# Patient Record
Sex: FEMALE | Race: WHITE | NOT HISPANIC OR LATINO | Employment: UNEMPLOYED | URBAN - METROPOLITAN AREA
[De-identification: names, ages, dates, MRNs, and addresses within clinical notes are randomized per-mention and may not be internally consistent; named-entity substitution may affect disease eponyms.]

---

## 2017-01-17 ENCOUNTER — OFFICE VISIT (OUTPATIENT)
Dept: NEUROLOGY | Facility: CLINIC | Age: 52
End: 2017-01-17
Payer: COMMERCIAL

## 2017-01-17 VITALS
DIASTOLIC BLOOD PRESSURE: 70 MMHG | HEART RATE: 82 BPM | HEIGHT: 66 IN | BODY MASS INDEX: 26.47 KG/M2 | SYSTOLIC BLOOD PRESSURE: 100 MMHG | WEIGHT: 164.69 LBS | RESPIRATION RATE: 16 BRPM

## 2017-01-17 DIAGNOSIS — F32.A ANXIETY AND DEPRESSION: ICD-10-CM

## 2017-01-17 DIAGNOSIS — F41.9 ANXIETY AND DEPRESSION: ICD-10-CM

## 2017-01-17 DIAGNOSIS — M79.7 FIBROMYALGIA: Primary | ICD-10-CM

## 2017-01-17 PROCEDURE — 99999 PR PBB SHADOW E&M-EST. PATIENT-LVL III: CPT | Mod: PBBFAC,,, | Performed by: PSYCHIATRY & NEUROLOGY

## 2017-01-17 PROCEDURE — 1159F MED LIST DOCD IN RCRD: CPT | Mod: S$GLB,,, | Performed by: PSYCHIATRY & NEUROLOGY

## 2017-01-17 PROCEDURE — 99214 OFFICE O/P EST MOD 30 MIN: CPT | Mod: S$GLB,,, | Performed by: PSYCHIATRY & NEUROLOGY

## 2017-01-17 RX ORDER — BUPROPION HYDROCHLORIDE 100 MG/1
100 TABLET ORAL EVERY MORNING
COMMUNITY
End: 2017-08-16

## 2017-01-17 RX ORDER — NORTRIPTYLINE HYDROCHLORIDE 10 MG/1
10 CAPSULE ORAL NIGHTLY
Qty: 30 CAPSULE | Refills: 11 | Status: SHIPPED | OUTPATIENT
Start: 2017-01-17 | End: 2017-05-23 | Stop reason: SDUPTHER

## 2017-01-17 NOTE — MR AVS SNAPSHOT
[facility]  [address] [address]  Phone: [phone]  Fax: [phone]                  Aleah Hagan   2017 11:30 AM   Office Visit    Description:  Female : 1965   Provider:  Anthony Maciel MD   Department:  [facility]           Reason for Visit     Neurologic Problem           Diagnoses this Visit        Comments    Fibromyalgia    -  Primary     Anxiety and depression                To Do List           Goals (5 Years of Data)     None      Follow-Up and Disposition     Return in about 4 months (around 2017).       These Medications        Disp Refills Start End    nortriptyline (PAMELOR) 10 MG capsule 30 capsule 11 2017    Take 1 capsule (10 mg total) by mouth every evening. - Oral    Pharmacy: [facility] [address] [place] [address] #: [phone]         [facility] On Call     [facility] On Call Nurse [facility] Line -  Assistance  Registered nurses in the [facility] provide clinical advisement, health education, appointment booking, and other advisory services.  Call for this free service at [phone].             Medications           Message regarding Medications     Verify the changes and/or additions to your medication regime listed below are the same as discussed with your clinician today.  If any of these changes or additions are incorrect, please notify your healthcare provider.        START taking these NEW medications        Refills    nortriptyline (PAMELOR) 10 MG capsule 11    Sig: Take 1 capsule (10 mg total) by mouth every evening.    Class: Normal    Route: Oral      STOP taking these medications     QUETIAPINE FUMARATE (SEROQUEL ORAL) Take 1 tablet by mouth once daily.           Verify that the below list of medications is an accurate representation of the medications you are currently taking.  If none reported, the list may be blank. If incorrect, please  "contact your healthcare provider. Carry this list with you in case of emergency.           Current Medications     buPROPion (WELLBUTRIN) 100 MG tablet Take 100 mg by mouth every morning.    clonazePAM (KLONOPIN) 0.5 MG tablet Take 0.5 mg by mouth 2 (two) times daily as needed for Anxiety.    DOCOSAHEXANOIC ACID/EPA (FISH OIL ORAL) Take 1 tablet by mouth once daily.    escitalopram oxalate (LEXAPRO) 20 MG tablet Take 20 mg by mouth once daily.    ferrous sulfate 324 mg (65 mg iron) TbEC Take 325 mg by mouth once daily.    multivitamin (ONE DAILY MULTIVITAMIN) per tablet Take 1 tablet by mouth once daily.    omeprazole (PRILOSEC) 40 MG capsule Take 40 mg by mouth once daily.    nortriptyline (PAMELOR) 10 MG capsule Take 1 capsule (10 mg total) by mouth every evening.           Clinical Reference Information           Vital Signs - Last Recorded  Most recent update: 1/17/2017 11:37 AM by Ana Pruitt MA    BP Pulse Resp Ht Wt BMI    100/70 (BP Location: Left arm, Patient Position: Sitting, BP Method: Manual) 82 16 5' 6" (1.676 m) 74.7 kg (164 lb 10.9 oz) 26.58 kg/m2      Blood Pressure          Most Recent Value    BP  100/70      Allergies as of 1/17/2017     Codeine      Immunizations Administered on Date of Encounter - 1/17/2017     None      Smoking Cessation     If you would like to quit smoking:   You may be eligible for free services if you are a [place] resident and started smoking cigarettes before September 1, 1988.  Call the [employer]ess[employer] ([employer]) toll free at [phone] [phone] or [phone].   Call 6-460-QUIT-NOW if you do not meet the above criteria.            "

## 2017-01-17 NOTE — PROGRESS NOTES
"HPI:  Aleah Hagan is a 51 y.o. female with  Fibromyalgia and mild  Lumbar facet arthropathy with multiple areas of pain. Then s/p bariatric surgery with vision tracing problems (resolved)/ symptoms of light headedness with eye movement-resolved.     Although it is not on med list today, she states she is taking Lyrica 50mg BID  She states she continues to have flares of fibromyalgia pain which are severe  She last saw Francy Garvey 3 months ago and has not followed back up for mental health. Her mood is annoyed and "tired"   She has considering doing some TPI again with Dr Reynolds  She has poor sleep    Review of Systems   Constitutional: Negative for fever.   HENT: Negative for nosebleeds.    Eyes: Negative for double vision.   Respiratory: Negative for hemoptysis.    Cardiovascular: Negative for leg swelling.   Gastrointestinal: Negative for blood in stool.   Genitourinary: Negative for hematuria.   Musculoskeletal: Positive for myalgias.   Skin: Negative for rash.   Neurological: Negative for seizures.   Psychiatric/Behavioral: Positive for depression. Negative for suicidal ideas. The patient has insomnia.        Exam:  Gen Appearance, well developed/nourished in no apparent distress  CV: 2+ distal pulses with no edema or swelling  Neuro:  MS: Awake, alert, Sustains attention..  Recent/remote memory intact, Language is full to spontaneous speech/omprehension. Fund of Knowledge is full.   CN: Optic discs are flat with normal vasculature, PERRL, Extraoccular movements and visual fields are full. Normal facial sensation and strength, Hearing symmetric, Tongue and Palate are midline and strong. Shoulder Shrug symmetric and strong.  Motor: Normal bulk, tone, no abnormal movements. 5/5 strength bilateral upper/lower extremities with 2+ reflexes  Sensory: symmetric to  Temp,  and vibration Romberg negative  Cerebellar: Heal to shin, Finger to nose, Rapid alternating movements intact  Gait: Normal stance, no " ataxia  Multiple tender points.           Assessment/Plan: Aleah Hagan is a 51 y.o. female with  Fibromyalgia and mild  Lumbar facet arthropathy with multiple areas of pain. Then s/p bariatric surgery with vision tracing problems (resolved)/ symptoms of light headedness with eye movement-resolved.   I recommend:     1.  Patient's mood was improved with treatments with Francy Dixie but patient did not maintain follow up. The importance of good mood control to further control fibromyalgia symptoms were reviewed. See this provider back and consider additional counseling for mood urged.   2.  Lyrica to continue at 50mg BID (can't tolerate higher dose)  3  Neuropathic cream given for fibro pain not helpful. Elavil worsened constipation.  Gabapentin/Cymbalta also tried without relief.  Had some relief with dry needling prior.  Rheumatology previously diagnosed her with fibromyalgia  4. Add low dose Pamelor per orders for fibromyalgia pain Unless sedation, mood changes or other side effects. Cautioned about low risk of serotonin syndrome with use of Lexapro/Welbutrin. Watch for cramps, GI upset, confusion, fever.  5. She is considering TPI with Dr Reynolds again.  If this fails, another opinion from rheumatology could be considered.   6. CAMMIE symptoms resolved after weight loss surgery and MRI brain normal for eye movement complaint/ reassuring prior - may have been a nutritional change post bariatric surgery?-- resolved    RTC in 4 months

## 2017-03-22 ENCOUNTER — TELEPHONE (OUTPATIENT)
Dept: NEUROLOGY | Facility: CLINIC | Age: 52
End: 2017-03-22

## 2017-03-22 NOTE — TELEPHONE ENCOUNTER
----- Message from Mago Sullivan sent at 3/22/2017 10:07 AM CDT -----  Contact: SELF  Aleah Hagan  MRN: [id number]  : 1965  PCP: Binu Burnett  Home Phone      [phone]  Work Phone      Not on file.  Mobile          [phone]      MESSAGE: The patient has jury duty on 3/28/17 and is requesting if Dr. Maciel would write a letter to excuse her from jury duty. The patient is also requesting a refill on clonazePAM (KLONOPIN) 0.5 MG tablet, buPROPion (WELLBUTRIN) 100 MG tablet, omeprazole (PRILOSEC) 40 MG capsule and pregabalin (LYRICA) 50 MG capsule to be sent to [facility] in [place].  Phone:[phone]

## 2017-03-22 NOTE — TELEPHONE ENCOUNTER
Patient asking for excuse letter for jury duty.  Also no medication refills were put in as none of these medications were prescribed by .

## 2017-03-23 NOTE — TELEPHONE ENCOUNTER
Sorry. The patient does not have a diagnosis via me that would allow her to be excused from Jury duty. However, she can speak to her mental health provider about this if she feels her mood disorder would prevent her from servicing.

## 2017-03-24 RX ORDER — PREGABALIN 50 MG/1
50 CAPSULE ORAL 2 TIMES DAILY
Qty: 60 CAPSULE | Refills: 2 | Status: SHIPPED | OUTPATIENT
Start: 2017-03-24 | End: 2017-05-23 | Stop reason: SDUPTHER

## 2017-05-23 ENCOUNTER — OFFICE VISIT (OUTPATIENT)
Dept: NEUROLOGY | Facility: CLINIC | Age: 52
End: 2017-05-23
Payer: COMMERCIAL

## 2017-05-23 VITALS
HEIGHT: 67 IN | RESPIRATION RATE: 18 BRPM | DIASTOLIC BLOOD PRESSURE: 72 MMHG | SYSTOLIC BLOOD PRESSURE: 120 MMHG | WEIGHT: 161.38 LBS | HEART RATE: 82 BPM | BODY MASS INDEX: 25.33 KG/M2

## 2017-05-23 DIAGNOSIS — F41.9 ANXIETY: ICD-10-CM

## 2017-05-23 DIAGNOSIS — M79.7 FIBROMYALGIA: Primary | ICD-10-CM

## 2017-05-23 DIAGNOSIS — J06.9 VIRAL URI: ICD-10-CM

## 2017-05-23 PROCEDURE — 99999 PR PBB SHADOW E&M-EST. PATIENT-LVL III: CPT | Mod: PBBFAC,,, | Performed by: PSYCHIATRY & NEUROLOGY

## 2017-05-23 PROCEDURE — 1160F RVW MEDS BY RX/DR IN RCRD: CPT | Mod: S$GLB,,, | Performed by: PSYCHIATRY & NEUROLOGY

## 2017-05-23 PROCEDURE — 99214 OFFICE O/P EST MOD 30 MIN: CPT | Mod: S$GLB,,, | Performed by: PSYCHIATRY & NEUROLOGY

## 2017-05-23 RX ORDER — PREGABALIN 50 MG/1
50 CAPSULE ORAL 2 TIMES DAILY
Qty: 60 CAPSULE | Refills: 11 | Status: SHIPPED | OUTPATIENT
Start: 2017-05-23 | End: 2017-05-23 | Stop reason: SDUPTHER

## 2017-05-23 RX ORDER — PREGABALIN 50 MG/1
50 CAPSULE ORAL 2 TIMES DAILY
Qty: 60 CAPSULE | Refills: 5 | Status: SHIPPED | OUTPATIENT
Start: 2017-05-23 | End: 2017-11-13

## 2017-05-23 RX ORDER — NORTRIPTYLINE HYDROCHLORIDE 10 MG/1
10 CAPSULE ORAL NIGHTLY
Qty: 30 CAPSULE | Refills: 11 | Status: SHIPPED | OUTPATIENT
Start: 2017-05-23 | End: 2017-06-20

## 2017-05-23 RX ORDER — CYCLOBENZAPRINE HCL 10 MG
10 TABLET ORAL DAILY PRN
Qty: 30 TABLET | Refills: 11 | Status: SHIPPED | OUTPATIENT
Start: 2017-05-23 | End: 2017-06-02

## 2017-05-23 NOTE — PROGRESS NOTES
HPI:Aleah Hagan is a 51 y.o. female with  Fibromyalgia and mild  Lumbar facet arthropathy with multiple areas of pain. Then s/p bariatric surgery with vision tracing problems (resolved)/ symptoms of light headedness with eye movement-resolved.     Since the last visit, patient tried pamelor but she thinks she ran out and never refilled so she is not sure of the response.   Patient has been using OTC meds for sinus and ear congestion. No fever.   Patient has run out of meds with psychiatry NP as she has not followed up. However, she feels her mood is better.   She has continued myalgia.  Review of Systems   Constitutional: Negative for fever.   HENT: Negative for nosebleeds.    Eyes: Negative for double vision.   Respiratory: Negative for hemoptysis.    Cardiovascular: Negative for leg swelling.   Gastrointestinal: Negative for blood in stool.   Genitourinary: Negative for hematuria.   Musculoskeletal: Positive for myalgias.   Skin: Negative for rash.   Neurological: Negative for tremors.   Psychiatric/Behavioral: Negative for depression and suicidal ideas. The patient is nervous/anxious and has insomnia.         Some anxiety which she states if often mild       Exam:  Gen Appearance, well developed/nourished in no apparent distress  CV: 2+ distal pulses with no edema or swelling  Neuro:  MS: Awake, alert, Sustains attention..  Recent/remote memory intact, Language is full to spontaneous speech/comprehension. Fund of Knowledge is full.   Mood is euthymic  CN: Optic discs are flat with normal vasculature, PERRL, Extraoccular movements and visual fields are full. Normal facial sensation and strength, Hearing symmetric, Tongue and Palate are midline and strong. Shoulder Shrug symmetric and strong.  Motor: Normal bulk, tone, no abnormal movements. 5/5 strength bilateral upper/lower extremities with 2+ reflexes  Sensory: symmetric to  Temp,  and vibration Romberg negative  Cerebellar: Heal to shin, Finger to nose,  Rapid alternating movements intact  Gait: Normal stance, no ataxia  Multiple tender points.   TM clear bilaterally and no lesions in pharynx        Assessment/Plan: Aleah Hagan is a 51 y.o. female with  Fibromyalgia and mild  Lumbar facet arthropathy with multiple areas of pain. Then s/p bariatric surgery with vision tracing problems (resolved)/ symptoms of light headedness with eye movement-resolved.   I recommend:     1.  Patient has seen Francy Garvey for mood- suggested she follow up to maintain on her prior meds, especially if mood worsens again (She states she has tolerated off of Welbutrin and lexapro well with only mild anxiety)  2.  Lyrica to continue at 50mg BID (can't tolerate higher dose)  3  Neuropathic cream given for fibro pain not helpful. Elavil worsened constipation.  Gabapentin/Cymbalta also tried without relief.  Had some relief with dry needling prior.  Rheumatology previously diagnosed her with fibromyalgia. Resume pamelor as suggested prior unless side effects. Patient should try this for 4-6 months to see if this helps.   4. Flexeril per orders for myalgia as well PRN unless sedation  5. She was considering TPI with Dr Reynolds again.  If this fails, another opinion from rheumatology could be considered at any point.   6. CAMMIE symptoms resolved after weight loss surgery and MRI brain normal for eye movement complaint/ reassuring prior - may have been a nutritional change post bariatric surgery?-- resolved  7. See PCP if any fever or worsening with viral type URI symptoms. Otherwise, hydrate, rest and no specific treatment needed.   RTC in 4 months

## 2017-06-20 ENCOUNTER — OFFICE VISIT (OUTPATIENT)
Dept: OBSTETRICS AND GYNECOLOGY | Facility: CLINIC | Age: 52
End: 2017-06-20
Payer: COMMERCIAL

## 2017-06-20 VITALS
HEIGHT: 67 IN | BODY MASS INDEX: 25.4 KG/M2 | SYSTOLIC BLOOD PRESSURE: 120 MMHG | HEART RATE: 68 BPM | RESPIRATION RATE: 12 BRPM | DIASTOLIC BLOOD PRESSURE: 72 MMHG | WEIGHT: 161.81 LBS

## 2017-06-20 DIAGNOSIS — K29.60 REFLUX GASTRITIS: ICD-10-CM

## 2017-06-20 DIAGNOSIS — Z87.410 HISTORY OF CERVICAL DYSPLASIA: ICD-10-CM

## 2017-06-20 DIAGNOSIS — Z01.419 WELL WOMAN EXAM WITH ROUTINE GYNECOLOGICAL EXAM: Primary | ICD-10-CM

## 2017-06-20 DIAGNOSIS — Z12.31 SCREENING MAMMOGRAM, ENCOUNTER FOR: ICD-10-CM

## 2017-06-20 DIAGNOSIS — Z12.4 CERVICAL CANCER SCREENING: ICD-10-CM

## 2017-06-20 PROCEDURE — 99396 PREV VISIT EST AGE 40-64: CPT | Mod: S$GLB,,, | Performed by: OBSTETRICS & GYNECOLOGY

## 2017-06-20 PROCEDURE — 87624 HPV HI-RISK TYP POOLED RSLT: CPT

## 2017-06-20 PROCEDURE — 88141 CYTOPATH C/V INTERPRET: CPT | Mod: ,,, | Performed by: PATHOLOGY

## 2017-06-20 PROCEDURE — 88175 CYTOPATH C/V AUTO FLUID REDO: CPT | Performed by: PATHOLOGY

## 2017-06-20 PROCEDURE — 99999 PR PBB SHADOW E&M-EST. PATIENT-LVL III: CPT | Mod: PBBFAC,,, | Performed by: OBSTETRICS & GYNECOLOGY

## 2017-06-20 RX ORDER — OMEPRAZOLE 40 MG/1
40 CAPSULE, DELAYED RELEASE ORAL DAILY
Qty: 30 CAPSULE | Refills: 0 | Status: SHIPPED | OUTPATIENT
Start: 2017-06-20 | End: 2017-11-13

## 2017-06-20 NOTE — PROGRESS NOTES
Subjective:    Patient ID: Aleah Hagan is a 51 y.o. y.o. female.     Chief Complaint: Annual Well Woman Exam     History of Present Illness:  Aleah WRAY presents today for Annual Well Woman exam. She describes her menses as postmenopausal with no PMB.She denies pelvic pain.  She denies breast tenderness, masses, nipple discharge. She denies difficulty with urination or bowel movements. She denies menopausal symptoms such as hotflashes, vaginal dryness, and night sweats. She denies bloating, early satiety, or weight changes. She is sexually active.      Menstrual History:   Patient's last menstrual period was 2015..     OB History      Para Term  AB Living    2 2 1 1  2    SAB TAB Ectopic Multiple Live Births                  The following portions of the patient's history were reviewed and updated as appropriate: allergies, current medications, past family history, past medical history, past social history, past surgical history and problem list.    ROS:   CONSTITUTIONAL: chills, fatigue, Denies: fever, diaphoresis, fatigue, weight loss, weight gain  ENT: negative for sore throat, nasal congestion, nasal discharge, epistaxis, tinnitus, hearing loss  EYES: decreased vision, Denies: blurry vision, loss of vision, eye pain, diplopia, discharge  SKIN: itching, Denies: rash, hives  RESPIRATORY: negative for cough, hemoptysis, shortness of breath, pleuritic chest pain, wheezing  CARDIOVASCULAR: negative for chest pain, dyspnea on exertion, orthopnea, paroxysmal nocturnal dyspnea, edema, palpitations  BREAST: negative for breast  tenderness, breast mass, nipple discharge, or skin changes  GASTROINTESTINAL: diarrhea, constipation, Denies: abdominal pain, flank pain, nausea, vomiting, black stool, blood in stool  GENITOURINARY: genital discharge, Denies: dysuria, frequency/urgency, hematuria, vaginal bleeding, irregular menses, heavy menses, pelvic pain  HEMATOLOGIC/LYMPHATIC: swollen lymph nodes,  Denies: bleeding, bruising  MUSCULOSKELETAL: back pain, joint pain, joint stiffness, joint swelling, muscle pain, muscle weakness  NEUROLOGICAL: focal weakness, Denies: dizzy/vertigo, headache, numbness/tingling, speech problems, loss of consciousness, confusion  BEHAVORIAL/PSYCH: No psychosis and Positive for history of depression and anxiety  ENDOCRINE: temperature intolerance, Denies: polydipsia/polyuria, palpitations, skin changes, unexpected weight changes  ALLERGIC/IMMUNOLOGIC: negative for urticaria, hay fever, angioedema      Objective:    Vital Signs:  Vitals:    06/20/17 1330   BP: 120/72   Pulse: 68   Resp: 12       Physical Exam:  General:  alert, cooperative, appears stated age, no distress   Skin:  Skin color, texture, turgor normal. No rashes or lesions   HEENT:  extra ocular movement intact, sclera clear, anicteric   Neck: supple, trachea midline, no adenopathy or thyromegally   Respiratory:  Normal effort   Breasts:  no discharge, erythema, or tenderness, bilateral implants were noted without obvious palpable abnormalities   Abdomen:  soft, nontender, no palpable masses   Pelvis: External genitalia: normal general appearance  Urinary system: urethral meatus normal, bladder nontender  Vaginal: normal mucosa without prolapse or lesions  Cervix: normal appearance  Uterus: normal size, shape, position  Adnexa: normal size, nontender bilaterally   Extremities: Normal ROM; no edema, no cyanosis   Neurologial: Normal strength and tone. No focal numbness or weakness   Psychiatric: normal mood, speech, dress, and thought processes         Assessment:       Healthy female exam.     1. Well woman exam with routine gynecological exam    2. Cervical cancer screening    3. History of cervical dysplasia    4. Reflux gastritis    5. Screening mammogram, encounter for          Plan:      1. Well woman exam with routine gynecological exam    2. Cervical cancer screening  - Liquid-based pap smear, diagnostic  - HPV  High Risk Genotypes, PCR    3. History of cervical dysplasia  - Liquid-based pap smear, diagnostic  - HPV High Risk Genotypes, PCR    4. Reflux gastritis  - omeprazole (PRILOSEC) 40 MG capsule; Take 1 capsule (40 mg total) by mouth once daily.  Dispense: 30 capsule; Refill: 0    5. Screening mammogram, encounter for  - Mammo Digital Screening Bilat with CAD; Future      COUNSELING:  Aleah WRAY was counseled on STD pevention, use and side-effects of various contraceptive measures, A.C.O.G. Pap guidelines and recommendations for yearly pelvic exams in addition to recommendations for monthly self breast exams; to see her PCP for other health maintenance.

## 2017-06-26 LAB
HPV HR 12 DNA CVX QL NAA+PROBE: POSITIVE
HPV16 DNA SPEC QL NAA+PROBE: NEGATIVE
HPV18 DNA SPEC QL NAA+PROBE: NEGATIVE

## 2017-06-28 ENCOUNTER — TELEPHONE (OUTPATIENT)
Dept: OBSTETRICS AND GYNECOLOGY | Facility: CLINIC | Age: 52
End: 2017-06-28

## 2017-06-28 NOTE — TELEPHONE ENCOUNTER
----- Message from Karin Gamboa MA sent at 6/26/2017  3:21 PM CDT -----  Pt is returning call for results. [phone]

## 2017-07-07 ENCOUNTER — PATIENT MESSAGE (OUTPATIENT)
Dept: OBSTETRICS AND GYNECOLOGY | Facility: CLINIC | Age: 52
End: 2017-07-07

## 2017-08-16 ENCOUNTER — PROCEDURE VISIT (OUTPATIENT)
Dept: OBSTETRICS AND GYNECOLOGY | Facility: CLINIC | Age: 52
End: 2017-08-16
Payer: COMMERCIAL

## 2017-08-16 ENCOUNTER — HOSPITAL ENCOUNTER (OUTPATIENT)
Dept: RADIOLOGY | Facility: HOSPITAL | Age: 52
Discharge: HOME | End: 2017-08-16
Attending: OBSTETRICS & GYNECOLOGY
Payer: COMMERCIAL

## 2017-08-16 VITALS
WEIGHT: 162.19 LBS | HEIGHT: 67 IN | SYSTOLIC BLOOD PRESSURE: 112 MMHG | HEART RATE: 64 BPM | BODY MASS INDEX: 25.46 KG/M2 | DIASTOLIC BLOOD PRESSURE: 78 MMHG | RESPIRATION RATE: 13 BRPM

## 2017-08-16 DIAGNOSIS — R87.612 LGSIL ON PAP SMEAR OF CERVIX: Primary | ICD-10-CM

## 2017-08-16 DIAGNOSIS — Z12.31 SCREENING MAMMOGRAM, ENCOUNTER FOR: ICD-10-CM

## 2017-08-16 DIAGNOSIS — Z01.812 PRE-PROCEDURE LAB EXAM: ICD-10-CM

## 2017-08-16 LAB
B-HCG UR QL: NEGATIVE
CTP QC/QA: YES

## 2017-08-16 PROCEDURE — 77067 SCR MAMMO BI INCL CAD: CPT | Mod: 26,,, | Performed by: RADIOLOGY

## 2017-08-16 PROCEDURE — 88305 TISSUE EXAM BY PATHOLOGIST: CPT | Performed by: PATHOLOGY

## 2017-08-16 PROCEDURE — 57455 BIOPSY OF CERVIX W/SCOPE: CPT | Mod: S$GLB,,, | Performed by: OBSTETRICS & GYNECOLOGY

## 2017-08-16 PROCEDURE — 77067 SCR MAMMO BI INCL CAD: CPT | Mod: TC

## 2017-08-16 PROCEDURE — 81025 URINE PREGNANCY TEST: CPT | Mod: QW,S$GLB,, | Performed by: OBSTETRICS & GYNECOLOGY

## 2017-08-16 NOTE — PROCEDURES
Procedures   Colposcopy Procedure Note    Aleah Hagan is a 51 y.o. female  presents today for colposcopy secondary to abnormal pap smear results.  Patient voices understanding of procedure.     Indications: Pap smear 2 months ago showed: low-grade squamous intraepithelial neoplasia (LGSIL - encompassing HPV,mild dysplasia,ANH I). Prior cervical treatment: LEEP 16 with ANH 1-2.    Procedure Details   The abnormal test findings were discussed, as well as HPV infection, need for colposcopy and possible biopsies to determine the plan of care, treatments available, the minimal risk of bleeding and infection with colposcopy, and alternatives to colposcopy and she agrees to proceed.      UPT is negative    COLPOSCOPY EXAM PROCEDURE NOTE:   TIME OUT PERFORMED.     Speculum placed in vagina and excellent visualization of cervix achieved, cervix swabbed x 3 with acetic acid solution. Biopsy was taken at 1 o'clock.  ECC was not performed. Hemostasis was adequate with pressure.  The speculum was removed.The patient did tolerate the procedure well. All collected specimens sent to pathology for histologic analysis.    Findings:  Cervix: acetowhite lesion(s) noted at 1 o'clock  Vaginal inspection: normal without visible lesions.  Vulvar colposcopy: vulvar colposcopy not performed.    Specimens: 1 oclock cervical biopsy    Complications: none.     Assessment:  Aleah WRAY was seen today for colposcopy.    Diagnoses and all orders for this visit:    LGSIL on Pap smear of cervix  -     Tissue Specimen To Pathology, Obstetrics/Gynecology    Pre-procedure lab exam  -     POCT Urine Pregnancy          Plan:  Specimens labelled and sent to Pathology.  Will base further treatment on Pathology findings.      Post-colposcopy counseling:  The patient was instructed to manage post-colposcopy cramping with NSAIDs or Tylenol, or with a prescription per the medication card.  Avoid intercourse, douching, or tampons in the vagina  for at least 2-3 days.  Expect a clumpy blackish discharge due to Monsel's solution application for several days.  Report heavy bleeding, worsening pain or pain that does not respond to above medications, or foul-smelling vaginal discharge. HPV vaccine recommended according to FDA age guidelines.  Importance of follow-up stressed.      Follow up based on colposcopy results.

## 2017-08-24 DIAGNOSIS — N87.1 DYSPLASIA OF CERVIX, HIGH GRADE CIN 2: Primary | ICD-10-CM

## 2017-08-30 ENCOUNTER — OFFICE VISIT (OUTPATIENT)
Dept: OBSTETRICS AND GYNECOLOGY | Facility: CLINIC | Age: 52
End: 2017-08-30
Payer: COMMERCIAL

## 2017-08-30 ENCOUNTER — HOSPITAL ENCOUNTER (OUTPATIENT)
Dept: RADIOLOGY | Facility: HOSPITAL | Age: 52
Discharge: HOME | End: 2017-08-30
Attending: OBSTETRICS & GYNECOLOGY
Payer: COMMERCIAL

## 2017-08-30 ENCOUNTER — TELEPHONE (OUTPATIENT)
Dept: OBSTETRICS AND GYNECOLOGY | Facility: CLINIC | Age: 52
End: 2017-08-30

## 2017-08-30 VITALS
DIASTOLIC BLOOD PRESSURE: 74 MMHG | WEIGHT: 167.63 LBS | BODY MASS INDEX: 26.31 KG/M2 | RESPIRATION RATE: 12 BRPM | HEART RATE: 67 BPM | SYSTOLIC BLOOD PRESSURE: 110 MMHG | HEIGHT: 67 IN

## 2017-08-30 DIAGNOSIS — T85.848A PAIN FROM BREAST IMPLANT, INITIAL ENCOUNTER: Primary | ICD-10-CM

## 2017-08-30 DIAGNOSIS — T85.848A PAIN FROM BREAST IMPLANT, INITIAL ENCOUNTER: ICD-10-CM

## 2017-08-30 DIAGNOSIS — N64.89 ASYMMETRICAL BREASTS: ICD-10-CM

## 2017-08-30 PROCEDURE — 99999 PR PBB SHADOW E&M-EST. PATIENT-LVL III: CPT | Mod: PBBFAC,,, | Performed by: OBSTETRICS & GYNECOLOGY

## 2017-08-30 PROCEDURE — 76641 ULTRASOUND BREAST COMPLETE: CPT | Mod: TC,RT

## 2017-08-30 PROCEDURE — 99213 OFFICE O/P EST LOW 20 MIN: CPT | Mod: S$GLB,,, | Performed by: OBSTETRICS & GYNECOLOGY

## 2017-08-30 PROCEDURE — 76641 ULTRASOUND BREAST COMPLETE: CPT | Mod: 26,RT,, | Performed by: RADIOLOGY

## 2017-08-30 PROCEDURE — 3008F BODY MASS INDEX DOCD: CPT | Mod: S$GLB,,, | Performed by: OBSTETRICS & GYNECOLOGY

## 2017-08-30 RX ORDER — ZOLPIDEM TARTRATE 5 MG/1
5 TABLET ORAL NIGHTLY PRN
Qty: 15 TABLET | Refills: 0 | Status: SHIPPED | OUTPATIENT
Start: 2017-08-30 | End: 2017-10-31 | Stop reason: SDUPTHER

## 2017-08-30 NOTE — TELEPHONE ENCOUNTER
I would be willing to prescribe a short course of ambien if she is interested.    Please let her know that I do not prescribe benzos (xanax, klonopin, etc) for this.

## 2017-08-30 NOTE — TELEPHONE ENCOUNTER
Patient states she was seen in the office this morning. Patient requesting to know if you can prescribe her medication for insomnia and anxiety. Please advise.

## 2017-08-30 NOTE — TELEPHONE ENCOUNTER
Patient requesting Ambien prescription to hold her over until she can see her PCP Dr. Burnett. Please advise.

## 2017-08-30 NOTE — PROGRESS NOTES
Subjective:    Patient ID: Aleah Hagan is a 51 y.o. y.o. female.     Chief Complaint:   Chief Complaint   Patient presents with    Breast Mass     x1 year, pt states it is starting to feel different, pt denies any pain, nipple dc, or skin changes       History of Present Illness:   Aleah WRAY presents for evaluation of a breast lump on exam and breast pain noted by [facility] several months ago and is gradually worsening. She reports that her right implant has changed significantly over that time.  She reports that both breasts used to feel symmetrical, but now right breast is a large firm mass.  Patient's last menstrual period was 02/01/2015..       ROS:   CONSTITUTIONAL: Negative for fever, chills, diaphoresis, weakness, fatigue, weight loss, weight gain  GASTROINTESTINAL: negative for abdominal pain, flank pain, nausea, vomiting, diarrhea, constipation, black stool, blood in stool  BREAST: per HPI  GENITOURINARY: negative for dysuria, frequency/urgency, hematuria, genital discharge, vaginal bleeding, irregular menses, heavy menses, pelvic pain  HEMATOLOGIC/LYMPHATIC: negative for swollen lymph nodes, bleeding, bruising  MUSCULOSKELETAL: negative for back pain, joint pain, joint stiffness, joint swelling, muscle pain, muscle weakness  ENDOCRINE: negative for polydipsia/polyuria, palpitations, skin changes, temperature intolerance, unexpected weight changes      Physical Exam:   Vital Signs:  Vitals:    08/30/17 0743   BP: 110/74   Pulse: 67   Resp: 12       General:  alert, cooperative, no distress   Breasts:  right and left breasts are asymmetrical; right breast firm throughout with limited examination secondary to ambormal feeling implant; left breast implant an breast tissue without appreciated mass       Assessment:      1. Pain from breast implant, initial encounter    2. Asymmetrical breasts          Plan:      Pain from breast implant, initial encounter  -     US Breast Right Complete; Future; Expected date:  08/30/2017  -     Mammo Digital Diagnostic Right with CAD; Future; Expected date: 08/30/2017    Asymmetrical breasts  -     US Breast Right Complete; Future; Expected date: 08/30/2017  -     Mammo Digital Diagnostic Right with CAD; Future; Expected date: 08/30/2017

## 2017-08-30 NOTE — TELEPHONE ENCOUNTER
----- Message from Karen Wong sent at 8/30/2017  8:16 AM CDT -----  Contact: self  Aleah Hagan  MRN: [id number]  Home Phone      [phone]  Work Phone      Not on file.  Mobile          [phone]    Patient Care Team:  Binu Burnett MD as PCP - General (Family Medicine)  Cisco Deleon MD as Obstetrician (Obstetrics and Gynecology)  OB? No  What phone number can you be reached at? [phone]  Message: pt just left her Dr appt with Pancho and is calling back because she would like to know if she can have something called in to help her sleep and for anxiety

## 2017-09-06 ENCOUNTER — HOSPITAL ENCOUNTER (OUTPATIENT)
Dept: PREADMISSION TESTING | Facility: HOSPITAL | Age: 52
Discharge: HOME | End: 2017-09-06
Attending: OBSTETRICS & GYNECOLOGY
Payer: COMMERCIAL

## 2017-09-06 ENCOUNTER — ANESTHESIA EVENT (OUTPATIENT)
Dept: SURGERY | Facility: HOSPITAL | Age: 52
End: 2017-09-06
Payer: COMMERCIAL

## 2017-09-06 ENCOUNTER — OFFICE VISIT (OUTPATIENT)
Dept: OBSTETRICS AND GYNECOLOGY | Facility: CLINIC | Age: 52
End: 2017-09-06
Payer: COMMERCIAL

## 2017-09-06 VITALS
HEIGHT: 67 IN | DIASTOLIC BLOOD PRESSURE: 76 MMHG | BODY MASS INDEX: 26.08 KG/M2 | RESPIRATION RATE: 13 BRPM | WEIGHT: 166.19 LBS | SYSTOLIC BLOOD PRESSURE: 122 MMHG | HEART RATE: 65 BPM

## 2017-09-06 VITALS — WEIGHT: 166 LBS | HEIGHT: 67 IN | BODY MASS INDEX: 26.06 KG/M2

## 2017-09-06 DIAGNOSIS — Z01.818 PREOP TESTING: ICD-10-CM

## 2017-09-06 DIAGNOSIS — N87.1 CIN II (CERVICAL INTRAEPITHELIAL NEOPLASIA II): ICD-10-CM

## 2017-09-06 DIAGNOSIS — N87.1 DYSPLASIA OF CERVIX, HIGH GRADE CIN 2: Primary | ICD-10-CM

## 2017-09-06 PROCEDURE — 99999 PR PBB SHADOW E&M-EST. PATIENT-LVL III: CPT | Mod: PBBFAC,,, | Performed by: OBSTETRICS & GYNECOLOGY

## 2017-09-06 PROCEDURE — 99499 UNLISTED E&M SERVICE: CPT | Mod: S$GLB,,, | Performed by: OBSTETRICS & GYNECOLOGY

## 2017-09-06 RX ORDER — MAGNESIUM HYDROXIDE 400 MG/5ML
1 SUSPENSION, ORAL (FINAL DOSE FORM) ORAL DAILY
COMMUNITY

## 2017-09-06 RX ORDER — SULFAMETHOXAZOLE AND TRIMETHOPRIM 800; 160 MG/1; MG/1
1 TABLET ORAL 2 TIMES DAILY
Qty: 6 TABLET | Refills: 0 | Status: SHIPPED | OUTPATIENT
Start: 2017-09-06 | End: 2017-09-09

## 2017-09-06 RX ORDER — CEPHALEXIN 500 MG/1
500 CAPSULE ORAL EVERY 6 HOURS
COMMUNITY
End: 2017-09-06

## 2017-09-06 NOTE — DISCHARGE INSTRUCTIONS
[facility]  Pre Admit Instructions    Day and Date of Procedure: Monday 9-      · Call your doctor if you become ill before your surgery  · Someone will call you between 1 p.m. And 5 p.m.the workday before the procedure to give you an arrival time       - Before 7 a.m. Enter through Emergency Room       - 7 a.m. To 5 p.m. Enter through Patient Registration Main Lobby  · You must have a responsible  to bring you home    Do NOT eat or drink anything   past midnight before your procedure day    Please    · Do not wear makeup, jewelry, nail polish or body piercings  · Bring containers/solution for contacts, dentures, bridges - these and hearing aids will be removed before your procedure  · Do not bring cash, jewelry or valuables the day of your procedure   · No smoking at least 24 hours before your procedure  · Wear clothing that is comfortable and easy to take off and put on  · Do NOT shave for at least 5 days before your surgery    Review skin preparation handout before using. Shower with Hibiclens the Night before the procedure. Bring remaining Hibiclens with you the morning of surgery.                Information about your stay (Please Review)    Before Surgery  1. Cafeteria Meals: 7am to 10am; 11am to 1:30 pm; Dinner/Supper must may be ordered between 11:00 am and 4 pm from the [facility] Cafe After [employer] Menu. Food will be available to  between 5 pm and 6 pm. The kitchen phone extension is [phone].  2. Your doctor may order and review labs, x-rays, ECG or other tests as a pre-surgery workup and will call you if there is need for follow up.  3. No smoking inside or outside the hospital on hospital grounds.  4. Wear clothing that is easy to take off and put on.  The hospital will provide you with a gown.  5. You may bring robe, slippers, nightwear, and toiletries (toothbrush, toothpaste, makeup).  6. If your doctor orders a Fleets Enema or other prep, follow package and/or doctors  orders.  7. Brush your teeth and rinse your mouth the morning of surgery, but dont swallow the water.  8. The nurse will ask questions and check your condition.  The doctor may kera your surgical site.  9. Compression boots may be put on your calves to reduce the risk of blood clots.  10. The doctor may order medicine to help you relax before surgery.  After Surgery  1. The nurse will check your temperature, breathing, blood pressure, heart rate, IV site, and surgery site.  2. A diet will be ordered-most start with ice chips and then advance slowly to other foods.  3. If you have IV fluids the IV pump will beep to let the nurse know that she needs to check it.  4. You may have a urinary catheter and staff may measure your oral intake and urine output.  5. Pain medication may be ordered by the doctor after surgery.  If you have a pain management device tell your caretakers not to press the button because of OVERDOSE RISK.  6. When the nurse or doctor tells you it is okay to get out of bed, ask for help until you are stable.  7. The nurse may ask you to turn, cough, and deep breathe to prevent lung problems.  You can use a pillow to hold your incision when you deep breathe or cough to reduce pain.  8. The nurse will give you discharge instructions--incision care, symptoms to report to your doctor, and your follow-up appointment when you are discharged.  You cannot drive yourself home.  Goal for Discharge from One Day Surgery  · Control pain with an oral medication  · Walk without feeling dizzy or weak  · Tolerate liquids well  · Urinate without difficulty    Things you can do to  Reduce the Risk of Infections or Complications  Wash Hands and use Waterless Hand Sanitizers  · Wash hands frequently with soap and warm water for at least 15 seconds.   · Use hand sanitizers (alcohol based) often at home and in public if hands are not visibly soiled  Take Antibiotic Exactly as Prescribed  · Do not stop antibiotics too soon;  you risk developing infection resistant to antibiotics  · Take your antibiotic even if you are feeling better and even if they upset your stomach  · Call the doctor if you cant tolerate the antibiotic or you have an allergic reaction  Stay Healthy  · Take medicines as prescribed by your doctor  · Keep your diabetes under control - diet and medication  · Get enough rest, exercise and eat a healthy diet  Keep the Wound Clean and Dry  · Wash hands before and after taking care of the incision (cut)  · Wash hands when you remove a dressing, before you touch/apply a new dressing  · Shower and clean incision with antibacterial soap and rinse well if the doctor approves  · Allow the cut to dry completely before putting on a clean dressing  · Do not touch the part of the bandage that will cover the incision  · Do not use ointments unless your doctor tells you to-can promote bacterial growth  · If ordered, put ointment directly on the dressing-do not touch the end of the tube  · Do not scrub, remove scabs, or leave a damp dressing on the incision  · Do not use peroxide or alcohol to clean the incision unless the doctor tells you to   · Do not let children, pets or anyone else contaminate the incision  Stop Smoking To Prevent Infection  · Stop smoking-Centers for Disease Control recommends 30 days before surgery  · Smokers get more infections after surgery-studies have shown 6 times the risk  · Smokers have more scarring and heal slower-open wounds get infected easier  Prevent Respiratory complications  · Stop smoking  · Turn, cough, and deep breathe even if you have some pain when you do so.  · Splint your incision with a pillow when you cough/deep breath, to help control pain.  · Do not lie in one position for long periods of time.   Prevent Blood Clots  · When you wake move your legs, flex your feet, rotate your ankles, wiggle your toes  · Get up when the doctor says its ok.  Dangle your feet from the side of the  bed  · Report symptoms-leg pain, redness/swelling, warm to touch; fever; shortness of breath, chest pain, severe upper back pain.

## 2017-09-07 NOTE — H&P
Pre-Operative History and Physical   Obstetrics and Gynecology    Aleah Hagan is a 52 y.o. female  for preop examination.  She is scheduled for a TLH/BS on 17. Patient has been followed for 2 years with abnormal pap smears and abnormal cervical pathology.    Pap history as follows:  2015:    pap with LGSIL and cells worrisome for high grade lesion, HPV positive   Colposcopy with biopsy of CIN2  2016:   LEEP with ANH 1-2  2017:   Pap with LGSIL and HPV positive   Colposcopy with biopsy of CIN2    Secondary to persistence of CIN2 despite LEEP procedure, patient desires definitive surgical procedure of hysterectomy with cervical excision.     ROS:  GENERAL: Denies weight gain or weight loss. Feeling well overall.   SKIN: Denies rash or lesions.   HEAD: Denies head injury or headache.   NODES: Denies enlarged lymph nodes.   CHEST: Denies chest pain or shortness of breath.   CARDIOVASCULAR: Denies palpitations or left sided chest pain.   ABDOMEN: No abdominal pain, constipation, diarrhea, nausea, vomiting or rectal bleeding.   URINARY: No frequency, dysuria, hematuria, or burning on urination.  REPRODUCTIVE: See HPI.   BREASTS: The patient performs breast self-examination and denies pain, lumps, or nipple discharge.   HEMATOLOGIC: No easy bruisability or excessive bleeding with the exception of menstrual cycles.  MUSCULOSKELETAL: Denies joint pain or swelling.   NEUROLOGIC: Denies syncope or weakness.   PSYCHIATRIC: Denies depression, anxiety or mood swings.    Past Medical History:   Diagnosis Date    Abnormal Pap smear of cervix 2015    leep done- CIN1    Arthritis     ANH II (cervical intraepithelial neoplasia II)     Disc degeneration     ruptured    Fibromyalgia     Synovial cyst of lumbar spine      Past Surgical History:   Procedure Laterality Date    APPENDECTOMY      age 22    BREAST SURGERY  2014    augmentation    CERVICAL BIOPSY  W/ LOOP ELECTRODE EXCISION  16     ANH 1     SECTION, CLASSIC      x2    CHOLECYSTECTOMY  10/2013    gastric sleeve  14    NECK SURGERY  2015    cosmetic surgery    TONSILLECTOMY, ADENOIDECTOMY  age 9     Family History   Problem Relation Age of Onset    Cancer Mother      lung    Cancer Father      lung    Cancer Sister      thyroid    Ovarian cancer Neg Hx     Breast cancer Neg Hx     Colon cancer Neg Hx      Review of patient's allergies indicates:   Allergen Reactions    Codeine Hives    Shrimp Hives       Current Outpatient Prescriptions:     multivitamin (ONE DAILY MULTIVITAMIN) per tablet, Take 1 tablet by mouth once daily., Disp: , Rfl:     omeprazole (PRILOSEC) 40 MG capsule, Take 1 capsule (40 mg total) by mouth once daily., Disp: 30 capsule, Rfl: 0    PHENAZOPYRIDINE HCL (AZO ORAL), Take 2 tablets by mouth 3 (three) times daily. , Disp: , Rfl:     pregabalin (LYRICA) 50 MG capsule, Take 1 capsule (50 mg total) by mouth 2 (two) times daily., Disp: 60 capsule, Rfl: 5    zolpidem (AMBIEN) 5 MG Tab, Take 1 tablet (5 mg total) by mouth nightly as needed., Disp: 15 tablet, Rfl: 0    cyanocobalamin, vitamin B-12, 5,000 mcg TbDL, Take 1 tablet by mouth once daily., Disp: , Rfl:     sulfamethoxazole-trimethoprim 800-160mg (BACTRIM DS) 800-160 mg Tab, Take 1 tablet by mouth 2 (two) times daily., Disp: 6 tablet, Rfl: 0  Outpatient Prescriptions Marked as Taking for the 17 encounter (Office Visit) with Cisco Deleon MD   Medication Sig Dispense Refill    multivitamin (ONE DAILY MULTIVITAMIN) per tablet Take 1 tablet by mouth once daily.      omeprazole (PRILOSEC) 40 MG capsule Take 1 capsule (40 mg total) by mouth once daily. 30 capsule 0    PHENAZOPYRIDINE HCL (AZO ORAL) Take 2 tablets by mouth 3 (three) times daily.       pregabalin (LYRICA) 50 MG capsule Take 1 capsule (50 mg total) by mouth 2 (two) times daily. 60 capsule 5    zolpidem (AMBIEN) 5 MG Tab Take 1 tablet (5 mg total) by mouth nightly as  needed. 15 tablet 0    [DISCONTINUED] cephALEXin (KEFLEX) 500 MG capsule Take 500 mg by mouth every 6 (six) hours.       Social History   Substance Use Topics    Smoking status: Current Every Day Smoker     Packs/day: 0.30     Years: 10.00     Types: Cigarettes     Start date: 9/6/1984    Smokeless tobacco: Never Used      Comment: stopped for 23 years and then started 2 years ago again     Alcohol use Yes      Comment: Socially-2 times a month on the weekend-3 drinks each day          Vitals:    09/06/17 1231   BP: 122/76   Pulse: 65   Resp: 13     General Appearance: Alert, appropriate appearance for age. No acute distress, Chest/Respiratory Exam: Normal.   Cardiovascular Exam: regular rate and rhythm   Gastrointestinal Exam: soft, nontender  Pelvic Exam Female: Exam deferred.   Psychiatric Exam: Alert and oriented, appropriate affect.    Assessment: Persistent cervical dysplasia (CIN2) despite LEEP procedure    Plan: To OR for TLH/BS    I have discussed the risks, benefits, indications, and alternatives of the procedure in detail.  The patient verbalizes her understanding.  All questions answered.  Consents signed.  The patient agrees to proceed to proceed as planned.

## 2017-09-11 ENCOUNTER — SURGERY (OUTPATIENT)
Age: 52
End: 2017-09-11

## 2017-09-11 ENCOUNTER — ANESTHESIA (OUTPATIENT)
Dept: SURGERY | Facility: HOSPITAL | Age: 52
End: 2017-09-11
Payer: COMMERCIAL

## 2017-09-11 ENCOUNTER — HOSPITAL ENCOUNTER (OUTPATIENT)
Facility: HOSPITAL | Age: 52
Discharge: HOME | End: 2017-09-12
Attending: OBSTETRICS & GYNECOLOGY | Admitting: OBSTETRICS & GYNECOLOGY
Payer: COMMERCIAL

## 2017-09-11 DIAGNOSIS — N87.1 CIN II (CERVICAL INTRAEPITHELIAL NEOPLASIA II): ICD-10-CM

## 2017-09-11 DIAGNOSIS — N87.1 DYSPLASIA OF CERVIX, HIGH GRADE CIN 2: ICD-10-CM

## 2017-09-11 LAB
HCG SERPL QL: NEGATIVE
POCT GLUCOSE: 63 MG/DL (ref 70–110)

## 2017-09-11 PROCEDURE — 27000496 *HC LARYNGEAL BLADE (STAH ONLY): Performed by: NURSE ANESTHETIST, CERTIFIED REGISTERED

## 2017-09-11 PROCEDURE — 63600175 PHARM REV CODE 636 W HCPCS: Performed by: OBSTETRICS & GYNECOLOGY

## 2017-09-11 PROCEDURE — 36000711: Performed by: OBSTETRICS & GYNECOLOGY

## 2017-09-11 PROCEDURE — 58571 TLH W/T/O 250 G OR LESS: CPT | Mod: 80,,, | Performed by: OBSTETRICS & GYNECOLOGY

## 2017-09-11 PROCEDURE — 25000003 PHARM REV CODE 250: Performed by: OBSTETRICS & GYNECOLOGY

## 2017-09-11 PROCEDURE — 37000008 HC ANESTHESIA 1ST 15 MINUTES: Performed by: OBSTETRICS & GYNECOLOGY

## 2017-09-11 PROCEDURE — 27201423 OPTIME MED/SURG SUP & DEVICES STERILE SUPPLY: Performed by: OBSTETRICS & GYNECOLOGY

## 2017-09-11 PROCEDURE — 94760 N-INVAS EAR/PLS OXIMETRY 1: CPT

## 2017-09-11 PROCEDURE — 58571 TLH W/T/O 250 G OR LESS: CPT | Mod: ,,, | Performed by: OBSTETRICS & GYNECOLOGY

## 2017-09-11 PROCEDURE — 36000710: Performed by: OBSTETRICS & GYNECOLOGY

## 2017-09-11 PROCEDURE — 00840 ANES IPER PX LOWER ABD NOS: CPT | Mod: P2,QZ | Performed by: NURSE ANESTHETIST, CERTIFIED REGISTERED

## 2017-09-11 PROCEDURE — 25000003 PHARM REV CODE 250: Performed by: NURSE ANESTHETIST, CERTIFIED REGISTERED

## 2017-09-11 PROCEDURE — 27200120 HC KIT IV START (RUSH ONLY): Performed by: NURSE ANESTHETIST, CERTIFIED REGISTERED

## 2017-09-11 PROCEDURE — 84703 CHORIONIC GONADOTROPIN ASSAY: CPT

## 2017-09-11 PROCEDURE — 36415 COLL VENOUS BLD VENIPUNCTURE: CPT

## 2017-09-11 PROCEDURE — 94761 N-INVAS EAR/PLS OXIMETRY MLT: CPT

## 2017-09-11 PROCEDURE — 94799 UNLISTED PULMONARY SVC/PX: CPT

## 2017-09-11 PROCEDURE — 37000009 HC ANESTHESIA EA ADD 15 MINS: Performed by: OBSTETRICS & GYNECOLOGY

## 2017-09-11 PROCEDURE — 71000033 HC RECOVERY, INTIAL HOUR: Performed by: OBSTETRICS & GYNECOLOGY

## 2017-09-11 PROCEDURE — 63600175 PHARM REV CODE 636 W HCPCS: Performed by: NURSE ANESTHETIST, CERTIFIED REGISTERED

## 2017-09-11 PROCEDURE — 27000495 *HC ANESTHESIA START UP SUPPLY KIT (STAH ONLY): Performed by: NURSE ANESTHETIST, CERTIFIED REGISTERED

## 2017-09-11 PROCEDURE — 88309 TISSUE EXAM BY PATHOLOGIST: CPT | Performed by: PATHOLOGY

## 2017-09-11 PROCEDURE — 27000494 *HC OXYGEN SET UP (STAH ONLY): Performed by: NURSE ANESTHETIST, CERTIFIED REGISTERED

## 2017-09-11 RX ORDER — ACETAMINOPHEN 10 MG/ML
INJECTION, SOLUTION INTRAVENOUS
Status: DISCONTINUED | OUTPATIENT
Start: 2017-09-11 | End: 2017-09-11

## 2017-09-11 RX ORDER — OXYCODONE AND ACETAMINOPHEN 5; 325 MG/1; MG/1
1 TABLET ORAL EVERY 4 HOURS PRN
Status: DISCONTINUED | OUTPATIENT
Start: 2017-09-11 | End: 2017-09-12 | Stop reason: HOSPADM

## 2017-09-11 RX ORDER — CEFAZOLIN SODIUM 2 G/50ML
2 SOLUTION INTRAVENOUS
Status: COMPLETED | OUTPATIENT
Start: 2017-09-11 | End: 2017-09-11

## 2017-09-11 RX ORDER — PREGABALIN 50 MG/1
50 CAPSULE ORAL 2 TIMES DAILY
Status: DISCONTINUED | OUTPATIENT
Start: 2017-09-11 | End: 2017-09-12 | Stop reason: HOSPADM

## 2017-09-11 RX ORDER — OXYCODONE AND ACETAMINOPHEN 10; 325 MG/1; MG/1
1 TABLET ORAL EVERY 4 HOURS PRN
Status: DISCONTINUED | OUTPATIENT
Start: 2017-09-11 | End: 2017-09-12 | Stop reason: HOSPADM

## 2017-09-11 RX ORDER — ACETAMINOPHEN 325 MG/1
650 TABLET ORAL EVERY 4 HOURS PRN
Status: DISCONTINUED | OUTPATIENT
Start: 2017-09-11 | End: 2017-09-12 | Stop reason: HOSPADM

## 2017-09-11 RX ORDER — KETOROLAC TROMETHAMINE 30 MG/ML
30 INJECTION, SOLUTION INTRAMUSCULAR; INTRAVENOUS EVERY 6 HOURS
Status: DISCONTINUED | OUTPATIENT
Start: 2017-09-11 | End: 2017-09-12 | Stop reason: HOSPADM

## 2017-09-11 RX ORDER — ONDANSETRON 8 MG/1
8 TABLET, ORALLY DISINTEGRATING ORAL EVERY 8 HOURS PRN
Status: DISCONTINUED | OUTPATIENT
Start: 2017-09-11 | End: 2017-09-12 | Stop reason: HOSPADM

## 2017-09-11 RX ORDER — DEXAMETHASONE SODIUM PHOSPHATE 4 MG/ML
INJECTION, SOLUTION INTRA-ARTICULAR; INTRALESIONAL; INTRAMUSCULAR; INTRAVENOUS; SOFT TISSUE
Status: DISCONTINUED | OUTPATIENT
Start: 2017-09-11 | End: 2017-09-11

## 2017-09-11 RX ORDER — KETOROLAC TROMETHAMINE 30 MG/ML
INJECTION, SOLUTION INTRAMUSCULAR; INTRAVENOUS
Status: DISCONTINUED | OUTPATIENT
Start: 2017-09-11 | End: 2017-09-11

## 2017-09-11 RX ORDER — FENTANYL CITRATE 50 UG/ML
INJECTION, SOLUTION INTRAMUSCULAR; INTRAVENOUS
Status: DISCONTINUED | OUTPATIENT
Start: 2017-09-11 | End: 2017-09-11

## 2017-09-11 RX ORDER — LIDOCAINE HYDROCHLORIDE 20 MG/ML
INJECTION, SOLUTION EPIDURAL; INFILTRATION; INTRACAUDAL; PERINEURAL
Status: DISCONTINUED | OUTPATIENT
Start: 2017-09-11 | End: 2017-09-11

## 2017-09-11 RX ORDER — ROCURONIUM BROMIDE 10 MG/ML
INJECTION, SOLUTION INTRAVENOUS
Status: DISCONTINUED | OUTPATIENT
Start: 2017-09-11 | End: 2017-09-11

## 2017-09-11 RX ORDER — ONDANSETRON HYDROCHLORIDE 2 MG/ML
INJECTION, SOLUTION INTRAMUSCULAR; INTRAVENOUS
Status: DISCONTINUED | OUTPATIENT
Start: 2017-09-11 | End: 2017-09-11

## 2017-09-11 RX ORDER — PROPOFOL 10 MG/ML
VIAL (ML) INTRAVENOUS
Status: DISCONTINUED | OUTPATIENT
Start: 2017-09-11 | End: 2017-09-11

## 2017-09-11 RX ORDER — SIMETHICONE 80 MG
80 TABLET,CHEWABLE ORAL EVERY 4 HOURS PRN
Status: DISCONTINUED | OUTPATIENT
Start: 2017-09-11 | End: 2017-09-12 | Stop reason: HOSPADM

## 2017-09-11 RX ORDER — MIDAZOLAM HYDROCHLORIDE 1 MG/ML
INJECTION INTRAMUSCULAR; INTRAVENOUS
Status: DISCONTINUED | OUTPATIENT
Start: 2017-09-11 | End: 2017-09-11

## 2017-09-11 RX ORDER — SODIUM CHLORIDE, SODIUM LACTATE, POTASSIUM CHLORIDE, CALCIUM CHLORIDE 600; 310; 30; 20 MG/100ML; MG/100ML; MG/100ML; MG/100ML
INJECTION, SOLUTION INTRAVENOUS CONTINUOUS PRN
Status: DISCONTINUED | OUTPATIENT
Start: 2017-09-11 | End: 2017-09-11

## 2017-09-11 RX ORDER — DIPHENHYDRAMINE HCL 25 MG
25 CAPSULE ORAL EVERY 4 HOURS PRN
Status: DISCONTINUED | OUTPATIENT
Start: 2017-09-11 | End: 2017-09-12 | Stop reason: HOSPADM

## 2017-09-11 RX ORDER — GLYCOPYRROLATE 0.2 MG/ML
INJECTION INTRAMUSCULAR; INTRAVENOUS
Status: DISCONTINUED | OUTPATIENT
Start: 2017-09-11 | End: 2017-09-11

## 2017-09-11 RX ORDER — NEOSTIGMINE METHYLSULFATE 1 MG/ML
INJECTION, SOLUTION INTRAVENOUS
Status: DISCONTINUED | OUTPATIENT
Start: 2017-09-11 | End: 2017-09-11

## 2017-09-11 RX ORDER — HYDROMORPHONE HYDROCHLORIDE 2 MG/ML
1 INJECTION, SOLUTION INTRAMUSCULAR; INTRAVENOUS; SUBCUTANEOUS EVERY 6 HOURS PRN
Status: DISCONTINUED | OUTPATIENT
Start: 2017-09-11 | End: 2017-09-12 | Stop reason: HOSPADM

## 2017-09-11 RX ORDER — KETAMINE HYDROCHLORIDE 100 MG/ML
INJECTION, SOLUTION INTRAMUSCULAR; INTRAVENOUS
Status: DISCONTINUED | OUTPATIENT
Start: 2017-09-11 | End: 2017-09-11

## 2017-09-11 RX ORDER — IBUPROFEN 800 MG/1
800 TABLET ORAL EVERY 8 HOURS
Status: DISCONTINUED | OUTPATIENT
Start: 2017-09-12 | End: 2017-09-12 | Stop reason: HOSPADM

## 2017-09-11 RX ADMIN — FENTANYL CITRATE 50 MCG: 50 INJECTION, SOLUTION INTRAMUSCULAR; INTRAVENOUS at 04:09

## 2017-09-11 RX ADMIN — FENTANYL CITRATE 150 MCG: 50 INJECTION, SOLUTION INTRAMUSCULAR; INTRAVENOUS at 03:09

## 2017-09-11 RX ADMIN — KETOROLAC TROMETHAMINE 30 MG: 30 INJECTION, SOLUTION INTRAMUSCULAR; INTRAVENOUS at 04:09

## 2017-09-11 RX ADMIN — GLYCOPYRROLATE 0.4 MG: 0.2 INJECTION INTRAMUSCULAR; INTRAVENOUS at 04:09

## 2017-09-11 RX ADMIN — SODIUM CHLORIDE, SODIUM LACTATE, POTASSIUM CHLORIDE, AND CALCIUM CHLORIDE: .6; .31; .03; .02 INJECTION, SOLUTION INTRAVENOUS at 04:09

## 2017-09-11 RX ADMIN — OXYCODONE HYDROCHLORIDE AND ACETAMINOPHEN 1 TABLET: 5; 325 TABLET ORAL at 08:09

## 2017-09-11 RX ADMIN — PREGABALIN 50 MG: 50 CAPSULE ORAL at 08:09

## 2017-09-11 RX ADMIN — NEOSTIGMINE METHYLSULFATE 3 MG: 1 INJECTION INTRAVENOUS at 04:09

## 2017-09-11 RX ADMIN — ONDANSETRON 4 MG: 2 INJECTION, SOLUTION INTRAMUSCULAR; INTRAVENOUS at 04:09

## 2017-09-11 RX ADMIN — PROPOFOL 160 MG: 10 INJECTION, EMULSION INTRAVENOUS at 03:09

## 2017-09-11 RX ADMIN — SODIUM CHLORIDE, SODIUM LACTATE, POTASSIUM CHLORIDE, AND CALCIUM CHLORIDE: .6; .31; .03; .02 INJECTION, SOLUTION INTRAVENOUS at 03:09

## 2017-09-11 RX ADMIN — FENTANYL CITRATE 50 MCG: 50 INJECTION, SOLUTION INTRAMUSCULAR; INTRAVENOUS at 03:09

## 2017-09-11 RX ADMIN — SIMETHICONE CHEW TAB 80 MG 80 MG: 80 TABLET ORAL at 08:09

## 2017-09-11 RX ADMIN — KETOROLAC TROMETHAMINE 30 MG: 30 INJECTION, SOLUTION INTRAMUSCULAR at 10:09

## 2017-09-11 RX ADMIN — DEXAMETHASONE SODIUM PHOSPHATE 8 MG: 4 INJECTION, SOLUTION INTRAMUSCULAR; INTRAVENOUS at 03:09

## 2017-09-11 RX ADMIN — LIDOCAINE HYDROCHLORIDE 50 MG: 20 INJECTION, SOLUTION EPIDURAL; INFILTRATION; INTRACAUDAL; PERINEURAL at 03:09

## 2017-09-11 RX ADMIN — ACETAMINOPHEN 1000 MG: 10 INJECTION, SOLUTION INTRAVENOUS at 04:09

## 2017-09-11 RX ADMIN — CEFAZOLIN SODIUM 2 G: 2 SOLUTION INTRAVENOUS at 03:09

## 2017-09-11 RX ADMIN — ROCURONIUM BROMIDE 10 MG: 10 INJECTION, SOLUTION INTRAVENOUS at 03:09

## 2017-09-11 RX ADMIN — MIDAZOLAM HYDROCHLORIDE 2 MG: 1 INJECTION, SOLUTION INTRAMUSCULAR; INTRAVENOUS at 03:09

## 2017-09-11 RX ADMIN — ROCURONIUM BROMIDE 40 MG: 10 INJECTION, SOLUTION INTRAVENOUS at 03:09

## 2017-09-11 RX ADMIN — KETAMINE HYDROCHLORIDE 50 MG: 100 INJECTION, SOLUTION, CONCENTRATE INTRAMUSCULAR; INTRAVENOUS at 04:09

## 2017-09-11 NOTE — TRANSFER OF CARE
"Anesthesia Transfer of Care Note    Patient: Aleah Hagan    Procedure(s) Performed: Procedure(s) (LRB):  TOTAL LAPAROSCOPIC HYSTERECTOMY (N/A)  SALPINGECTOMY-LAPAROSCOPIC (Bilateral)    Patient location: PACU    Anesthesia Type: general    Transport from OR: Transported from OR on 2-3 L/min O2 by NC with adequate spontaneous ventilation    Post pain: adequate analgesia    Post assessment: no apparent anesthetic complications and tolerated procedure well    Post vital signs: stable    Level of consciousness: sedated    Nausea/Vomiting: no nausea/vomiting    Complications: none          Last vitals:   Visit Vitals  /78 (BP Location: Left arm, Patient Position: Standing)   Pulse 70   Temp 36.3 °C (97.4 °F) (Oral)   Resp 15   Ht 5' 7" (1.702 m)   Wt 73 kg (161 lb)   LMP 02/01/2015   SpO2 98%   Breastfeeding? No   BMI 25.22 kg/m²     "

## 2017-09-11 NOTE — ANESTHESIA PREPROCEDURE EVALUATION
09/11/2017  Aleah Hagan is a 52 y.o., female.    Anesthesia Evaluation    I have reviewed the Patient Summary Reports.    I have reviewed the Nursing Notes.   I have reviewed the Medications.     Review of Systems  Anesthesia Hx:  No problems with previous Anesthesia    Social:  Smoker, No Alcohol Use    Hematology/Oncology:  Hematology Normal   Oncology Normal     EENT/Dental:EENT/Dental Normal   Cardiovascular:  Cardiovascular Normal Exercise tolerance: good     Pulmonary:  Pulmonary Normal    Renal/:  Renal/ Normal     Hepatic/GI:  Hepatic/GI Normal    Musculoskeletal:   Arthritis     Neurological:  Neurology Normal    Endocrine:  Endocrine Normal    Dermatological:  Skin Normal    Psych:  Psychiatric Normal           Physical Exam  General:  Well nourished    Airway/Jaw/Neck:  Airway Findings: Mouth Opening: Normal Tongue: Normal  General Airway Assessment: Adult  Mallampati: II  TM Distance: Normal, at least 6 cm  Jaw/Neck Findings:     Neck ROM: Normal ROM      Dental:  Dental Findings: In tact        Mental Status:  Mental Status Findings:  Cooperative         Anesthesia Plan  Type of Anesthesia, risks & benefits discussed:  Anesthesia Type:  general  Patient's Preference:   Intra-op Monitoring Plan: standard ASA monitors  Intra-op Monitoring Plan Comments:   Post Op Pain Control Plan: multimodal analgesia  Post Op Pain Control Plan Comments:   Induction:   IV  Beta Blocker:  Patient is not currently on a Beta-Blocker (No further documentation required).       Informed Consent: Patient understands risks and agrees with Anesthesia plan.  Questions answered. Anesthesia consent signed with patient.  ASA Score: 2     Day of Surgery Review of History & Physical: I have interviewed and examined the patient. I have reviewed the patient's H&P dated: 9/11/17. There are no significant changes.  H&P  update referred to the surgeon.         Ready For Surgery From Anesthesia Perspective.

## 2017-09-11 NOTE — INTERVAL H&P NOTE
The patient has been examined and the H&P has been reviewed:        I concur with the findings and no changes have occurred since H&P was written.        Patient cleared for Anesthesia: General        Anesthesia/Surgery risks, benefits and alternative options discussed and understood by patient/family.      Active Hospital Problems    Diagnosis  POA    ANH II (cervical intraepithelial neoplasia II) [N87.1]  Yes      Resolved Hospital Problems    Diagnosis Date Resolved POA   No resolved problems to display.

## 2017-09-11 NOTE — OP NOTE
Surgery Date: 2017     Surgeon(s) and Role:   * Cisco Deleon MD - Primary     * Aryan Lomeli MD - Assisting       Pre-op Diagnosis:    Dysplasia of cervix, high grade ANH 2 [N87.1]    Post-op Diagnosis:    Same    Procedure(s):  Procedure(s):  TOTAL LAPAROSCOPIC HYSTERECTOMY  SALPINGECTOMY-LAPAROSCOPIC    Anesthesia: General    Specimen: Uterus, cervix, bilateral fallopian tubes    EBL: 50 mL    Complications: none    Procedure in Detail:  The patient was placed on the operating table in the dorsal supine position and given satisfactory general endotracheal anesthesia.  She was then placed in the lithotomy position. The abdomen was prepped with Duraprep and the vagina was prepped with Hibiclens surgical prep. A Shu catheter was placed into the bladder for drainage.  She was then draped in the usual manner for laparoscopic procedure.     After assuring adequate anesthesia, a 1 cm infraumbilical skin incision was then made. The abdomen was elevated with an [facility] clamp, and a Veress needle was introduced into the abdomen without difficulty. The abdomen was inflated with CO2 to a pressure of 15mm Hg.  The Veress needle was removed and a 10 mm Optiview port was then placed in the umbilicus without difficulty using the laparoscope as guidance for entering the abdomen. Following this a 10mm port was also placed in the right and left lower quadrants under direct visualization.    The pelvis was inspected. The uterus appeared nonenlarged. The previous  section bladder flap scar was evident. The ovaries appeared normal bilaterally. The pelvic peritoneum appeared Normal. The upper abdomen was inspected and the liver, appendix and bowels appeared Normal. There was a small omental adhesion left lateral to the umbilicus.     At this time, with the assistant elevating the uterus using an atraumatic grasper, the right fallopian tube was excised along the mesosalpinx with the Sonicision.  The round  ligament was then grasped with the Sonicision scalpel and transected without difficulty.  Next, the utero-ovarian ligament was transected with the Sonicision scalpel with good hemostasis.  The right side of the bladder flap was then created using the  Sonicision scalpel to incise the anterior peritoneum of the broad ligament in [place] fashion.  The uterine vessels were skeletonized on the right side, coagulated with bipolar current using a Gyrus Hilliard forceps,  and then transected using the Sonicision scalpel without difficulty. At this time the assistant performed the same procedure on the patients left side excising the fallopian tube, dividing the round ligament and utero-ovarian ligament, and completing the bladder flap. The uterine vessels were then skeletonized, coagulated with bipolar current using the Gyrus Hilliard forceps and transected with the  Sonicision scalpel by my assistant.     At this time with both uterine vessels secured and hemostatsis being adequate the procedure was continued. A sponge stick was placed in the vagina to aid in identifying the cervico-vaginal juncion of the anterior vaginal wall.  The bladder was identified and was then mobilized free of the lower uterine segment by blunt and sharp dissection using the  scalpel while the assistant provided traction on the uterus. Once the bladder was dissected free of the vagina and lower uterine segment, the vagina was then incised with the Sonicision scalpel in a transverse fashion overlying the sponge stick in the anterior fornix.  This was done transversely for approximately a 2 cm length.  Following this, the uterus was anteflexed by my assistant and the posterior cul-de-sac was identified using  a sponge stick within the vagina.  Again, the  Sonicision scalpel was used to incise the posterior vagina transversely between the utero-sacral ligaments for approximately a 2 cm distance.     With both incisions made in the vagina, a small  amount of air began to leak out.  The vagina was packed with a wet towel to secure pneumoperitoneum and the procedure was continued.  The  Sonicision scalpel was used to transect the cardinal ligaments and then the utero-sacral ligaments, first on the right side by me and then the left side by my assistant  The remaining portions of the vaginal attachments were then cut with the  Sonicision scalpel and the cervix was removed from the vagina using a single tooth tenaculum to grasp the cervix after being positioned at the vaginal opening by the assistant.    The Vaginal pack was replaced to secure pneumoperitoneum and the procedure was continued laparoscopically.    The vaginal cuff was the sutured using a interrupted 2-0 Vicryl suture placed across the anterior and posterior vaginal walls using the AutoSuture Endo-Stitch device. This was accomplished with the assistant elevating the vaginal cuff with an endoscopic 5mm forceps. The sutures were tied extracorporeally and secured with a knot pusher.     The ureters were identified bilaterally with peristalsis noted lateral to the operative field.     At this time, with all pedicles hemostatic, the abdomen was deflated of CO2  and again inspected for bleeding. With no bleeding noted the abdomen was reinflated with CO2. At this point, the pelvis was irrigated with saline.  The abdomen was again deflated of CO2. The ports were removed and the skin incisions were closed with 3-0 Dexon subcuticular stitch.       The patient tolerated the procedure well and left the operating room awake, alert, and with vital signs stable.      Estimated blood loss was approximately 50cc.

## 2017-09-11 NOTE — ANESTHESIA POSTPROCEDURE EVALUATION
"Anesthesia Post Evaluation    Patient: Aleah Hagan    Procedure(s) Performed: Procedure(s) (LRB):  TOTAL LAPAROSCOPIC HYSTERECTOMY (N/A)  SALPINGECTOMY-LAPAROSCOPIC (Bilateral)    Final Anesthesia Type: general  Patient location during evaluation: PACU  Patient participation: Yes- Able to Participate  Level of consciousness: awake and alert, oriented and awake  Post-procedure vital signs: reviewed and stable  Pain management: adequate  Airway patency: patent  PONV status at discharge: No PONV  Anesthetic complications: no      Cardiovascular status: blood pressure returned to baseline  Respiratory status: unassisted, spontaneous ventilation and room air  Hydration status: euvolemic  Follow-up not needed.  Comments: [facility]        Visit Vitals  BP (!) 101/57 (BP Location: Left arm, Patient Position: Lying)   Pulse 62   Temp 35.9 °C (96.7 °F) (Oral)   Resp 15   Ht 5' 7" (1.702 m)   Wt 73 kg (161 lb)   LMP 02/01/2015   SpO2 97%   Breastfeeding? No   BMI 25.22 kg/m²       Pain/Kate Score: Pain Assessment Performed: Yes (9/11/2017  6:00 PM)  Presence of Pain: denies (9/11/2017  6:00 PM)  Kate Score: 10 (9/11/2017  5:47 PM)      "

## 2017-09-12 LAB
BASOPHILS # BLD AUTO: 0.01 K/UL
BASOPHILS NFR BLD: 0.1 %
DIFFERENTIAL METHOD: ABNORMAL
EOSINOPHIL # BLD AUTO: 0 K/UL
EOSINOPHIL NFR BLD: 0 %
ERYTHROCYTE [DISTWIDTH] IN BLOOD BY AUTOMATED COUNT: 13.9 %
HCT VFR BLD AUTO: 38.3 %
HGB BLD-MCNC: 12.7 G/DL
LYMPHOCYTES # BLD AUTO: 1.1 K/UL
LYMPHOCYTES NFR BLD: 13.7 %
MCH RBC QN AUTO: 28.7 PG
MCHC RBC AUTO-ENTMCNC: 33.2 G/DL
MCV RBC AUTO: 87 FL
MONOCYTES # BLD AUTO: 0.3 K/UL
MONOCYTES NFR BLD: 3.6 %
NEUTROPHILS # BLD AUTO: 6.4 K/UL
NEUTROPHILS NFR BLD: 82.6 %
PLATELET # BLD AUTO: 239 K/UL
PMV BLD AUTO: 10.3 FL
RBC # BLD AUTO: 4.42 M/UL
WBC # BLD AUTO: 7.76 K/UL

## 2017-09-12 PROCEDURE — 99900035 HC TECH TIME PER 15 MIN (STAT)

## 2017-09-12 PROCEDURE — 36415 COLL VENOUS BLD VENIPUNCTURE: CPT

## 2017-09-12 PROCEDURE — 25000003 PHARM REV CODE 250: Performed by: OBSTETRICS & GYNECOLOGY

## 2017-09-12 PROCEDURE — 94799 UNLISTED PULMONARY SVC/PX: CPT

## 2017-09-12 PROCEDURE — 63600175 PHARM REV CODE 636 W HCPCS: Performed by: OBSTETRICS & GYNECOLOGY

## 2017-09-12 PROCEDURE — 94760 N-INVAS EAR/PLS OXIMETRY 1: CPT

## 2017-09-12 PROCEDURE — 85025 COMPLETE CBC W/AUTO DIFF WBC: CPT

## 2017-09-12 RX ORDER — OXYCODONE AND ACETAMINOPHEN 5; 325 MG/1; MG/1
1 TABLET ORAL EVERY 4 HOURS PRN
Qty: 20 TABLET | Refills: 0 | Status: SHIPPED | OUTPATIENT
Start: 2017-09-12 | End: 2017-10-31

## 2017-09-12 RX ORDER — IBUPROFEN 800 MG/1
800 TABLET ORAL EVERY 8 HOURS
Qty: 30 TABLET | Refills: 0 | Status: ON HOLD | OUTPATIENT
Start: 2017-09-12 | End: 2017-11-17 | Stop reason: HOSPADM

## 2017-09-12 RX ADMIN — KETOROLAC TROMETHAMINE 30 MG: 30 INJECTION, SOLUTION INTRAMUSCULAR at 05:09

## 2017-09-12 RX ADMIN — OXYCODONE HYDROCHLORIDE AND ACETAMINOPHEN 1 TABLET: 5; 325 TABLET ORAL at 09:09

## 2017-09-12 RX ADMIN — SIMETHICONE CHEW TAB 80 MG 80 MG: 80 TABLET ORAL at 09:09

## 2017-09-12 RX ADMIN — PREGABALIN 50 MG: 50 CAPSULE ORAL at 09:09

## 2017-09-12 NOTE — SUBJECTIVE & OBJECTIVE
Interval History: routine pp care. Doing well. Tolerating diet.  Shadi d/c'win this am. Pain controlled with pain meds.    Scheduled Meds:   ketorolac  30 mg Intravenous Q6H    Followed by    ibuprofen  800 mg Oral Q8H    pregabalin  50 mg Oral BID     Continuous Infusions:   PRN Meds:acetaminophen, diphenhydrAMINE, HYDROmorphone, ondansetron, oxycodone-acetaminophen, oxycodone-acetaminophen, promethazine (PHENERGAN) IVPB, simethicone    Review of patient's allergies indicates:   Allergen Reactions    Codeine Hives    Shrimp Hives       Objective:     Vital Signs (Most Recent):  Temp: 96.6 °F (35.9 °C) (09/12/17 0743)  Pulse: 70 (09/12/17 0743)  Resp: 15 (09/12/17 0743)  BP: 111/63 (09/12/17 0743)  SpO2: 96 % (09/12/17 0743) Vital Signs (24h Range):  Temp:  [96.6 °F (35.9 °C)-98.1 °F (36.7 °C)] 96.6 °F (35.9 °C)  Pulse:  [50-77] 70  Resp:  [15-18] 15  SpO2:  [94 %-98 %] 96 %  BP: ()/(51-78) 111/63     Weight: 73 kg (161 lb)  Body mass index is 25.22 kg/m².  Patient's last menstrual period was 02/01/2015.    I&O (Last 24H):      Intake/Output Summary (Last 24 hours) at 09/12/17 0918  Last data filed at 09/12/17 0623    <table CELLSPACING=0 CELLPADDING=0 BORDER=1>  <tr><td QCSDY=441%></td>  <td JQTHI=054%><b>Gross per 24 hour</b></td></tr>  <tr><td MERGM=740%>Intake</td>  <td OTFFN=853%>             2400 ml</td></tr>  <tr><td JCVHG=748%>Output</td>  <td FNVZO=786%>             1300 ml</td></tr>  <tr><td TEWUN=143%><b>Net</b></td>  <td MNZCV=221%>             1100 ml</td></tr>  </table>      Laboratory:  CBC:   Recent Labs  Lab 09/12/17  0615   WBC 7.76   RBC 4.42   HGB 12.7   HCT 38.3      MCV 87   MCH 28.7   MCHC 33.2         Physical Exam:   Constitutional: She is oriented to person, place, and time. She appears well-developed and well-nourished. No distress.    HENT:   Head: Normocephalic and atraumatic.    Eyes: Conjunctivae and EOM are normal.    Neck: Normal range of motion. Neck supple.      Pulmonary/Chest: Effort normal.        Abdominal: Soft. Abdominal incision: intact x3. There is tenderness (appropriate). There is no rebound and no guarding.             Musculoskeletal: Normal range of motion.       Neurological: She is alert and oriented to person, place, and time.    Skin: Skin is warm and dry.    Psychiatric: She has a normal mood and affect. Her behavior is normal. Judgment and thought content normal.

## 2017-09-12 NOTE — DISCHARGE INSTRUCTIONS
Discharge Instructions for Laparoscopic Hysterectomy  You had a procedure called laparoscopic hysterectomy. A surgeon removed your uterus using instruments inserted through small incisions in your abdomen. These incisions may be tender or sore. You may also have pain in your upper back or shoulders. This is from the gas used to enlarge your abdomen to allow your doctor to see inside your pelvis and perform the procedure. This pain usually goes away in a day or two. It usually takes from 1 to 4 weeks to recover from laparoscopic hysterectomy. Remember, though, that recovery time varies from woman to woman. Here's what you can do to speed your recovery following surgery.  Home care   · Continue the coughing and deep breathing exercises that you learned in the hospital.  · Take your medications exactly as directed by your doctor.  · Avoid constipation.  ¨ Eat fruits, vegetables, and whole grains.  ¨ Drink 6 to 8 glasses of water a day, unless told to do otherwise.  ¨ Use a laxative or a mild stool softener if your doctor says it's OK.  · Shower as usual. Wash your incisions with mild soap and water. Pat dry.  · Don't use oils, powders, or lotions on your incisions.  · Don't put anything in your vagina until your doctor says it's safe to do so. Don't use tampons or douches. Don't have sex.  · If you had both ovaries removed, report hot flashes, mood swings, and irritability to your doctor. There may be medications that can help you.  Activity  · Ask your doctor when you can start driving again. It's usually okay to drive as soon as you are free of pain and able to move comfortably from side to side. Don't drive while you are still taking opioid pain medications.  · Ask others to help with chores and errands while you recover.  · Dont lift anything heavier than 10 pounds for 6 weeks.  · Dont vacuum or do other strenuous activities until the doctor says it's OK.  · Walk as often as you feel able.  · Don't drive for a  few days after the surgery. You may drive as soon as you are able to move comfortably from side to side and when you are no longer taking narcotics.  · Climb stairs slowly and pause after every few steps.  Follow-up care  Make a follow-up appointment as directed by our staff.     When to call your doctor  Call your doctor right away if you have any of the following:  · Fever above 100.4°F (38°C) or chills  · Bright red vaginal bleeding or vaginal bleeding that soaks more than one sanitary pad per hour  · A foul smelling discharge from the vagina  · Trouble urinating or burning when you urinate  · Severe pain or bloating in your abdomen  · Redness, swelling, or drainage at your incision sites  · Shortness of breath or chest pain  · Nausea and vomiting   Date Last Reviewed: 5/19/2015  © 8669-7414 [employer]. [address] [place] [id number]. All rights reserved. This information is not intended as a substitute for professional medical care. Always follow your healthcare professional's instructions.

## 2017-09-12 NOTE — HPI
Aleah Hagan is a 52 y.o. female  for preop examination.  She is scheduled for a TLH/BS on 17. Patient has been followed for 2 years with abnormal pap smears and abnormal cervical pathology.    Pap history as follows:  2015:               pap with LGSIL and cells worrisome for high grade lesion, HPV positive              Colposcopy with biopsy of CIN2  2016:              LEEP with ANH 1-2  2017:              Pap with LGSIL and HPV positive              Colposcopy with biopsy of CIN2     Secondary to persistence of CIN2 despite LEEP procedure, patient desires definitive surgical procedure of hysterectomy with cervical excision.

## 2017-09-12 NOTE — PROGRESS NOTES
Vitals remained stable, afebrile. Pain controled with PO meds. ambulating and eating well. Voiding well. Incision WNL. Prescriptions delivered to room. Discussed d/c, stated understanding. IV removed and site wrapped. AVS given to pt

## 2017-09-12 NOTE — DISCHARGE SUMMARY
[facility]  Obstetrics & Gynecology  Discharge Summary    Patient Name: Aleah Hagan  MRN: [id number]  Admission Date: 2017  Hospital Length of Stay: 0 days  Discharge Date and Time:  2017 9:22 AM  Attending Physician: Cisco Deleon MD   Discharging Provider: Cisco Deleon MD  Primary Care Provider: Binu Burnett MD    HPI:  Aleah Hagan is a 52 y.o. female  for preop examination.  She is scheduled for a TLH/BS on 17. Patient has been followed for 2 years with abnormal pap smears and abnormal cervical pathology.    Pap history as follows:  2015:               pap with LGSIL and cells worrisome for high grade lesion, HPV positive              Colposcopy with biopsy of CIN2  2016:              LEEP with ANH 1-2  2017:              Pap with LGSIL and HPV positive              Colposcopy with biopsy of CIN2     Secondary to persistence of CIN2 despite LEEP procedure, patient desires definitive surgical procedure of hysterectomy with cervical excision.     Hospital Course:  Patient presented for scheduled surgery, see op note. Recovered in PACU then transferred to floor for routine postop care.     Procedure(s) (LRB):  TOTAL LAPAROSCOPIC HYSTERECTOMY (N/A)  SALPINGECTOMY-LAPAROSCOPIC (Bilateral)         Significant Diagnostic Studies: Labs:   CBC   Recent Labs  Lab 17  0615   WBC 7.76   HGB 12.7   HCT 38.3          Pending Diagnostic Studies:     None        Final Active Diagnoses:    Diagnosis Date Noted POA    PRINCIPAL PROBLEM:  ANH II (cervical intraepithelial neoplasia II) [N87.1] 02/15/2016 Yes      Problems Resolved During this Admission:    Diagnosis Date Noted Date Resolved POA        Discharged Condition: good    Disposition: Home or Self Care    Follow Up:  Follow-up Information     Binu Burnett MD.    Specialty:  Family Medicine  Why:  outpatient services  Contact information:  [address] [facility]  [place] [address]  [phone]             Cisco Deleon MD In 1 week.    Specialty:  Obstetrics and Gynecology  Why:  For incision re-check  Contact information:  [place] [address]  [phone]                 Patient Instructions:     Diet general     Weight bearing restrictions (specify)     Other restrictions (specify):   Order Comments: Pelvic rest     Call MD for:  temperature >100.4     Call MD for:  persistent nausea and vomiting or diarrhea     Call MD for:  severe uncontrolled pain     Call MD for:  redness, tenderness, or signs of infection (pain, swelling, redness, odor or green/yellow discharge around incision site)     Call MD for:  difficulty breathing or increased cough     Call MD for:  severe persistent headache     Call MD for:  worsening rash     Call MD for:  persistent dizziness, light-headedness, or visual disturbances     Call MD for:  increased confusion or weakness     Call MD for:   Order Comments: Obstetric patients: Call for decreased fetal movement, regular uterine contractions, leakage of fluid, vaginal bleeding or any other concerns  Gynecology patients: Call for incisional drainage, redness, or tenderness, abnormal vaginal bleeding or discharge or any other concerns      No dressing needed       Medications:  Reconciled Home Medications:   Current Discharge Medication List      START taking these medications    Details   ibuprofen (ADVIL,MOTRIN) 800 MG tablet Take 1 tablet (800 mg total) by mouth every 8 (eight) hours.  Qty: 30 tablet, Refills: 0      oxycodone-acetaminophen (PERCOCET) 5-325 mg per tablet Take 1 tablet by mouth every 4 (four) hours as needed.  Qty: 20 tablet, Refills: 0         CONTINUE these medications which have NOT CHANGED    Details   cyanocobalamin, vitamin B-12, 5,000 mcg TbDL Take 1 tablet by mouth once daily.      multivitamin (ONE DAILY MULTIVITAMIN) per tablet Take 1 tablet by mouth once daily.      omeprazole (PRILOSEC) 40 MG capsule  Take 1 capsule (40 mg total) by mouth once daily.  Qty: 30 capsule, Refills: 0    Associated Diagnoses: Reflux gastritis      pregabalin (LYRICA) 50 MG capsule Take 1 capsule (50 mg total) by mouth 2 (two) times daily.  Qty: 60 capsule, Refills: 5      zolpidem (AMBIEN) 5 MG Tab Take 1 tablet (5 mg total) by mouth nightly as needed.  Qty: 15 tablet, Refills: 0      PHENAZOPYRIDINE HCL (AZO ORAL) Take 2 tablets by mouth 3 (three) times daily.          STOP taking these medications       duloxetine (CYMBALTA) 30 MG capsule Comments:   Reason for Stopping:               Cisco Deleon MD  Obstetrics & Gynecology  [facility]

## 2017-09-12 NOTE — PLAN OF CARE
Problem: Patient Care Overview  Goal: Plan of Care Review  Outcome: Ongoing (interventions implemented as appropriate)  Quiet shift. Pain controlled with PO medication and Toradol. Incisions intact. Hsu patent. Saline lock intact. Plan of care discussed and verbalizes understanding. Call bell in reach and family at bedside.

## 2017-09-12 NOTE — PROGRESS NOTES
[facility]  Obstetrics & Gynecology  Progress Note    Patient Name: Aleah Hagan  MRN: [id number]  Admission Date: 2017  Primary Care Provider: Binu Burnett MD  Principal Problem: ANH II (cervical intraepithelial neoplasia II)    Subjective:     HPI:  Aleah Hagan is a 52 y.o. female  for preop examination.  She is scheduled for a TLH/BS on 17. Patient has been followed for 2 years with abnormal pap smears and abnormal cervical pathology.    Pap history as follows:  2015:               pap with LGSIL and cells worrisome for high grade lesion, HPV positive              Colposcopy with biopsy of CIN2  2016:              LEEP with ANH 1-2  2017:              Pap with LGSIL and HPV positive              Colposcopy with biopsy of CIN2     Secondary to persistence of CIN2 despite LEEP procedure, patient desires definitive surgical procedure of hysterectomy with cervical excision.     Interval History: routine pp care. Doing well. Tolerating diet.  Hsu d/c'd this am. Pain controlled with pain meds.    Scheduled Meds:   ketorolac  30 mg Intravenous Q6H    Followed by    ibuprofen  800 mg Oral Q8H    pregabalin  50 mg Oral BID     Continuous Infusions:   PRN Meds:acetaminophen, diphenhydrAMINE, HYDROmorphone, ondansetron, oxycodone-acetaminophen, oxycodone-acetaminophen, promethazine (PHENERGAN) IVPB, simethicone    Review of patient's allergies indicates:   Allergen Reactions    Codeine Hives    Shrimp Hives       Objective:     Vital Signs (Most Recent):  Temp: 96.6 °F (35.9 °C) (17 0743)  Pulse: 70 (17 0743)  Resp: 15 (17 0743)  BP: 111/63 (17 0743)  SpO2: 96 % (17 0743) Vital Signs (24h Range):  Temp:  [96.6 °F (35.9 °C)-98.1 °F (36.7 °C)] 96.6 °F (35.9 °C)  Pulse:  [50-77] 70  Resp:  [15-18] 15  SpO2:  [94 %-98 %] 96 %  BP: ()/(51-78) 111/63     Weight: 73 kg (161 lb)  Body mass index is 25.22 kg/m².  Patient's last menstrual  period was 02/01/2015.    I&O (Last 24H):      Intake/Output Summary (Last 24 hours) at 09/12/17 0918  Last data filed at 09/12/17 0623    <table CELLSPACING=0 CELLPADDING=0 BORDER=1>  <tr><td QWPFN=278%></td>  <td ISYSG=885%><b>Gross per 24 hour</b></td></tr>  <tr><td NKOZO=305%>Intake</td>  <td DXKDA=060%>             2400 ml</td></tr>  <tr><td LNWRD=816%>Output</td>  <td YGDSW=828%>             1300 ml</td></tr>  <tr><td ZHGDC=733%><b>Net</b></td>  <td MBKTE=274%>             1100 ml</td></tr>  </table>      Laboratory:  CBC:   Recent Labs  Lab 09/12/17  0615   WBC 7.76   RBC 4.42   HGB 12.7   HCT 38.3      MCV 87   MCH 28.7   MCHC 33.2         Physical Exam:   Constitutional: She is oriented to person, place, and time. She appears well-developed and well-nourished. No distress.    HENT:   Head: Normocephalic and atraumatic.    Eyes: Conjunctivae and EOM are normal.    Neck: Normal range of motion. Neck supple.     Pulmonary/Chest: Effort normal.        Abdominal: Soft. Abdominal incision: intact x3. There is tenderness (appropriate). There is no rebound and no guarding.             Musculoskeletal: Normal range of motion.       Neurological: She is alert and oriented to person, place, and time.    Skin: Skin is warm and dry.    Psychiatric: She has a normal mood and affect. Her behavior is normal. Judgment and thought content normal.       Assessment/Plan:     * ANH II (cervical intraepithelial neoplasia II)    S/p TLH/BS. Doing well, routine post op care.  Discharge when meeting all criteria.            Cisco Deleon MD  Obstetrics & Gynecology  [facility]

## 2017-09-13 VITALS
BODY MASS INDEX: 25.27 KG/M2 | RESPIRATION RATE: 15 BRPM | HEIGHT: 67 IN | WEIGHT: 161 LBS | TEMPERATURE: 97 F | DIASTOLIC BLOOD PRESSURE: 63 MMHG | OXYGEN SATURATION: 96 % | HEART RATE: 70 BPM | SYSTOLIC BLOOD PRESSURE: 111 MMHG

## 2017-09-18 NOTE — PHYSICIAN QUERY
PT Name: Aleah Hagan  MR #: [id number]    Physician Query Form - Pathology Findings Clarification     CDS/: Eileen Boyce               Contact information: [email or web address]  This form is a permanent document in the medical record.     Query Date: September 18, 2017      By submitting this query, we are merely seeking further clarification of documentation.  Please utilize your independent clinical judgment when addressing the question(s) below.      The medical record contains the following:     Findings Location in Medical Record   FINAL PATHOLOGIC DIAGNOSIS  (Uterus & cervix, bilateral fallopian tubes, 79 g, TLH-BS):  -Cervix with high-grade dysplasia, moderate to severe (CIN2-3).  -Chronic cervicitis, squamous metaplasia & koilocytotic atypia.  -Negative for invasive neoplasm.  -Margins of excision are negative for dysplasia & malignancy.  -Basal endometrium.  -Myometrial leiomyoma (0.5 cm).  -Bilateral fallopian tubes, no histopathologic abnormality.  -Serosal adhesions.  NOTE: The findings correlate with the previous cervical biopsy ().    Diagnosed by: Mukesh Villegas M.D.  (Electronically Signed: 2017-09-15 13:58:56) Pathology Report     Please document the clinical significance of the Pathologists findings of ANH 3.          [ X ] I agree with the Pathology Findings        [  ] I do not agree with the Pathology Findings        [  ] Clinically Insignificant        [  ] Clinically Undetermined        [  ] Other/Clarification of Findings: ______________________________________________    Please document in your progress notes daily for the duration of treatment until resolved and include in your discharge summary.

## 2017-09-21 ENCOUNTER — OFFICE VISIT (OUTPATIENT)
Dept: OBSTETRICS AND GYNECOLOGY | Facility: CLINIC | Age: 52
End: 2017-09-21
Payer: COMMERCIAL

## 2017-09-21 VITALS
WEIGHT: 166.63 LBS | HEIGHT: 67 IN | DIASTOLIC BLOOD PRESSURE: 72 MMHG | HEART RATE: 67 BPM | RESPIRATION RATE: 13 BRPM | BODY MASS INDEX: 26.15 KG/M2 | SYSTOLIC BLOOD PRESSURE: 130 MMHG

## 2017-09-21 DIAGNOSIS — Z90.710 S/P LAPAROSCOPIC HYSTERECTOMY: ICD-10-CM

## 2017-09-21 DIAGNOSIS — Z09 POSTOPERATIVE EXAMINATION: Primary | ICD-10-CM

## 2017-09-21 DIAGNOSIS — D06.9 CIN III (CERVICAL INTRAEPITHELIAL NEOPLASIA GRADE III) WITH SEVERE DYSPLASIA: ICD-10-CM

## 2017-09-21 PROCEDURE — 99999 PR PBB SHADOW E&M-EST. PATIENT-LVL III: CPT | Mod: PBBFAC,,, | Performed by: OBSTETRICS & GYNECOLOGY

## 2017-09-21 PROCEDURE — 99024 POSTOP FOLLOW-UP VISIT: CPT | Mod: S$GLB,,, | Performed by: OBSTETRICS & GYNECOLOGY

## 2017-09-21 NOTE — PROGRESS NOTES
Obstetrics and Gynecology  Post-operative Progress Note    Chief Complaint   Patient presents with    Post-op Evaluation     [facility] 17       Aleah Hagan is a 52 y.o. female  post-op from a H/BS on 17.  Patient is Doing well postoperatively.  Normal appetite and BM.     The pathology revealed:  FINAL PATHOLOGIC DIAGNOSIS  (Uterus & cervix, bilateral fallopian tubes, 79 g, [facility]-BS):  -Cervix with high-grade dysplasia, moderate to severe (CIN2-3).  -Chronic cervicitis, squamous metaplasia & koilocytotic atypia.  -Negative for invasive neoplasm.  -Margins of excision are negative for dysplasia & malignancy.  -Basal endometrium.  -Myometrial leiomyoma (0.5 cm).  -Bilateral fallopian tubes, no histopathologic abnormality.  -Serosal adhesions.      Past Medical History:   Diagnosis Date    Abnormal Pap smear of cervix 2015    leep done- CIN1    Arthritis     ANH II (cervical intraepithelial neoplasia II)     Disc degeneration     ruptured    Fibromyalgia     Synovial cyst of lumbar spine      Past Surgical History:   Procedure Laterality Date    APPENDECTOMY      age 22    BREAST SURGERY      augmentation    CERVICAL BIOPSY  W/ LOOP ELECTRODE EXCISION  16    ANH 1     SECTION, CLASSIC      x2    CHOLECYSTECTOMY  10/2013    gastric sleeve  13    HYSTERECTOMY  2017    [facility]- cervical dysp    NECK SURGERY      cosmetic surgery    TONSILLECTOMY, ADENOIDECTOMY  age 9     Family History   Problem Relation Age of Onset    Cancer Mother      lung    Cancer Father      lung    Cancer Sister      thyroid    Ovarian cancer Neg Hx     Breast cancer Neg Hx     Colon cancer Neg Hx      Social History   Substance Use Topics    Smoking status: Current Every Day Smoker     Packs/day: 0.30     Years: 10.00     Types: Cigarettes     Start date: 1984    Smokeless tobacco: Never Used      Comment: stopped for 23 years and then started 2 years ago again   "   Alcohol use Yes      Comment: Socially-2 times a month on the weekend-3 drinks each day      OB History    Para Term  AB Living   3 3 2 1   2   SAB TAB Ectopic Multiple Live Births                  # Outcome Date GA Lbr Eduardo/2nd Weight Sex Delivery Anes PTL Lv   3 Term            2             1 Term                   /72   Pulse 67   Resp 13   Ht 5' 7" (1.702 m)   Wt 75.6 kg (166 lb 9.6 oz)   LMP 2015   BMI 26.09 kg/m²     ROS:  GENERAL: No fever, chills, fatigability or weight loss.  VULVAR: No pain, no lesions and no itching.  VAGINAL: No relaxation, no itching, no discharge, no abnormal bleeding and no lesions.  ABDOMEN: No abdominal pain. Denies nausea. Denies vomiting. No diarrhea. No constipation  BREAST: Denies pain. No lumps. No discharge.  URINARY: No incontinence, no nocturia, no frequency and no dysuria.  CARDIOVASCULAR: No chest pain. No shortness of breath. No leg cramps.  NEUROLOGICAL: No headaches. No vision changes.    PE:   /72   Pulse 67   Resp 13   Ht 5' 7" (1.702 m)   Wt 75.6 kg (166 lb 9.6 oz)   LMP 2015   BMI 26.09 kg/m²   General appearance: alert, appears stated age, cooperative and no distress  Head: Normocephalic, without obvious abnormality, atraumatic  Abdomen: soft, nontender.  Incisions c/d/i and healing well  Pelvic: deferred      ASSESSMENT:    1. Postoperative examination     2. S/P laparoscopic hysterectomy     3. ANH III (cervical intraepithelial neoplasia grade III) with severe dysplasia          PLAN:  1. Postoperative examination  Doing well post op  Routine post op care  Pelvic rest    2. S/P laparoscopic hysterectomy    3. ANH III (cervical intraepithelial neoplasia grade III) with severe dysplasia  Pathology reviewed    Follow up in about 5 weeks.     "

## 2017-10-23 ENCOUNTER — OFFICE VISIT (OUTPATIENT)
Dept: OBSTETRICS AND GYNECOLOGY | Facility: CLINIC | Age: 52
End: 2017-10-23
Payer: COMMERCIAL

## 2017-10-23 VITALS
SYSTOLIC BLOOD PRESSURE: 116 MMHG | HEIGHT: 67 IN | DIASTOLIC BLOOD PRESSURE: 84 MMHG | HEART RATE: 80 BPM | RESPIRATION RATE: 16 BRPM | WEIGHT: 167 LBS | BODY MASS INDEX: 26.21 KG/M2

## 2017-10-23 DIAGNOSIS — N64.89 ASYMMETRICAL BREASTS: Primary | ICD-10-CM

## 2017-10-23 DIAGNOSIS — Z09 POSTOPERATIVE FOLLOW-UP: ICD-10-CM

## 2017-10-23 PROCEDURE — 99999 PR PBB SHADOW E&M-EST. PATIENT-LVL III: CPT | Mod: PBBFAC,,, | Performed by: OBSTETRICS & GYNECOLOGY

## 2017-10-23 PROCEDURE — 99024 POSTOP FOLLOW-UP VISIT: CPT | Mod: S$GLB,,, | Performed by: OBSTETRICS & GYNECOLOGY

## 2017-10-23 NOTE — PROGRESS NOTES
Subjective:       Patient ID: Aleah Hagan is a 52 y.o. female.    Chief Complaint:  Post-op Evaluation ([facility] on 2017)      History of Present Illness  Patient presents for 6 week follow-up from [facility].  Patient had a little bit of spotting last week but no other bleeding.  She missed a voiding well and her pain is under control.  Patient would like to be cleared to resume exercising.  Patient also requests follow-up of her right breast.  Patient states that they are asymmetrical when she is worried about rupture.  Patient did have an ultrasound done which was within normal limits but an MRI 0 appropriate studies rule out rupture.  An MRI will be ordered.  Breast counseling done.  Counseling lasted approximately 15 minutes.    Menstrual History:  OB History      Para Term  AB Living    3 3 2 1   2    SAB TAB Ectopic Multiple Live Births                      Menarche age:   Patient's last menstrual period was 2015.         Review of Systems  Review of Systems   Constitutional: Positive for chills and fatigue. Negative for activity change, appetite change, diaphoresis, fever and unexpected weight change.   HENT: Negative for congestion, dental problem, drooling, ear discharge, ear pain, facial swelling, hearing loss, mouth sores, nosebleeds, postnasal drip, rhinorrhea, sinus pressure, sneezing, sore throat, tinnitus, trouble swallowing and voice change.    Eyes: Negative for photophobia, pain, discharge, redness, itching and visual disturbance.   Respiratory: Negative for apnea, cough, choking, chest tightness, shortness of breath, wheezing and stridor.    Cardiovascular: Negative for chest pain, palpitations and leg swelling.   Gastrointestinal: Negative for abdominal distention, abdominal pain, anal bleeding, blood in stool, constipation, diarrhea, nausea, rectal pain and vomiting.   Endocrine: Positive for cold intolerance. Negative for heat intolerance, polydipsia, polyphagia and  polyuria.   Genitourinary: Positive for urgency. Negative for decreased urine volume, difficulty urinating, dyspareunia, dysuria, enuresis, flank pain, frequency, genital sores, hematuria, menstrual problem, pelvic pain, vaginal bleeding, vaginal discharge and vaginal pain.   Musculoskeletal: Positive for arthralgias and back pain. Negative for gait problem, joint swelling, myalgias, neck pain and neck stiffness.   Skin: Negative for color change, pallor, rash and wound.   Allergic/Immunologic: Negative for environmental allergies, food allergies and immunocompromised state.   Neurological: Negative for dizziness, tremors, seizures, syncope, facial asymmetry, speech difficulty, weakness, light-headedness, numbness and headaches.   Hematological: Negative for adenopathy. Does not bruise/bleed easily.   Psychiatric/Behavioral: Positive for decreased concentration and sleep disturbance. Negative for agitation, behavioral problems, confusion, dysphoric mood, hallucinations, self-injury and suicidal ideas. The patient is nervous/anxious. The patient is not hyperactive.            Objective:    Physical Exam   Pulmonary/Chest: Right breast exhibits no inverted nipple, no mass, no nipple discharge, no skin change and no tenderness. Left breast exhibits no inverted nipple, no mass, no skin change and no tenderness. Breasts are asymmetrical.   Nursing note and vitals reviewed.      Abdomen soft nontender.  Incisions clean dry and intact.  Assessment:        1. Asymmetrical breasts       Postoperative follow-up        Plan:        Aleah WRAY was seen today for post-op evaluation.    Diagnoses and all orders for this visit:    Asymmetrical breasts  -     MRI Breast Unilateral; Future

## 2017-10-31 ENCOUNTER — OFFICE VISIT (OUTPATIENT)
Dept: NEUROLOGY | Facility: CLINIC | Age: 52
End: 2017-10-31
Payer: COMMERCIAL

## 2017-10-31 VITALS
HEIGHT: 67 IN | SYSTOLIC BLOOD PRESSURE: 98 MMHG | DIASTOLIC BLOOD PRESSURE: 70 MMHG | WEIGHT: 170.63 LBS | BODY MASS INDEX: 26.78 KG/M2

## 2017-10-31 DIAGNOSIS — F33.1 MODERATE EPISODE OF RECURRENT MAJOR DEPRESSIVE DISORDER: ICD-10-CM

## 2017-10-31 DIAGNOSIS — M79.18 MYOFACIAL MUSCLE PAIN: ICD-10-CM

## 2017-10-31 DIAGNOSIS — M79.7 FIBROMYALGIA: Primary | ICD-10-CM

## 2017-10-31 PROCEDURE — 99999 PR PBB SHADOW E&M-EST. PATIENT-LVL III: CPT | Mod: PBBFAC,,, | Performed by: PSYCHIATRY & NEUROLOGY

## 2017-10-31 PROCEDURE — 99214 OFFICE O/P EST MOD 30 MIN: CPT | Mod: S$GLB,,, | Performed by: PSYCHIATRY & NEUROLOGY

## 2017-10-31 RX ORDER — NORTRIPTYLINE HYDROCHLORIDE 10 MG/1
CAPSULE ORAL
Qty: 60 CAPSULE | Refills: 11 | Status: SHIPPED | OUTPATIENT
Start: 2017-10-31 | End: 2017-11-13

## 2017-10-31 NOTE — PROGRESS NOTES
HPI: : Aleah Hagan is a 52 y.o. female with  Fibromyalgia and mild  Lumbar facet arthropathy with multiple areas of pain. Then s/p bariatric surgery with vision tracing problems (resolved)/ symptoms of light headedness with eye movement-resolved.   Since the last visit, she did not start Pamelor as prescribed. She is not sure why as she is not sleeping well and has increased fibromyalgia pain.   Flexeril was tried with only some relief.   She is interested in TPI again.  She is not on percocet- was prescribed that per GYN for hysterectomy last month  She has not been able to see her prior psychiatrist back as she missed an appointment.   Review of Systems   Constitutional: Negative for fever.   HENT: Negative for nosebleeds.    Eyes: Negative for double vision.   Respiratory: Negative for hemoptysis.    Cardiovascular: Negative for leg swelling.   Gastrointestinal: Negative for blood in stool.   Genitourinary: Negative for hematuria.   Musculoskeletal: Positive for myalgias.   Skin: Negative for rash.   Neurological: Negative for tremors.   Psychiatric/Behavioral: Negative for depression and suicidal ideas. The patient has insomnia. The patient is not nervous/anxious.        Exam:  Gen Appearance, well developed/nourished in no apparent distress  CV: 2+ distal pulses with no edema or swelling  Neuro:  MS: Awake, alert, Sustains attention..  Recent/remote memory intact, Language is full to spontaneous speech/comprehension. Fund of Knowledge is full.   Mood is euthymic  CN: Optic discs are flat with normal vasculature, PERRL, Extraoccular movements and visual fields are full. Normal facial sensation and strength, Hearing symmetric, Tongue and Palate are midline and strong. Shoulder Shrug symmetric and strong.  Motor: Normal bulk, tone, no abnormal movements. 5/5 strength bilateral upper/lower extremities with 2+ reflexes  Sensory: symmetric to  Temp,  and vibration Romberg negative  Cerebellar: Heal to shin,  Finger to nose, Rapid alternating movements intact  Gait: Normal stance, no ataxia  Multiple tender points.     Imaging 10/1/13 MRI brain:  IMPRESSION:   UNREMARKABLE MRI OF THE BRAIN SPECIFICALLY WITHOUT   EVIDENCE FOR ACUTE INFARCTION OR ENHANCING LESION.   CLINICAL CORRELATION AND FURTHER EVALUATION AS   WARRANTED.         Assessment/Plan: Aleah Hagan is a 52 y.o. female with  Fibromyalgia and mild  Lumbar facet arthropathy with multiple areas of pain.    I recommend:     1.  Refer to psychiatry for continued treatment of mood disorder.   2.  Lyrica to continue at 50mg BID (can't tolerate higher dose)  3. Restart Pamelor per orders for fibromyalgia pain and insomnia Unless sedation, mood changes or other side effects. Neuropathic cream given for fibro pain not helpful. Elavil worsened constipation.  Gabapentin/Cymbalta also tried without relief.  Had some relief with dry needling prior.   4. Flexeril can continue PRN  5. Arrange  Trigger point injection for pain     RTC in 4-6 months

## 2017-10-31 NOTE — TELEPHONE ENCOUNTER
Aleah littlejohn refill of Ambien. Last post op visit 9/17 with Dr. Deleon.   Patient Active Problem List   Diagnosis    Fibromyalgia    ANH III (cervical intraepithelial neoplasia grade III) with severe dysplasia     Prior to Admission medications    Medication Sig Start Date End Date Taking? Authorizing Provider   cyanocobalamin, vitamin B-12, 5,000 mcg TbDL Take 1 tablet by mouth once daily.    Historical Provider, MD   ibuprofen (ADVIL,MOTRIN) 800 MG tablet Take 1 tablet (800 mg total) by mouth every 8 (eight) hours. 9/12/17   Cisco Deleon MD   multivitamin (ONE DAILY MULTIVITAMIN) per tablet Take 1 tablet by mouth once daily.    Historical Provider, MD   nortriptyline (PAMELOR) 10 MG capsule Take one nightly for 3 weeks, then increase to 2 nightly 10/31/17   Anthony Maciel MD   omeprazole (PRILOSEC) 40 MG capsule Take 1 capsule (40 mg total) by mouth once daily. 6/20/17   Cisco Deleon MD   pregabalin (LYRICA) 50 MG capsule Take 1 capsule (50 mg total) by mouth 2 (two) times daily. 5/23/17 11/21/17  Anthony Maciel MD   zolpidem (AMBIEN) 5 MG Tab Take 1 tablet (5 mg total) by mouth nightly as needed. 8/30/17 2/28/18  Cisco Deleon MD   duloxetine (CYMBALTA) 30 MG capsule Take 1 capsule (30 mg total) by mouth once daily. 9/23/13 12/13/13  Anthony Maciel MD   oxycodone-acetaminophen (PERCOCET) 5-325 mg per tablet Take 1 tablet by mouth every 4 (four) hours as needed. 9/12/17 10/31/17  Cisco Deleon MD

## 2017-11-01 ENCOUNTER — HOSPITAL ENCOUNTER (OUTPATIENT)
Dept: RADIOLOGY | Facility: HOSPITAL | Age: 52
Discharge: HOME | End: 2017-11-01
Attending: OBSTETRICS & GYNECOLOGY
Payer: COMMERCIAL

## 2017-11-01 DIAGNOSIS — N64.89 ASYMMETRICAL BREASTS: ICD-10-CM

## 2017-11-01 PROCEDURE — 77059 MRI BREAST BILATERAL: CPT | Mod: 26,,, | Performed by: STUDENT IN AN ORGANIZED HEALTH CARE EDUCATION/TRAINING PROGRAM

## 2017-11-01 PROCEDURE — 77058: CPT | Mod: TC

## 2017-11-01 PROCEDURE — 0159T MRI BREAST BILATERAL: CPT | Mod: 26,,, | Performed by: STUDENT IN AN ORGANIZED HEALTH CARE EDUCATION/TRAINING PROGRAM

## 2017-11-01 PROCEDURE — 0159T MRI BREAST BILATERAL: CPT | Mod: TC

## 2017-11-02 RX ORDER — ZOLPIDEM TARTRATE 5 MG/1
TABLET ORAL
Qty: 15 TABLET | Refills: 0 | Status: SHIPPED | OUTPATIENT
Start: 2017-11-02 | End: 2017-11-13

## 2017-11-13 ENCOUNTER — OFFICE VISIT (OUTPATIENT)
Dept: PSYCHIATRY | Facility: CLINIC | Age: 52
End: 2017-11-13
Attending: PSYCHIATRY & NEUROLOGY
Payer: COMMERCIAL

## 2017-11-13 ENCOUNTER — HOSPITAL ENCOUNTER (INPATIENT)
Facility: HOSPITAL | Age: 52
LOS: 4 days | Discharge: HOME | DRG: 885 | End: 2017-11-17
Attending: PSYCHIATRY & NEUROLOGY | Admitting: PSYCHIATRY & NEUROLOGY
Payer: COMMERCIAL

## 2017-11-13 ENCOUNTER — HOSPITAL ENCOUNTER (EMERGENCY)
Facility: HOSPITAL | Age: 52
Discharge: PSYCHIATRIC OR SUBSTANCE USE FACILITY | End: 2017-11-13
Attending: SURGERY
Payer: COMMERCIAL

## 2017-11-13 VITALS
WEIGHT: 164 LBS | OXYGEN SATURATION: 99 % | BODY MASS INDEX: 25.69 KG/M2 | SYSTOLIC BLOOD PRESSURE: 140 MMHG | RESPIRATION RATE: 20 BRPM | TEMPERATURE: 98 F | HEART RATE: 88 BPM | DIASTOLIC BLOOD PRESSURE: 89 MMHG

## 2017-11-13 VITALS
SYSTOLIC BLOOD PRESSURE: 119 MMHG | HEART RATE: 88 BPM | HEIGHT: 67 IN | BODY MASS INDEX: 25.85 KG/M2 | DIASTOLIC BLOOD PRESSURE: 86 MMHG | RESPIRATION RATE: 18 BRPM | WEIGHT: 164.69 LBS

## 2017-11-13 DIAGNOSIS — M79.7 FIBROMYALGIA SYNDROME: ICD-10-CM

## 2017-11-13 DIAGNOSIS — F33.2 SEVERE EPISODE OF RECURRENT MAJOR DEPRESSIVE DISORDER, WITHOUT PSYCHOTIC FEATURES: Primary | ICD-10-CM

## 2017-11-13 DIAGNOSIS — F32.A DEPRESSION WITH SUICIDAL IDEATION: Primary | ICD-10-CM

## 2017-11-13 DIAGNOSIS — R45.851 DEPRESSION WITH SUICIDAL IDEATION: Primary | ICD-10-CM

## 2017-11-13 DIAGNOSIS — F41.1 GAD (GENERALIZED ANXIETY DISORDER): ICD-10-CM

## 2017-11-13 LAB
25(OH)D3+25(OH)D2 SERPL-MCNC: 32 NG/ML
ALBUMIN SERPL BCP-MCNC: 4.1 G/DL
ALP SERPL-CCNC: 157 U/L
ALT SERPL W/O P-5'-P-CCNC: 21 U/L
AMPHET+METHAMPHET UR QL: NEGATIVE
ANION GAP SERPL CALC-SCNC: 10 MMOL/L
APAP SERPL-MCNC: <3 UG/ML
AST SERPL-CCNC: 18 U/L
BACTERIA #/AREA URNS HPF: NORMAL /HPF
BARBITURATES UR QL SCN>200 NG/ML: NEGATIVE
BASOPHILS # BLD AUTO: 0.05 K/UL
BASOPHILS NFR BLD: 0.7 %
BENZODIAZ UR QL SCN>200 NG/ML: NEGATIVE
BILIRUB SERPL-MCNC: 0.6 MG/DL
BILIRUB UR QL STRIP: NEGATIVE
BUN SERPL-MCNC: 7 MG/DL
BZE UR QL SCN: NEGATIVE
CALCIUM SERPL-MCNC: 10.3 MG/DL
CANNABINOIDS UR QL SCN: NEGATIVE
CHLORIDE SERPL-SCNC: 103 MMOL/L
CLARITY UR: CLEAR
CO2 SERPL-SCNC: 30 MMOL/L
COLOR UR: YELLOW
CREAT SERPL-MCNC: 0.8 MG/DL
CREAT UR-MCNC: 180.5 MG/DL
DIFFERENTIAL METHOD: ABNORMAL
EOSINOPHIL # BLD AUTO: 0.2 K/UL
EOSINOPHIL NFR BLD: 2 %
ERYTHROCYTE [DISTWIDTH] IN BLOOD BY AUTOMATED COUNT: 14 %
EST. GFR  (AFRICAN AMERICAN): >60 ML/MIN/1.73 M^2
EST. GFR  (NON AFRICAN AMERICAN): >60 ML/MIN/1.73 M^2
ETHANOL SERPL-MCNC: <10 MG/DL
FOLATE SERPL-MCNC: 5.4 NG/ML
GLUCOSE SERPL-MCNC: 104 MG/DL
GLUCOSE UR QL STRIP: NEGATIVE
HCT VFR BLD AUTO: 46.6 %
HGB BLD-MCNC: 15.5 G/DL
HGB UR QL STRIP: NEGATIVE
KETONES UR QL STRIP: NEGATIVE
LEUKOCYTE ESTERASE UR QL STRIP: ABNORMAL
LYMPHOCYTES # BLD AUTO: 2.4 K/UL
LYMPHOCYTES NFR BLD: 30.9 %
MCH RBC QN AUTO: 28.6 PG
MCHC RBC AUTO-ENTMCNC: 33.3 G/DL
MCV RBC AUTO: 86 FL
METHADONE UR QL SCN>300 NG/ML: NEGATIVE
MICROSCOPIC COMMENT: NORMAL
MONOCYTES # BLD AUTO: 0.5 K/UL
MONOCYTES NFR BLD: 5.9 %
NEUTROPHILS # BLD AUTO: 4.6 K/UL
NEUTROPHILS NFR BLD: 60.5 %
NITRITE UR QL STRIP: NEGATIVE
OPIATES UR QL SCN: NEGATIVE
PCP UR QL SCN>25 NG/ML: NEGATIVE
PH UR STRIP: 7 [PH] (ref 5–8)
PLATELET # BLD AUTO: 385 K/UL
PMV BLD AUTO: 9.5 FL
POTASSIUM SERPL-SCNC: 4.1 MMOL/L
PROT SERPL-MCNC: 8.2 G/DL
PROT UR QL STRIP: NEGATIVE
RBC # BLD AUTO: 5.42 M/UL
SALICYLATES SERPL-MCNC: <5 MG/DL
SODIUM SERPL-SCNC: 143 MMOL/L
SP GR UR STRIP: 1.01 (ref 1–1.03)
T4 FREE SERPL-MCNC: 0.96 NG/DL
TOXICOLOGY INFORMATION: NORMAL
TSH SERPL DL<=0.005 MIU/L-ACNC: 0.95 UIU/ML
URN SPEC COLLECT METH UR: ABNORMAL
UROBILINOGEN UR STRIP-ACNC: ABNORMAL EU/DL
VIT B12 SERPL-MCNC: 1150 PG/ML
WBC # BLD AUTO: 7.6 K/UL
WBC #/AREA URNS HPF: 2 /HPF (ref 0–5)

## 2017-11-13 PROCEDURE — 36415 COLL VENOUS BLD VENIPUNCTURE: CPT

## 2017-11-13 PROCEDURE — 80307 DRUG TEST PRSMV CHEM ANLYZR: CPT

## 2017-11-13 PROCEDURE — 85025 COMPLETE CBC W/AUTO DIFF WBC: CPT

## 2017-11-13 PROCEDURE — 80053 COMPREHEN METABOLIC PANEL: CPT

## 2017-11-13 PROCEDURE — 84481 FREE ASSAY (FT-3): CPT

## 2017-11-13 PROCEDURE — 63600175 PHARM REV CODE 636 W HCPCS: Performed by: SURGERY

## 2017-11-13 PROCEDURE — 96372 THER/PROPH/DIAG INJ SC/IM: CPT

## 2017-11-13 PROCEDURE — 90792 PSYCH DIAG EVAL W/MED SRVCS: CPT | Mod: S$GLB,,, | Performed by: PSYCHIATRY & NEUROLOGY

## 2017-11-13 PROCEDURE — 82746 ASSAY OF FOLIC ACID SERUM: CPT

## 2017-11-13 PROCEDURE — 84439 ASSAY OF FREE THYROXINE: CPT

## 2017-11-13 PROCEDURE — 81000 URINALYSIS NONAUTO W/SCOPE: CPT

## 2017-11-13 PROCEDURE — 99999 PR PBB SHADOW E&M-EST. PATIENT-LVL III: CPT | Mod: PBBFAC,,, | Performed by: PSYCHIATRY & NEUROLOGY

## 2017-11-13 PROCEDURE — 80329 ANALGESICS NON-OPIOID 1 OR 2: CPT

## 2017-11-13 PROCEDURE — 11400000 HC PSYCH PRIVATE ROOM

## 2017-11-13 PROCEDURE — 82607 VITAMIN B-12: CPT

## 2017-11-13 PROCEDURE — 80320 DRUG SCREEN QUANTALCOHOLS: CPT

## 2017-11-13 PROCEDURE — 25000003 PHARM REV CODE 250: Performed by: PSYCHIATRY & NEUROLOGY

## 2017-11-13 PROCEDURE — 82306 VITAMIN D 25 HYDROXY: CPT

## 2017-11-13 PROCEDURE — 84443 ASSAY THYROID STIM HORMONE: CPT

## 2017-11-13 PROCEDURE — 99285 EMERGENCY DEPT VISIT HI MDM: CPT | Mod: 25

## 2017-11-13 RX ORDER — LOPERAMIDE HYDROCHLORIDE 2 MG/1
2 CAPSULE ORAL
Status: DISCONTINUED | OUTPATIENT
Start: 2017-11-13 | End: 2017-11-17 | Stop reason: HOSPADM

## 2017-11-13 RX ORDER — DIPHENHYDRAMINE HYDROCHLORIDE 50 MG/ML
50 INJECTION INTRAMUSCULAR; INTRAVENOUS EVERY 4 HOURS PRN
Status: DISCONTINUED | OUTPATIENT
Start: 2017-11-13 | End: 2017-11-13 | Stop reason: HOSPADM

## 2017-11-13 RX ORDER — HALOPERIDOL 5 MG/ML
5 INJECTION INTRAMUSCULAR EVERY 4 HOURS PRN
Status: DISCONTINUED | OUTPATIENT
Start: 2017-11-13 | End: 2017-11-13 | Stop reason: HOSPADM

## 2017-11-13 RX ORDER — IBUPROFEN 800 MG/1
800 TABLET ORAL EVERY 6 HOURS PRN
Status: DISCONTINUED | OUTPATIENT
Start: 2017-11-13 | End: 2017-11-13

## 2017-11-13 RX ORDER — FOLIC ACID 1 MG/1
1 TABLET ORAL DAILY
Status: DISCONTINUED | OUTPATIENT
Start: 2017-11-14 | End: 2017-11-17 | Stop reason: HOSPADM

## 2017-11-13 RX ORDER — ACETAMINOPHEN 325 MG/1
650 TABLET ORAL EVERY 6 HOURS PRN
Status: DISCONTINUED | OUTPATIENT
Start: 2017-11-13 | End: 2017-11-13

## 2017-11-13 RX ORDER — PANTOPRAZOLE SODIUM 40 MG/1
40 TABLET, DELAYED RELEASE ORAL DAILY
Status: DISCONTINUED | OUTPATIENT
Start: 2017-11-13 | End: 2017-11-17 | Stop reason: HOSPADM

## 2017-11-13 RX ORDER — OLANZAPINE 10 MG/2ML
10 INJECTION, POWDER, FOR SOLUTION INTRAMUSCULAR EVERY 4 HOURS PRN
Status: DISCONTINUED | OUTPATIENT
Start: 2017-11-13 | End: 2017-11-17 | Stop reason: HOSPADM

## 2017-11-13 RX ORDER — MAG HYDROX/ALUMINUM HYD/SIMETH 200-200-20
30 SUSPENSION, ORAL (FINAL DOSE FORM) ORAL EVERY 6 HOURS PRN
Status: DISCONTINUED | OUTPATIENT
Start: 2017-11-13 | End: 2017-11-17 | Stop reason: HOSPADM

## 2017-11-13 RX ORDER — OLANZAPINE 10 MG/1
10 TABLET ORAL EVERY 4 HOURS PRN
Status: DISCONTINUED | OUTPATIENT
Start: 2017-11-13 | End: 2017-11-17 | Stop reason: HOSPADM

## 2017-11-13 RX ORDER — CLONAZEPAM 0.5 MG/1
0.5 TABLET ORAL DAILY
COMMUNITY
End: 2018-01-11

## 2017-11-13 RX ORDER — ACETAMINOPHEN 325 MG/1
650 TABLET ORAL EVERY 6 HOURS PRN
Status: DISCONTINUED | OUTPATIENT
Start: 2017-11-13 | End: 2017-11-17 | Stop reason: HOSPADM

## 2017-11-13 RX ORDER — LORAZEPAM 2 MG/ML
2 INJECTION INTRAMUSCULAR EVERY 4 HOURS PRN
Status: DISCONTINUED | OUTPATIENT
Start: 2017-11-13 | End: 2017-11-13 | Stop reason: HOSPADM

## 2017-11-13 RX ORDER — IBUPROFEN 200 MG
1 TABLET ORAL DAILY PRN
Status: DISCONTINUED | OUTPATIENT
Start: 2017-11-13 | End: 2017-11-17 | Stop reason: HOSPADM

## 2017-11-13 RX ORDER — DOCUSATE SODIUM 100 MG/1
100 CAPSULE, LIQUID FILLED ORAL DAILY PRN
Status: DISCONTINUED | OUTPATIENT
Start: 2017-11-13 | End: 2017-11-17 | Stop reason: HOSPADM

## 2017-11-13 RX ORDER — DULOXETIN HYDROCHLORIDE 30 MG/1
30 CAPSULE, DELAYED RELEASE ORAL DAILY
Status: DISCONTINUED | OUTPATIENT
Start: 2017-11-14 | End: 2017-11-17 | Stop reason: HOSPADM

## 2017-11-13 RX ORDER — UBIDECARENONE 75 MG
500 CAPSULE ORAL DAILY
Status: DISCONTINUED | OUTPATIENT
Start: 2017-11-14 | End: 2017-11-17 | Stop reason: HOSPADM

## 2017-11-13 RX ORDER — CLONAZEPAM 0.5 MG/1
0.5 TABLET ORAL 2 TIMES DAILY
Status: DISCONTINUED | OUTPATIENT
Start: 2017-11-13 | End: 2017-11-14

## 2017-11-13 RX ORDER — IBUPROFEN 800 MG/1
800 TABLET ORAL EVERY 6 HOURS PRN
Status: DISCONTINUED | OUTPATIENT
Start: 2017-11-13 | End: 2017-11-17 | Stop reason: HOSPADM

## 2017-11-13 RX ORDER — HYDROXYZINE PAMOATE 50 MG/1
50 CAPSULE ORAL NIGHTLY PRN
Status: DISCONTINUED | OUTPATIENT
Start: 2017-11-13 | End: 2017-11-17 | Stop reason: HOSPADM

## 2017-11-13 RX ADMIN — PANTOPRAZOLE SODIUM 40 MG: 40 TABLET, DELAYED RELEASE ORAL at 06:11

## 2017-11-13 RX ADMIN — CLONAZEPAM 0.5 MG: 0.5 TABLET ORAL at 08:11

## 2017-11-13 RX ADMIN — LORAZEPAM 2 MG: 2 INJECTION INTRAMUSCULAR; INTRAVENOUS at 03:11

## 2017-11-13 NOTE — ED TRIAGE NOTES
52 y.o. female presents to ER   Chief Complaint   Patient presents with    Depression   pt sent to ER after visit with Dr. Gonsales, pt reports suicidal thoughts.
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
## 2017-11-13 NOTE — ED PROVIDER NOTES
[facility]                                     2017                   Chief Complaint  52 y.o. female with Depression    History of Present Illness  Aleah Hagan presents to the emergency room with depression and suicidal ideation  Pt was seen today in clinic by Dr. Ej White, who states she is a danger to herself  Patient has long-standing issues with depression, has had inpatient psychiatric care before  Patient is cooperative and not psychotic,pressure speech or paranoia on interview today  Patient admits depression with intentions to hurt herself, no specific plan on interview now    The history is provided by the patient    Past Medical History   -- Abnormal Pap smear of cervix    -- Arthritis    -- ANH II (cervical intraepithelial neoplasia II)    -- Depression    -- Disc degeneration    -- Fibromyalgia    -- Hx of psychiatric care    -- Psychiatric problem    -- Synovial cyst of lumbar spine    -- Therapy      Past Surgical History   -- APPENDECTOMY     -- BREAST SURGERY     -- CERVICAL BIOPSY  W/ LOOP ELECTRODE EXCISION     --  SECTION, CLASSIC     -- CHOLECYSTECTOMY     -- gastric sleeve     -- HYSTERECTOMY     -- NECK SURGERY     -- TONSILLECTOMY, ADENOIDECTOMY        ALLERGIES: Codeine and shrimp    Review of Systems and Physical Exam     Review of Systems  -- Constitution - no fever, denies fatigue, no weakness, no chills  -- Eyes - no tearing or redness, no visual disturbance  -- Ear, Nose - no tinnitus or earache, no nasal congestion or discharge  -- Mouth,Throat - no sore throat, no toothache, normal voice, normal swallowing  -- Respiratory - denies cough and congestion, no shortness of breath, no CALVO  -- Cardiovascular - denies chest pain, no palpitations, denies claudication  -- Gastrointestinal - denies abdominal pain, nausea, vomiting, or diarrhea  -- Genitourinary - no dysuria, no denies flank pain, no hematuria or frequency   --  Musculoskeletal - back pain, negative for myalgias and arthralgias   -- Neurological - no headache, denies weakness or seizure; no LOC  -- Skin - denies pallor, rash, or changes in skin. no hives or welts noted    Vital Signs  -- Her blood pressure is 140/89 and her pulse is 88.   -- Her respiration is 20 and oxygen saturation is 99%.      Physical Exam  -- Nursing note and vitals reviewed  -- Head: Atraumatic. Normocephalic. No obvious abnormality  -- Eyes: Pupils are equal and reactive to light. Normal conjunctiva and lids  -- Cardiac: Normal rate, regular rhythm and normal heart sounds  -- Pulmonary: Normal respiratory effort, breath sounds clear to auscultation  -- Abdominal: Soft, no tenderness. Normal bowel sounds. Normal liver edge  -- Musculoskeletal: Normal range of motion, no effusions. Joints stable   -- Neurological: No focal deficits. Showed good interaction with staff  -- Vascular: Posterior tibial, dorsalis pedis and radial pulses 2+ bilaterally      Emergency Room Course     Diagnosis  -- The encounter diagnosis was Depression with suicidal ideation.    Disposition and Plan  -- Disposition: PEC  -- Condition: stable  -- Pt will be placed in a psychiatric facility  -- The patient is a direct observation until placement  -- The patient has been made aware of his or her rights while under PEC in the ER  -- All questions have been answered; will follow ER protocols until placement    Lab work was performed in the ER, this patient is cleared for psychiatric placement     This note is dictated on Dragon Natural Speaking word recognition program.  There are word recognition mistakes that are occasionally missed on review.           Yosi Soriano MD  11/13/17 2509

## 2017-11-14 LAB
CHOLEST SERPL-MCNC: 195 MG/DL
CHOLEST/HDLC SERPL: 2.4 {RATIO}
GLUCOSE SERPL-MCNC: 77 MG/DL
HDLC SERPL-MCNC: 80 MG/DL
HDLC SERPL: 41 %
LDLC SERPL CALC-MCNC: 103.2 MG/DL
NONHDLC SERPL-MCNC: 115 MG/DL
T3FREE SERPL-MCNC: 2.6 PG/ML
TRIGL SERPL-MCNC: 59 MG/DL

## 2017-11-14 PROCEDURE — 90833 PSYTX W PT W E/M 30 MIN: CPT | Mod: ,,, | Performed by: PSYCHIATRY & NEUROLOGY

## 2017-11-14 PROCEDURE — 80061 LIPID PANEL: CPT

## 2017-11-14 PROCEDURE — 11400000 HC PSYCH PRIVATE ROOM

## 2017-11-14 PROCEDURE — 25000003 PHARM REV CODE 250: Performed by: PSYCHIATRY & NEUROLOGY

## 2017-11-14 PROCEDURE — 82947 ASSAY GLUCOSE BLOOD QUANT: CPT

## 2017-11-14 PROCEDURE — 99254 IP/OBS CNSLTJ NEW/EST MOD 60: CPT | Mod: ,,, | Performed by: INTERNAL MEDICINE

## 2017-11-14 PROCEDURE — 99223 1ST HOSP IP/OBS HIGH 75: CPT | Mod: AI,,, | Performed by: PSYCHIATRY & NEUROLOGY

## 2017-11-14 PROCEDURE — 36415 COLL VENOUS BLD VENIPUNCTURE: CPT

## 2017-11-14 RX ORDER — PREGABALIN 50 MG/1
50 CAPSULE ORAL 2 TIMES DAILY
Status: DISCONTINUED | OUTPATIENT
Start: 2017-11-14 | End: 2017-11-17 | Stop reason: HOSPADM

## 2017-11-14 RX ORDER — CLONAZEPAM 0.25 MG/1
0.25 TABLET, ORALLY DISINTEGRATING ORAL DAILY
Status: DISCONTINUED | OUTPATIENT
Start: 2017-11-14 | End: 2017-11-17 | Stop reason: HOSPADM

## 2017-11-14 RX ORDER — CLONAZEPAM 0.5 MG/1
0.5 TABLET ORAL NIGHTLY
Status: DISCONTINUED | OUTPATIENT
Start: 2017-11-14 | End: 2017-11-17 | Stop reason: HOSPADM

## 2017-11-14 RX ADMIN — CLONAZEPAM 0.25 MG: 0.25 TABLET, ORALLY DISINTEGRATING ORAL at 12:11

## 2017-11-14 RX ADMIN — CLONAZEPAM 0.5 MG: 0.5 TABLET ORAL at 08:11

## 2017-11-14 RX ADMIN — PREGABALIN 50 MG: 50 CAPSULE ORAL at 08:11

## 2017-11-14 RX ADMIN — Medication 500 MCG: at 08:11

## 2017-11-14 RX ADMIN — DULOXETINE 30 MG: 30 CAPSULE, DELAYED RELEASE ORAL at 08:11

## 2017-11-14 RX ADMIN — PANTOPRAZOLE SODIUM 40 MG: 40 TABLET, DELAYED RELEASE ORAL at 08:11

## 2017-11-14 RX ADMIN — PREGABALIN 50 MG: 50 CAPSULE ORAL at 12:11

## 2017-11-14 RX ADMIN — IBUPROFEN 800 MG: 800 TABLET ORAL at 08:11

## 2017-11-14 RX ADMIN — THERA TABS 1 TABLET: TAB at 08:11

## 2017-11-14 RX ADMIN — FOLIC ACID 1 MG: 1 TABLET ORAL at 08:11

## 2017-11-14 NOTE — PLAN OF CARE
Problem: Patient Care Overview (Adult)  Goal: Plan of Care Review  Outcome: Ongoing (interventions implemented as appropriate)  Plan of care reviewed with patient, patient agrees with plan.  Denies suicidal ideations and homicidal ideations. Gait steady, no falls.  Accepts meals, snacks, and compliant with medications.  Clutter-free environment and clear pathways maintained.  Promoted an individualized safety plan, effective coping strategies, smoking cessation, and motivators to improve mood and resolve depression.  Will continue to monitor for safety.

## 2017-11-14 NOTE — PLAN OF CARE
Problem: Patient Care Overview (Adult)  Goal: Plan of Care Review  Outcome: Ongoing (interventions implemented as appropriate)  Lying quiet in bed, eyes closed and respiration even and unlabored, appeared asleep.  Slept well all shift, achieving 9.5 hours of sleep so far.  Safety precautions maintained, bed low & pathway kept clear.

## 2017-11-14 NOTE — PLAN OF CARE
Patient arrived to [facility] per wheelchair, escorted by mental health tech and .  Patient is calm, but tearful and with poor eye contact. Denies suicidal ideations and homicidal ideations. Denies audio hallucinations and visual hallucinations.  Assessed for contraband, none found.  Armband placed on left wrist.  Skin assessment performed, noted some surgical scars to abdomen.  Admit assessment and plan of care as follows:  Reports history of cholecystectomy, hysterectomy, and gastric sleeve in 2012. Has previously seen Francy Garvey NP in the past for depression.  Currently is experiencing a legal separation and upcoming divorce from her  who has been unfaithful for the past few years. Has attended counseling with , seen by Dr. Rocky Hodges in the past. Followed by neurologist for fibromyalgia and arthritis of the spine; was ordered to take Lyrica, Ambien, Klonopin and Nortriptyline. However, never got prescriptions filled because she could not afford them.  Also, has had HPV and gardinella infections.  Has a son and a daughter who help her a lot.  Smokes 20 cigarettes daily, encouraged to quit.  Completed high school, is a [employer] and a [employer].  No record of incarceration or arrest. Patient states she used to be happy, organized, neat and pretty; but now sees herself as sad, used, and ugly and she does not like what she has become.    Oriented patient to unit, staff, rules, rights, schedules, responsibilities.  Contracted for safety.  Clutter-free environment and clear pathways maintained.  Will continue to monitor for safety.

## 2017-11-14 NOTE — CONSULTS
[facility] Medicine  Consult Note    Patient Name: Aleah Hagan  MRN: [id number]  Admission Date: 2017  Hospital Length of Stay: 1 days  Attending Physician: Ej White MD   Primary Care Provider: Binu Burnett MD           Patient information was obtained from patient and ER records.     Consults  Subjective:     Principal Problem: Severe episode of recurrent major depressive disorder, without psychotic features    Chief Complaint: No chief complaint on file.       HPI: Patient admitted to [facility] with depression and SI. She has h/o fibromyalgia prescribed lyrica 50mg BID and nortriptyline but she never started taking. Also reports chronic low back pain. No other chronic medical issues. Labs reviewed, normal. Denies chest pains, SOB, abd pain, n/v/d/c.     Past Medical History:   Diagnosis Date    Abnormal Pap smear of cervix 2015    leep done- CIN1    Arthritis     ANH II (cervical intraepithelial neoplasia II)     Depression     Disc degeneration     ruptured    Fibromyalgia     Hx of psychiatric care     Psychiatric problem     Synovial cyst of lumbar spine     Therapy        Past Surgical History:   Procedure Laterality Date    APPENDECTOMY      age 22    BREAST SURGERY  2014    augmentation    CERVICAL BIOPSY  W/ LOOP ELECTRODE EXCISION  17    ANH 1     SECTION, CLASSIC      x2    CHOLECYSTECTOMY  10/2013    gastric sleeve  13    HYSTERECTOMY  2017    [facility]- cervical dysp    NECK SURGERY      cosmetic surgery    TONSILLECTOMY, ADENOIDECTOMY  age 9       Review of patient's allergies indicates:   Allergen Reactions    Codeine Hives    Shrimp Hives       Current Facility-Administered Medications on File Prior to Encounter   Medication    [DISCONTINUED] diphenhydrAMINE injection 50 mg    [DISCONTINUED] haloperidol lactate injection 5 mg    [DISCONTINUED] lorazepam injection 2 mg     Current Outpatient  Prescriptions on File Prior to Encounter   Medication Sig    clonazePAM (KLONOPIN) 0.5 MG tablet Take 0.5 mg by mouth 2 (two) times daily as needed for Anxiety.    cyanocobalamin, vitamin B-12, 5,000 mcg TbDL Take 1 tablet by mouth once daily.    ibuprofen (ADVIL,MOTRIN) 800 MG tablet Take 1 tablet (800 mg total) by mouth every 8 (eight) hours.    [DISCONTINUED] duloxetine (CYMBALTA) 30 MG capsule Take 1 capsule (30 mg total) by mouth once daily.     Family History     Problem Relation (Age of Onset)    Cancer Mother, Father, Sister        Social History Main Topics    Smoking status: Current Every Day Smoker     Packs/day: 0.30     Years: 10.00     Types: Cigarettes     Start date: 9/6/1984    Smokeless tobacco: Never Used      Comment: stopped for 23 years and then started 2 years ago again     Alcohol use Yes      Comment: Socially-2 times a month on the weekend-3 drinks each day     Drug use: No    Sexual activity: Not Currently     Partners: Male     Birth control/ protection: None, Post-menopausal, See Surgical Hx      Comment:      Review of Systems   Constitutional: Negative for activity change, fatigue, fever and unexpected weight change.   HENT: Negative for congestion, ear pain, hearing loss, rhinorrhea and sore throat.    Eyes: Negative for pain, redness and visual disturbance.   Respiratory: Negative for cough, shortness of breath and wheezing.    Cardiovascular: Negative for chest pain, palpitations and leg swelling.   Gastrointestinal: Negative for abdominal pain, constipation, diarrhea, nausea and vomiting.   Genitourinary: Negative for dysuria, frequency and urgency.   Musculoskeletal: Positive for back pain (chronic). Negative for joint swelling and neck pain.   Skin: Negative for color change, rash and wound.   Neurological: Negative for dizziness, tremors, weakness, light-headedness and headaches.     Objective:     Vital Signs (Most Recent):  Temp: 97.5 °F (36.4 °C) (11/14/17  0800)  Pulse: 77 (11/14/17 0800)  Resp: 18 (11/14/17 0800)  BP: 106/87 (11/14/17 0800) Vital Signs (24h Range):  Temp:  [96.6 °F (35.9 °C)-98 °F (36.7 °C)] 97.5 °F (36.4 °C)  Pulse:  [77-88] 77  Resp:  [18-20] 18  SpO2:  [99 %] 99 %  BP: (101-140)/(56-89) 106/87     Weight: 73 kg (161 lb)  Body mass index is 25.22 kg/m².    Physical Exam   Constitutional: She is oriented to person, place, and time. She appears well-developed and well-nourished. No distress.   HENT:   Head: Normocephalic and atraumatic.   Right Ear: External ear normal.   Left Ear: External ear normal.   Mouth/Throat: Oropharynx is clear and moist.   Eyes: Conjunctivae and EOM are normal. Pupils are equal, round, and reactive to light. Right eye exhibits no discharge. Left eye exhibits no discharge.   Neck: Neck supple. No tracheal deviation present.   Cardiovascular: Normal rate and regular rhythm.  Exam reveals no gallop and no friction rub.    No murmur heard.  Pulmonary/Chest: Effort normal and breath sounds normal. No respiratory distress. She has no wheezes. She has no rales.   Abdominal: Soft. Bowel sounds are normal. She exhibits no distension. There is no tenderness.   Neurological: She is alert and oriented to person, place, and time. No cranial nerve deficit.     Neuro: Cranial nerves:  CN II Visual fields full to confrontation.   CN III, IV, VI Pupils are equal, round, and reactive to light.  CN III: no palsy  Nystagmus: none   Diplopia: none  Ophthalmoparesis: none  CN V Facial sensation intact.   CN VII Facial expression full, symmetric.   CN VIII normal.   CN IX normal.   CN X normal.   CN XI normal.   CN XII normal.         Skin: Skin is warm and dry.   Psychiatric: She has a normal mood and affect. Her behavior is normal.   Nursing note and vitals reviewed.      Significant Labs:   CBC:   Recent Labs  Lab 11/13/17  1540   WBC 7.60   HGB 15.5   HCT 46.6   *     CMP:   Recent Labs  Lab 11/13/17  1540      K 4.1       CO2 30*      BUN 7   CREATININE 0.8   CALCIUM 10.3   PROT 8.2   ALBUMIN 4.1   BILITOT 0.6   ALKPHOS 157*   AST 18   ALT 21   ANIONGAP 10   EGFRNONAA >60     All pertinent labs within the past 24 hours have been reviewed.    Significant Imaging: I have reviewed all pertinent imaging results/findings within the past 24 hours.    Assessment/Plan:     * Severe episode of recurrent major depressive disorder, without psychotic features    Further orders per psych          Fibromyalgia    If OK with psych, can add back her lyrica 50mg BID  She never started nortriptyline per her report but may be helpful is psych agrees  Iburpofen PRN for pain ordered as well            VTE Risk Mitigation     None              Thank you for your consult. I will sign off. Please contact us if you have any additional questions.    Flavia Cabral MD  Department of Hospital Medicine   [facility]

## 2017-11-14 NOTE — ASSESSMENT & PLAN NOTE
If OK with psych, can add back her lyrica 50mg BID  She never started nortriptyline per her report but may be helpful is psych agrees  Iburpofen PRN for pain ordered as well

## 2017-11-14 NOTE — PLAN OF CARE
Problem: Overarching Goals (Adult)  Goal: Develops/Participates in Therapeutic [place] to Support Successful Transition    Intervention: [facility] Therapeutic [place]  Group Note    Behavior:  Patient attended group and participated in the discussion.     Intervention:  Managing Expectations - discussed what expectations are, who they come from and why and how these expectations afect us. Participants shared expectations they have of themselves and others and how they can better manage them. Discussed prioritizing, finding balance and self care.     Response:      Plan:  Will continue to encourage patient participation in group. Will meet 1:1 with patient.
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
## 2017-11-14 NOTE — HPI
Patient admitted [facility] with depression and SI. She has h/o fibromyalgia prescribed lyrica 50mg BID and nortriptyline but she never started taking. Also reports chronic low back pain. No other chronic medical issues. Labs reviewed, normal. Denies chest pains, SOB, abd pain, n/v/d/c.

## 2017-11-14 NOTE — SUBJECTIVE & OBJECTIVE
Past Medical History:   Diagnosis Date    Abnormal Pap smear of cervix 2015    leep done- CIN1    Arthritis     ANH II (cervical intraepithelial neoplasia II)     Depression     Disc degeneration     ruptured    Fibromyalgia     Hx of psychiatric care     Psychiatric problem     Synovial cyst of lumbar spine     Therapy        Past Surgical History:   Procedure Laterality Date    APPENDECTOMY  1988    age 22    BREAST SURGERY  2014    augmentation    CERVICAL BIOPSY  W/ LOOP ELECTRODE EXCISION  17    ANH 1     SECTION, CLASSIC      x2    CHOLECYSTECTOMY  10/2013    gastric sleeve  14    HYSTERECTOMY  2017    [facility]- cervical dysp    NECK SURGERY      cosmetic surgery    TONSILLECTOMY, ADENOIDECTOMY  age 9       Review of patient's allergies indicates:   Allergen Reactions    Codeine Hives    Shrimp Hives       Current Facility-Administered Medications on File Prior to Encounter   Medication    [DISCONTINUED] diphenhydrAMINE injection 50 mg    [DISCONTINUED] haloperidol lactate injection 5 mg    [DISCONTINUED] lorazepam injection 2 mg     Current Outpatient Prescriptions on File Prior to Encounter   Medication Sig    clonazePAM (KLONOPIN) 0.5 MG tablet Take 0.5 mg by mouth 2 (two) times daily as needed for Anxiety.    cyanocobalamin, vitamin B-12, 5,000 mcg TbDL Take 1 tablet by mouth once daily.    ibuprofen (ADVIL,MOTRIN) 800 MG tablet Take 1 tablet (800 mg total) by mouth every 8 (eight) hours.    [DISCONTINUED] duloxetine (CYMBALTA) 30 MG capsule Take 1 capsule (30 mg total) by mouth once daily.     Family History     Problem Relation (Age of Onset)    Cancer Mother, Father, Sister        Social History Main Topics    Smoking status: Current Every Day Smoker     Packs/day: 0.30     Years: 10.00     Types: Cigarettes     Start date: 1984    Smokeless tobacco: Never Used      Comment: stopped for 23 years and then started 2 years ago again      Alcohol use Yes      Comment: Socially-2 times a month on the weekend-3 drinks each day     Drug use: No    Sexual activity: Not Currently     Partners: Male     Birth control/ protection: None, Post-menopausal, See Surgical Hx      Comment:      Review of Systems   Constitutional: Negative for activity change, fatigue, fever and unexpected weight change.   HENT: Negative for congestion, ear pain, hearing loss, rhinorrhea and sore throat.    Eyes: Negative for pain, redness and visual disturbance.   Respiratory: Negative for cough, shortness of breath and wheezing.    Cardiovascular: Negative for chest pain, palpitations and leg swelling.   Gastrointestinal: Negative for abdominal pain, constipation, diarrhea, nausea and vomiting.   Genitourinary: Negative for dysuria, frequency and urgency.   Musculoskeletal: Positive for back pain (chronic). Negative for joint swelling and neck pain.   Skin: Negative for color change, rash and wound.   Neurological: Negative for dizziness, tremors, weakness, light-headedness and headaches.     Objective:     Vital Signs (Most Recent):  Temp: 97.5 °F (36.4 °C) (11/14/17 0800)  Pulse: 77 (11/14/17 0800)  Resp: 18 (11/14/17 0800)  BP: 106/87 (11/14/17 0800) Vital Signs (24h Range):  Temp:  [96.6 °F (35.9 °C)-98 °F (36.7 °C)] 97.5 °F (36.4 °C)  Pulse:  [77-88] 77  Resp:  [18-20] 18  SpO2:  [99 %] 99 %  BP: (101-140)/(56-89) 106/87     Weight: 73 kg (161 lb)  Body mass index is 25.22 kg/m².    Physical Exam   Constitutional: She is oriented to person, place, and time. She appears well-developed and well-nourished. No distress.   HENT:   Head: Normocephalic and atraumatic.   Right Ear: External ear normal.   Left Ear: External ear normal.   Mouth/Throat: Oropharynx is clear and moist.   Eyes: Conjunctivae and EOM are normal. Pupils are equal, round, and reactive to light. Right eye exhibits no discharge. Left eye exhibits no discharge.   Neck: Neck supple. No tracheal deviation  present.   Cardiovascular: Normal rate and regular rhythm.  Exam reveals no gallop and no friction rub.    No murmur heard.  Pulmonary/Chest: Effort normal and breath sounds normal. No respiratory distress. She has no wheezes. She has no rales.   Abdominal: Soft. Bowel sounds are normal. She exhibits no distension. There is no tenderness.   Neurological: She is alert and oriented to person, place, and time. No cranial nerve deficit.     Neuro: Cranial nerves:  CN II Visual fields full to confrontation.   CN III, IV, VI Pupils are equal, round, and reactive to light.  CN III: no palsy  Nystagmus: none   Diplopia: none  Ophthalmoparesis: none  CN V Facial sensation intact.   CN VII Facial expression full, symmetric.   CN VIII normal.   CN IX normal.   CN X normal.   CN XI normal.   CN XII normal.         Skin: Skin is warm and dry.   Psychiatric: She has a normal mood and affect. Her behavior is normal.   Nursing note and vitals reviewed.      Significant Labs:   CBC:   Recent Labs  Lab 11/13/17  1540   WBC 7.60   HGB 15.5   HCT 46.6   *     CMP:   Recent Labs  Lab 11/13/17  1540      K 4.1      CO2 30*      BUN 7   CREATININE 0.8   CALCIUM 10.3   PROT 8.2   ALBUMIN 4.1   BILITOT 0.6   ALKPHOS 157*   AST 18   ALT 21   ANIONGAP 10   EGFRNONAA >60     All pertinent labs within the past 24 hours have been reviewed.    Significant Imaging: I have reviewed all pertinent imaging results/findings within the past 24 hours.

## 2017-11-15 PROCEDURE — 90833 PSYTX W PT W E/M 30 MIN: CPT | Mod: ,,, | Performed by: PSYCHIATRY & NEUROLOGY

## 2017-11-15 PROCEDURE — 99233 SBSQ HOSP IP/OBS HIGH 50: CPT | Mod: ,,, | Performed by: PSYCHIATRY & NEUROLOGY

## 2017-11-15 PROCEDURE — 11400000 HC PSYCH PRIVATE ROOM

## 2017-11-15 PROCEDURE — 25000003 PHARM REV CODE 250: Performed by: PSYCHIATRY & NEUROLOGY

## 2017-11-15 RX ADMIN — CLONAZEPAM 0.25 MG: 0.25 TABLET, ORALLY DISINTEGRATING ORAL at 08:11

## 2017-11-15 RX ADMIN — PREGABALIN 50 MG: 50 CAPSULE ORAL at 08:11

## 2017-11-15 RX ADMIN — FOLIC ACID 1 MG: 1 TABLET ORAL at 08:11

## 2017-11-15 RX ADMIN — DULOXETINE 30 MG: 30 CAPSULE, DELAYED RELEASE ORAL at 08:11

## 2017-11-15 RX ADMIN — CLONAZEPAM 0.5 MG: 0.5 TABLET ORAL at 08:11

## 2017-11-15 RX ADMIN — ALUMINUM HYDROXIDE, MAGNESIUM HYDROXIDE, AND SIMETHICONE 30 ML: 200; 200; 20 SUSPENSION ORAL at 02:11

## 2017-11-15 RX ADMIN — HYDROXYZINE PAMOATE 50 MG: 50 CAPSULE ORAL at 03:11

## 2017-11-15 RX ADMIN — THERA TABS 1 TABLET: TAB at 08:11

## 2017-11-15 RX ADMIN — Medication 500 MCG: at 08:11

## 2017-11-15 RX ADMIN — PANTOPRAZOLE SODIUM 40 MG: 40 TABLET, DELAYED RELEASE ORAL at 08:11

## 2017-11-15 NOTE — PLAN OF CARE
"Problem: Overarching Goals (Adult)  Goal: Develops/Participates in Therapeutic [place] to Support Successful Transition    Intervention: Mutually Develop Transition Plan  Collateral Contact:   Patient signed release of information for her son. Spoke with patient's son Rambo [phone]. He states he does not think she is suicidal or would harm herself. "No nohting like that. Every once in a while she just gets down like everybody else does. But nothing like that." He states he will be on his way to [place] and wont be back til Tuesday. His sister is currently staying at the house with her  so patient will not be home alone. He does not have patient id. Will have mother call him.        "

## 2017-11-15 NOTE — PLAN OF CARE
"  Treatment Recommendation:   1:1 Intervention (as needed)    Cognitive Stimulation Skilled Activity  Creative Expression Skilled Activity  Self Expression Skilled Activity  Mild Exercises Skilled Activity  Stress Management Skilled Activity  Coping Skilled Activity  Leisure Education and Awareness Skilled Activity    Treatment Goal(s):  Long Term Goals Refer To Master Treatment Plan    Short Term Treatment Goal(s)  Patient Will:  Exhibit Improvement in Mood  Demonstrate Constructive Expression of Feelings and Behavior  Identify at Least 2 Coping Skills or Leisure Skills to Reduce Depression and Hopelessness Upon Request from Therapist    Discharge Recommendations:  Encourage Patient to Utilize Coping Skills on a Regular Basis to Reduce the Risk of Decompensating and Re-Hospitalizations  Follow Up with After Care Appointments  Continue with Current Leisure Activities     Patient presents with depressed affect and "tired, anxious" mood. Patient states her admit is due to depression, suicidal thoughts, marital problems. Patient states "I feel cloudy now. I can't focus." Patient admits to negative leisure lifestyle of drinking occasionally and cigarettes. Patient reports she is  but  from her , has high school education, wears glasses, hard of hearing with background noise(read lips most of the time), unemployed, has financial stressors, lives alone in [place]. Patient verbalized main goal "to feel better."  "

## 2017-11-15 NOTE — PLAN OF CARE
Problem: Patient Care Overview (Adult)  Goal: Plan of Care Review  Outcome: Ongoing (interventions implemented as appropriate)  Pt is sleeping at this time and has slept 5 hours with two awakenings complaining of stomach ache.  Thought it was heartburn.  Maalox given per order.  Later woke up complaining again, thinking it might be her anxiety causing stomach ache.  Vistaril given po per order.  NAD.  Resp even & unlabored.  Pathways clear and bed in low position.  Q 15 minute safety checks ongoing.  All precautions maintained

## 2017-11-15 NOTE — PROGRESS NOTES
"PSYCHIATRY DAILY INPATIENT PROGRESS NOTE  SUBSEQUENT HOSPITAL VISIT    ENCOUNTER DATE: 11/15/2017  SITE: [facility]    DATE OF ADMISSION: 11/13/2017  5:04 PM  LENGTH OF STAY: 2 days      HISTORY    CHIEF COMPLAINT   Aleah Hagan is a 52 y.o. female, seen during daily smith rounds on the inpatient unit.  Aleah Hagan presents with the chief complaint of suicidal ideation    HPI   (Elements: Location, Quality, Severity, Duration, Timing, Content, Modifying Factors, Associated Signs & Symptoms)    The patient was seen and examined. The chart was reviewed.    Staff reports no behavioral or management issues.     The patient has been compliant with treatment. The patient denied any side effects.    Patient continues to be depressed.  She has responded well to therapy.  In two years she would like to have good relationships with her children and grandchildren.  She is also thinking about remodeling her house.  She has specific details like adding a back patio and car ports.  She explains she likes to spend time outside.        Continued but improving Symptoms of Depression: less diminished mood or less loss of interest/anhedonia; irritability, diminished energy, change in sleep, +change in appetite, +diminished concentration or cognition or indecisiveness, PMA/R, +excessive guilt or hopelessness or worthlessness, less suicidal ideations     Endorses Changes in Sleep: less trouble with initiation, +maintenance, +early morning awakening with inability to return to sleep, hypersomnolence     She slept six hours     She suffers from Fibromyalgia "I hurt constantly 24/7"     Less improving Suicidal/Homicidal ideations: less active/passive ideations, organized plans, future intentions     Denies Symptoms of psychosis: hallucinations, delusions, disorganized thinking, disorganized behavior or abnormal motor behavior, or negative symptoms (diminshed emotional expression, avolition, anhedonia, alogia, asociality "      Denies Symptoms of soraya or hypomania: elevated, expansive, or irritable mood with increased energy or activity; with inflated self-esteem or grandiosity, decreased need for sleep, increased rate of speech, FOI or racing thoughts, distractibility, increased goal directed activity or PMA, risky/disinhibited behavior     Continued Symptoms of ALFREDA: less excessive anxiety/worry/fear, +more days than not, +about numerous issues, +difficult to control, +with restlessness, +fatigue, +poor concentration, +irritability, +muscle tension, +sleep disturbance; +causes functionally impairing distress     Has had some symptoms Symptoms of Panic Disorder: recurrent panic attacks (palpitations/heart racing, sweating, shakiness, dyspnea, choking, chest pain/discomfort, Gi symptoms, dizzy/lightheadedness, hot/col flashes, paresthesias, derealization, fear of losing control or fear of dying), precipitated or un-precipitated, source of worry and/or behavioral changes secondary, with or without agoraphobia     No apparent panic attacks while on the unit    PSYCHOTHERAPY ADD-ON +[id number]   30 (16-37*) minutes    Time: 18 minutes  Participants: Met with patient    Therapeutic Intervention Type: behavior modifying psychotherapy, supportive psychotherapy  Why chosen therapy is appropriate versus another modality: relevant to diagnosis, patient responds to this modality, evidence based practice    Target symptoms: depression, safety plan  Primary focus: safety plan, coping skills  Psychotherapeutic techniques: coping skills, supportive psychotherapy    Outcome monitoring methods: self-report, observation    Patient's response to intervention:  The patient's response to intervention is accepting.    Progress toward goals:  The patient's progress toward goals is fair .    ROS  General ROS: negative  Ophthalmic ROS: negative  ENT ROS: negative  Allergy and Immunology ROS: negative  Hematological and Lymphatic ROS: negative  Endocrine ROS:  "negative  Respiratory ROS: no cough, shortness of breath, or wheezing  Cardiovascular ROS: no chest pain or dyspnea on exertion  Gastrointestinal ROS: no abdominal pain, change in bowel habits, or black or bloody stools  Genito-Urinary ROS: no dysuria, trouble voiding, or hematuria  Musculoskeletal ROS: negative  Neurological ROS: no TIA or stroke symptoms  Dermatological ROS: negative    PAST MEDICAL HISTORY   Past Medical History:   Diagnosis Date    Abnormal Pap smear of cervix 11/30/2015    leep done- [facility]    Arthritis     ANH II (cervical intraepithelial neoplasia II)     Depression     Disc degeneration     ruptured    Fibromyalgia     Hx of psychiatric care     Psychiatric problem     Synovial cyst of lumbar spine     Therapy            PSYCHOTROPIC MEDICATIONS   Scheduled Meds:   clonazePAM  0.25 mg Oral Daily    clonazePAM  0.5 mg Oral QHS    cyanocobalamin  500 mcg Oral Daily    DULoxetine  30 mg Oral Daily    folic acid  1 mg Oral Daily    multivitamin  1 tablet Oral Daily    pantoprazole  40 mg Oral Daily    pregabalin  50 mg Oral BID     Continuous Infusions:   PRN Meds:.acetaminophen, aluminum-magnesium hydroxide-simethicone, docusate sodium, hydrOXYzine pamoate, ibuprofen, loperamide, nicotine, OLANZapine **AND** OLANZapine        EXAMINATION    VITALS   Vitals:    11/15/17 0800   BP: 117/64   Pulse: 67   Resp: 18   Temp: 97 °F (36.1 °C)       CONSTITUTIONAL  General Appearance: ; NAD     MUSCULOSKELETAL  Muscle Strength and Tone:  normal  Abnormal Involuntary Movements:  none  Gait and Station:  normal; non-ataxic   PSYCHIATRIC   Level of Consciousness: awake, alert  Orientation: p/p/t/s  Grooming: in hospital gown adequate to circumstances  Psychomotor Behavior:  PMA/+R  Speech: nl r/t/v/s  Language: able to repeat words English fluent  Mood: "depressed"  Affect: mood congruent not tearful  Thought Process:  linear and organized  Associations:  intact; no BALDEV  Thought Content: " improving SI denied AVH/delusions; denied HI  Memory:  intact to recent and remote events  Attention:  intact to conversation; not distractible   Fund of Knowledge:  age and education appropriate  Estimate if Intelligence:  average based on work/education history, vocabulary and mental status exam  Insight:  good- seeks help  Judgment:   good- no bx issues, compliant and cooperative      DIAGNOSTIC TESTING   Laboratory Results  No results found for this or any previous visit (from the past 24 hour(s)).      MEDICAL DECISION MAKING    DIAGNOSES  Major Depressive Disorder Recurrent Severe without psychosis  Generalized Anxiety Disorder  Significant marital / psychosocial stressors  Fibromyalgia  GERD    PROBLEM LIST AND MANAGEMENT PLANS    Depression - counseling and cymbalta  Anxiety - counseling and cymbalta, clonazepam  Fibromyalgia - cymbalta, lyrica and counseling  Psychosocial stressor - consider family meeting, counseling  Reflux - Pantoprazole    PRESCRIPTION DRUG MANAGEMENT  Compliance: yes  Side Effects: no  Regimen Adjustments:      11/14  Clonazepam 0.25mg QAM and 0.5mg QHS  Cymbalta 30mg QDay  Pantoprazole 40mg QDay  Lyrica 50mg BID      DISCHARGE PLANNING  -SW to assist with aftercare planning and collateral  -Once stable discharge home with outpatient follow up care and/or rehab  -Continue inpatient treatment under a [facility] and/or [facility] for danger to self as evident by voiced suicidal ideation in the setting of severe depression      NEED FOR CONTINUED HOSPITALIZATION  Psychiatric illness continues to pose a potential threat to life or bodily function, of self or others, thereby requiring the need for continued inpatient psychiatric hospitalization: Yes    Protective inpatient pyschiatric hospitalization required while a safe disposition plan is enacted: Yes    Patient stabilized and ready for discharge from inpatient psychiatric unit: No      STAFF:   Ej White MD  Psychiatry

## 2017-11-15 NOTE — PLAN OF CARE
Problem: Patient Care Overview (Adult)  Goal: Plan of Care Review  Outcome: Ongoing (interventions implemented as appropriate)  Plan of care reviewed with patient, patient agrees with plan.  Denies suicidal ideations and homicidal ideations, accepts meals and snacks, compliant with medications.  Gait steady, no falls. Clutter-free environment and clear pathways maintained. Promoted an individualized safety plan, effective coping strategies, and motivators to improve mood and resolve depression. Will continue to monitor for safety.

## 2017-11-15 NOTE — PLAN OF CARE
"Problem: Overarching Goals (Adult)  Goal: Develops/Participates in Therapeutic [place] to Support Successful Transition    Intervention: Foster Therapeutic [place]      Chief Complaint:    Patient states she has been going through a lot and has suicidal thoughts but would not act on them. Patient  her  who cheated on her. States it's hard to feel worthy or trusting enough.  Patient has been  since she was 18yo. States she and  had split but they got back together. However she found out he had never told the other woman they were back together. Patient also claims she has phone messages and video. "I have all his pictures. When I try to talk to him he runs away.    Patient has multiple somatic complaints. States she has back pain, Fibromyalgia, a ruptured disk, arthritis in her spine and had a hysterectomy in September, after two other painful procedures because her  gave her HPV.     Patient states she cannot focus because of the pain, can't finish tasks. I'll start and put it aside.   Patient state gave her daughter a baby shower and tried to do everything herself, but realized she couldn't. She called her sisters and a few friends and told them what to do, but it wasn't as good as if she could have walked around and fixed everything. Reminded patient about managing expectations. Patient states she and her  were seeing a marriage counselor, who her  lied to.   States her nerves been shot since last October. She had quit smoking for 23 years and started again.    Patient states she is ashamed and "was thinking I didn't want to be here anymore. Everyone thinks i'm strong  And I have everything together."  Patient states she's been scrambled and having problems focusing for the past five years, after the first break up, and her mom  right after.    Patient states she has missed her 's appointment, and dentist appointment and still has a rental car. Patient " "has not spoken with her family. Encouraged her to contact her daughter to see if she handled the appointment changes and rental car.         Pt Age/Gender/Appearance/Psych History/Symptoms and Duration:  Patient is a 53yo female admitted with depression, SI .       Suicidal Ideations/Plan/Attempt History/Risk and Protective Factors:  I have felt suicidal, but never acted on it" Patient states she has no plan. I would just think if I  did something I would have to do somethng that wouldnt hurt other people.    I have a grandbaby, my daughter's trying to buy house, I have a  parrot, 2 dogs that depend on me that I need to be home taking care of. My son may get  and have kids too.  I want to be around to see all that.   Patient notes when she was 14, her boyfriend killed himself and she was devastated. We had no answers, no closure. I wouldn't want to do that to my family."     Substance Abuse History/UTOX:  Patient had a negative UTOX     Sleep/Appetite Quality:  Patient states she can't lie down long because her body hurts. States she wasn't sleeping well at home and was scared to take meds but since she's been here taking them, she thinks she can do it.      Compliance/Legal History/Issues:  None     Abuse Concerns:  None     Cultural/Adventism Values/Beliefs:  Grew up [employer], switched to Zoroastrian. I belong to any Adventism I feel like going to at the time.       Supports/Marital Status/Quality of Interpersonal Relationships:  Adult children, son 24yo and daugher 20yo        Initial Discharge Plan:  D/C to home  ROBSON White                                  "

## 2017-11-16 PROCEDURE — 25000003 PHARM REV CODE 250: Performed by: PSYCHIATRY & NEUROLOGY

## 2017-11-16 PROCEDURE — 11400000 HC PSYCH PRIVATE ROOM

## 2017-11-16 PROCEDURE — 99233 SBSQ HOSP IP/OBS HIGH 50: CPT | Mod: ,,, | Performed by: PSYCHIATRY & NEUROLOGY

## 2017-11-16 RX ADMIN — PANTOPRAZOLE SODIUM 40 MG: 40 TABLET, DELAYED RELEASE ORAL at 08:11

## 2017-11-16 RX ADMIN — FOLIC ACID 1 MG: 1 TABLET ORAL at 08:11

## 2017-11-16 RX ADMIN — ALUMINUM HYDROXIDE, MAGNESIUM HYDROXIDE, AND SIMETHICONE 30 ML: 200; 200; 20 SUSPENSION ORAL at 09:11

## 2017-11-16 RX ADMIN — DULOXETINE 30 MG: 30 CAPSULE, DELAYED RELEASE ORAL at 08:11

## 2017-11-16 RX ADMIN — HYDROXYZINE PAMOATE 50 MG: 50 CAPSULE ORAL at 09:11

## 2017-11-16 RX ADMIN — THERA TABS 1 TABLET: TAB at 08:11

## 2017-11-16 RX ADMIN — CLONAZEPAM 0.5 MG: 0.5 TABLET ORAL at 08:11

## 2017-11-16 RX ADMIN — Medication 500 MCG: at 08:11

## 2017-11-16 RX ADMIN — CLONAZEPAM 0.25 MG: 0.25 TABLET, ORALLY DISINTEGRATING ORAL at 08:11

## 2017-11-16 RX ADMIN — PREGABALIN 50 MG: 50 CAPSULE ORAL at 08:11

## 2017-11-16 NOTE — PLAN OF CARE
Problem: Patient Care Overview (Adult)  Goal: Discharge Needs Assessment  Patient will discharge to home. Patient's daughter will pick her up. Patient will FU with Dr. White. Patient completed safety plan.

## 2017-11-16 NOTE — PLAN OF CARE
"Problem: Patient Care Overview (Adult)  Goal: Plan of Care Review  Outcome: Ongoing (interventions implemented as appropriate)  Pt isolated to room during shift. Pt quiet, withdrawn. Pt admits to depression, but denies SI/HI, AH/VH. Pt refused snack. Medication compliant. Pt said "it is boring up here with nothing to do", says that she has been in bed thinking about everything she needs to take care of such as, a rental car she needs to return, she missed a dental appointment. Pt encouraged to interact with peers or watch tv in dayroom; however, pt went back to bed. VSS. Will continue to monitor      "

## 2017-11-16 NOTE — PLAN OF CARE
Problem: Patient Care Overview (Adult)  Goal: Plan of Care Review  Outcome: Ongoing (interventions implemented as appropriate)  Plan of care reviewed with patient, patient agrees with plan.  Denies suicidal ideations and homicidal ideations. Accepts meals, snacks and medications.  Gait steady, no falls.  Sleeping better.  Clutter-free environment and clear pathways maintained.  Promoted an individualized safety plan, effective coping strategies, and motivators to improve mood and resolve depression.  Will continue to monitor for safety.

## 2017-11-16 NOTE — PLAN OF CARE
Problem: Patient Care Overview (Adult)  Goal: Plan of Care Review  Outcome: Ongoing (interventions implemented as appropriate)  Pt out on unit presently.Pt mood remains depressed.Pt wearing hospital gown since admission.Appitite adequate.Minimal interaction with others.Denies suicidal thoughts and focused on going home.Medication compliant.

## 2017-11-16 NOTE — PLAN OF CARE
Problem: Overarching Goals (Adult)  Goal: Optimized Coping Skills in Response to Life Stressors    Intervention: Promote Effective Coping Strategies  Group Note    Behavior:  Patient attended Psychotherapy Group and participated. Patient presents with improved mood and affect.     Intervention:  Self Portrait- patients milvia a self portrait of how they viewed themselves. Discussed effects of positive and negative self images and focusing on improving weaknesses. Patients listed some of their positive qualities.      Response:  Patient milvia a colorful picture of herself on a beach with an expanse of blue water and a bright sun.  Patient milvia herself small and lone on the beach. Patient states that is one of her favorite places, but she feels all alone. Patient acknowledges she has her family and they are supportive, but at times she feels alone. Discussed making ones needs known, building support and trust.       Plan:  Will continue to encourage patient participation in group. Patient notes she has attempted to make calls today, because she wants to go home and be with her daughter and new granddaughter. Patient states she tried to call her daughter, her  and the car company, but got no responses. She will try again.

## 2017-11-16 NOTE — PROGRESS NOTES
"PSYCHIATRY DAILY INPATIENT PROGRESS NOTE  SUBSEQUENT HOSPITAL VISIT    ENCOUNTER DATE: 11/16/2017  SITE: [facility]    DATE OF ADMISSION: 11/13/2017  5:04 PM  LENGTH OF STAY: 3 days      HISTORY    CHIEF COMPLAINT   Aleah Hagan is a 52 y.o. female, seen during daily smith rounds on the inpatient unit.  Aleah Hagan presents with the chief complaint of suicidal ideation    HPI   (Elements: Location, Quality, Severity, Duration, Timing, Content, Modifying Factors, Associated Signs & Symptoms)    The patient was seen and examined. The chart was reviewed.    Staff reports no behavioral or management issues.     The patient has been compliant with treatment. The patient denied any side effects.    Patient continues to exhibit depressive symptoms.  She stays in her room largely throughout the day.  She hasn't participated in her disposition by speaking with her children, rescheduling with her , or organizing someone to  her rental car.      Continued but improving Symptoms of Depression: less diminished mood or less loss of interest/anhedonia; irritability, diminished energy, change in sleep, less change in appetite, +diminished concentration or cognition or indecisiveness, PMA/R, improved excessive guilt or hopelessness or worthlessness, no suicidal ideations     Endorses Changes in Sleep: less trouble with initiation, +maintenance, +early morning awakening with inability to return to sleep, hypersomnolence     She slept six hours     She suffers from Fibromyalgia "I hurt constantly 24/7"     Improved Suicidal/Homicidal ideations: no active/passive ideations, organized plans, future intentions     Denies Symptoms of psychosis: hallucinations, delusions, disorganized thinking, disorganized behavior or abnormal motor behavior, or negative symptoms (diminshed emotional expression, avolition, anhedonia, alogia, asociality      Denies Symptoms of soraya or hypomania: elevated, expansive, or " irritable mood with increased energy or activity; with inflated self-esteem or grandiosity, decreased need for sleep, increased rate of speech, FOI or racing thoughts, distractibility, increased goal directed activity or PMA, risky/disinhibited behavior     Continued but improved Symptoms of ALFREDA: less excessive anxiety/worry/fear, +more days than not, +about numerous issues, +difficult to control, +with restlessness, +fatigue, +poor concentration, +irritability, +muscle tension, +sleep disturbance; +causes functionally impairing distress     Has had some symptoms Symptoms of Panic Disorder: recurrent panic attacks (palpitations/heart racing, sweating, shakiness, dyspnea, choking, chest pain/discomfort, Gi symptoms, dizzy/lightheadedness, hot/col flashes, paresthesias, derealization, fear of losing control or fear of dying), precipitated or un-precipitated, source of worry and/or behavioral changes secondary, with or without agoraphobia     No apparent panic attacks while on the unit    ROS  General ROS: negative  Ophthalmic ROS: negative  ENT ROS: negative  Allergy and Immunology ROS: negative  Hematological and Lymphatic ROS: negative  Endocrine ROS: negative  Respiratory ROS: no cough, shortness of breath, or wheezing  Cardiovascular ROS: no chest pain or dyspnea on exertion  Gastrointestinal ROS: no abdominal pain, change in bowel habits, or black or bloody stools  Genito-Urinary ROS: no dysuria, trouble voiding, or hematuria  Musculoskeletal ROS: negative  Neurological ROS: no TIA or stroke symptoms  Dermatological ROS: negative    PAST MEDICAL HISTORY   Past Medical History:   Diagnosis Date    Abnormal Pap smear of cervix 11/30/2015    leep done- [facility]    Arthritis     ANH II (cervical intraepithelial neoplasia II)     Depression     Disc degeneration     ruptured    Fibromyalgia     Hx of psychiatric care     Psychiatric problem     Synovial cyst of lumbar spine     Therapy            PSYCHOTROPIC  "MEDICATIONS   Scheduled Meds:   clonazePAM  0.25 mg Oral Daily    clonazePAM  0.5 mg Oral QHS    cyanocobalamin  500 mcg Oral Daily    DULoxetine  30 mg Oral Daily    folic acid  1 mg Oral Daily    multivitamin  1 tablet Oral Daily    pantoprazole  40 mg Oral Daily    pregabalin  50 mg Oral BID     Continuous Infusions:   PRN Meds:.acetaminophen, aluminum-magnesium hydroxide-simethicone, docusate sodium, hydrOXYzine pamoate, ibuprofen, loperamide, nicotine, OLANZapine **AND** OLANZapine        EXAMINATION    VITALS   Vitals:    11/16/17 0800   BP: 115/66   Pulse: 70   Resp: 18   Temp: 97.6 °F (36.4 °C)       CONSTITUTIONAL  General Appearance: ; NAD     MUSCULOSKELETAL  Muscle Strength and Tone:  normal  Abnormal Involuntary Movements:  none  Gait and Station:  normal; non-ataxic   PSYCHIATRIC   Level of Consciousness: awake, alert  Orientation: p/p/t/s  Grooming: in hospital gown adequate to circumstances  Psychomotor Behavior:  no PMA/R  Speech: nl r/t/v/s  Language: able to repeat words English fluent  Mood: "better"  Affect: mood congruent not tearful  Thought Process:  linear and organized  Associations:  intact; no BALDEV  Thought Content: improving / no SI denied AVH/delusions; denied HI  Memory:  intact to recent and remote events  Attention:  intact to conversation; not distractible   Fund of Knowledge:  age and education appropriate  Estimate if Intelligence:  average based on work/education history, vocabulary and mental status exam  Insight:  good- seeks help  Judgment:   good- no bx issues, compliant and cooperative      DIAGNOSTIC TESTING   Laboratory Results  No results found for this or any previous visit (from the past 24 hour(s)).      MEDICAL DECISION MAKING    DIAGNOSES  Major Depressive Disorder Recurrent Severe without psychosis  Generalized Anxiety Disorder  Significant marital / psychosocial stressors  Fibromyalgia  GERD    PROBLEM LIST AND MANAGEMENT PLANS    Depression - counseling and " cymbalta  Anxiety - counseling and cymbalta, clonazepam  Fibromyalgia - cymbalta, lyrica and counseling  Psychosocial stressor - consider family meeting, counseling  Reflux - Pantoprazole    PRESCRIPTION DRUG MANAGEMENT  Compliance: yes  Side Effects: no  Regimen Adjustments:      11/14  Clonazepam 0.25mg QAM and 0.5mg QHS  Cymbalta 30mg QDay  Pantoprazole 40mg QDay  Lyrica 50mg BID      DISCHARGE PLANNING  -SW to assist with aftercare planning and collateral  -Once stable discharge home with outpatient follow up care and/or rehab  -Continue inpatient treatment under a P[facility] and/or [facility] for danger to self as evident by voiced suicidal ideation in the setting of severe depression      NEED FOR CONTINUED HOSPITALIZATION  Psychiatric illness continues to pose a potential threat to life or bodily function, of self or others, thereby requiring the need for continued inpatient psychiatric hospitalization: Yes    Protective inpatient pyschiatric hospitalization required while a safe disposition plan is enacted: Yes    Patient stabilized and ready for discharge from inpatient psychiatric unit: No      STAFF:   Ej White MD  Psychiatry

## 2017-11-16 NOTE — PLAN OF CARE
Problem: Patient Care Overview (Adult)  Goal: Plan of Care Review  Outcome: Ongoing (interventions implemented as appropriate)  Lying quiet in bed, eyes closed and respiration even and unlabored, appeared asleep.  Slept well all shift, achieving 8.5 hours of sleep so far with 3 awakenings.  Safety precautions maintained, bed low & pathway kept clear.

## 2017-11-17 VITALS
HEIGHT: 67 IN | BODY MASS INDEX: 25.74 KG/M2 | DIASTOLIC BLOOD PRESSURE: 59 MMHG | SYSTOLIC BLOOD PRESSURE: 118 MMHG | RESPIRATION RATE: 18 BRPM | TEMPERATURE: 97 F | HEART RATE: 64 BPM | WEIGHT: 164 LBS

## 2017-11-17 PROBLEM — F41.1 GAD (GENERALIZED ANXIETY DISORDER): Status: ACTIVE | Noted: 2017-11-17

## 2017-11-17 PROCEDURE — 99239 HOSP IP/OBS DSCHRG MGMT >30: CPT | Mod: ,,, | Performed by: PSYCHIATRY & NEUROLOGY

## 2017-11-17 PROCEDURE — 90833 PSYTX W PT W E/M 30 MIN: CPT | Mod: ,,, | Performed by: PSYCHIATRY & NEUROLOGY

## 2017-11-17 PROCEDURE — 25000003 PHARM REV CODE 250: Performed by: PSYCHIATRY & NEUROLOGY

## 2017-11-17 RX ORDER — PREGABALIN 50 MG/1
50 CAPSULE ORAL 2 TIMES DAILY
Qty: 60 CAPSULE | Refills: 0 | Status: SHIPPED | OUTPATIENT
Start: 2017-11-17 | End: 2017-11-30

## 2017-11-17 RX ORDER — IBUPROFEN 200 MG
1 TABLET ORAL DAILY PRN
Refills: 0 | COMMUNITY
Start: 2017-11-17 | End: 2017-12-07

## 2017-11-17 RX ORDER — IBUPROFEN 200 MG
1 TABLET ORAL DAILY
COMMUNITY
Start: 2017-11-17 | End: 2018-01-11

## 2017-11-17 RX ORDER — DULOXETIN HYDROCHLORIDE 30 MG/1
30 CAPSULE, DELAYED RELEASE ORAL DAILY
Qty: 30 CAPSULE | Refills: 0 | Status: SHIPPED | OUTPATIENT
Start: 2017-11-17 | End: 2017-12-07 | Stop reason: SDUPTHER

## 2017-11-17 RX ORDER — PANTOPRAZOLE SODIUM 40 MG/1
40 TABLET, DELAYED RELEASE ORAL DAILY
Qty: 30 TABLET | Refills: 0 | Status: SHIPPED | OUTPATIENT
Start: 2017-11-17 | End: 2017-12-18

## 2017-11-17 RX ADMIN — FOLIC ACID 1 MG: 1 TABLET ORAL at 08:11

## 2017-11-17 RX ADMIN — PANTOPRAZOLE SODIUM 40 MG: 40 TABLET, DELAYED RELEASE ORAL at 08:11

## 2017-11-17 RX ADMIN — DULOXETINE 30 MG: 30 CAPSULE, DELAYED RELEASE ORAL at 08:11

## 2017-11-17 RX ADMIN — THERA TABS 1 TABLET: TAB at 08:11

## 2017-11-17 RX ADMIN — PREGABALIN 50 MG: 50 CAPSULE ORAL at 08:11

## 2017-11-17 RX ADMIN — CLONAZEPAM 0.25 MG: 0.25 TABLET, ORALLY DISINTEGRATING ORAL at 08:11

## 2017-11-17 RX ADMIN — Medication 500 MCG: at 08:11

## 2017-11-17 NOTE — DISCHARGE SUMMARY
"Discharge Summary  Psychiatry    Admit Date: 11/13/2017    Discharge Date and Time:  11/17/2017 7:44 AM    Attending Physician: Ej White MD     Discharge Provider: Ej White    Reason for Admission:  Suicidal ideation    History of Present Illness:     I saw patient in the clinic for the first time yesterday.  She was very depressed, tearful, and talked of frequently suicidal ideation.       Patient explains that she feels suicidal but would never hurt herself.  Her 13 year old boyfriend committed suicide and she remembers the pain that caused everyone.  She describes her two month granddaughter as a reason for why she wants to live.  She also says that her [employer] is a strong supported for why she wants to live.      Patient is the midst of a separation and a divorce as described in my previous note.  That is a significant precipitating factor to her suicidal ideation / depression.  She had previous psychiatric care prior to this and suffers from chronic fibromyalgia        Endorses Symptoms of Depression: +diminished mood or +loss of interest/anhedonia; irritability, diminished energy, change in sleep, +change in appetite, +diminished concentration or cognition or indecisiveness, PMA/R, +excessive guilt or hopelessness or worthlessness, suicidal ideations     Endorses Changes in Sleep: +trouble with initiation, +maintenance, +early morning awakening with inability to return to sleep, hypersomnolence      She suffers from Fibromyalgia "I hurt constantly 24/7"     +Suicidal/Homicidal ideations: +active/passive ideations, organized plans, future intentions     Denies Symptoms of psychosis: hallucinations, delusions, disorganized thinking, disorganized behavior or abnormal motor behavior, or negative symptoms (diminshed emotional expression, avolition, anhedonia, alogia, asociality      Denies Symptoms of soraya or hypomania: elevated, expansive, or irritable mood with increased energy or activity; " "with inflated self-esteem or grandiosity, decreased need for sleep, increased rate of speech, FOI or racing thoughts, distractibility, increased goal directed activity or PMA, risky/disinhibited behavior     +Symptoms of ALFREDA: +excessive anxiety/worry/fear, +more days than not, +about numerous issues, +difficult to control, +with restlessness, +fatigue, +poor concentration, +irritability, +muscle tension, +sleep disturbance; +causes functionally impairing distress         Has had some symptoms Symptoms of Panic Disorder: recurrent panic attacks (palpitations/heart racing, sweating, shakiness, dyspnea, choking, chest pain/discomfort, Gi symptoms, dizzy/lightheadedness, hot/col flashes, paresthesias, derealization, fear of losing control or fear of dying), precipitated or un-precipitated, source of worry and/or behavioral changes secondary, with or without agoraphobia     She describes a build up anxiety at times    Procedures Performed: * No surgery found *    Hospital Course (synopsis of major diagnoses, care, treatment, and services provided during the course of the hospital stay):   The patient was stabilized and discharged on the following medications:    Cymbalta 30mg QDay for Depression / Anxiety  Clonazepam 0.5mg BID  For Anxiety  Lyrica 50mg BID for Fibromyalgia    The patient was compliant with treatment. The patient denied any side effects.     Improved Symptoms of Depression: less diminished mood or less loss of interest/anhedonia; irritability, diminished energy, change in sleep, less change in appetite, improved diminished concentration or cognition or indecisiveness, PMA/R, improved excessive guilt or hopelessness or worthlessness, no suicidal ideations     Improved Changes in Sleep: less trouble with initiation, improved maintenance, less early morning awakening with inability to return to sleep, hypersomnolence      She slept six hours consistently      She suffers from Fibromyalgia "I hurt constantly " "24/7" which effects her sleep     Improved Suicidal/Homicidal ideations: no active/passive ideations, organized plans, future intentions     Denies Symptoms of psychosis: hallucinations, delusions, disorganized thinking, disorganized behavior or abnormal motor behavior, or negative symptoms (diminshed emotional expression, avolition, anhedonia, alogia, asociality      Denies Symptoms of soraya or hypomania: elevated, expansive, or irritable mood with increased energy or activity; with inflated self-esteem or grandiosity, decreased need for sleep, increased rate of speech, FOI or racing thoughts, distractibility, increased goal directed activity or PMA, risky/disinhibited behavior     Continued but improved Symptoms of ALFREDA: less excessive anxiety/worry/fear, +more days than not, +about numerous issues, +difficult to control, +with restlessness, less fatigue, improved poor concentration, not irritability, +muscle tension, less sleep disturbance; +causes functionally impairing distress      Has had some symptoms Symptoms of Panic Disorder: recurrent panic attacks (palpitations/heart racing, sweating, shakiness, dyspnea, choking, chest pain/discomfort, Gi symptoms, dizzy/lightheadedness, hot/col flashes, paresthesias, derealization, fear of losing control or fear of dying), precipitated or un-precipitated, source of worry and/or behavioral changes secondary, with or without agoraphobia     No apparent panic attacks while on the unit    PSYCHOTHERAPY ADD-ON +[id number]   30 (16-37*) minutes    Site: [facility]  Time: 20 minutes  Participants: Met with patient    Therapeutic Intervention Type: behavior modifying psychotherapy, supportive psychotherapy  Why chosen therapy is appropriate versus another modality: relevant to diagnosis    Target symptoms: depression, suicidal ideation  Primary focus: safety plan and coping strategies  Psychotherapeutic techniques: supportive and educational psychotherapy    Outcome monitoring " methods: self-report, observation    Patient's response to intervention:  The patient's response to intervention is accepting.    Progress toward goals:  The patient's progress toward goals is good.          Discussed diagnosis, risks and benefits of proposed treatment vs alternative treatments vs no treatment, and potential side effects of these treatments.  The patient expresses understanding of the above and displays the capacity to agree with this treatment given said understanding.  Patient also agrees that, currently, the benefits outweigh the risks and would like to pursue treatment at this time.    MSE: stated age, casually dressed, well groomed.  No psychomotor agitation or retardation.  No abnormal involuntary movements.  Gait normal.  Speech normal, conversational.  Language fluent English. Mood fine.  Affect normal range, pleasant, euthymic.  Thought process linear.  Associations intact.  Denies suicidal or homicidal ideation.  Denies auditory hallucinations, paranoid ideation, ideas of reference.  Memory intact.  Attention intact.  Fund of knowledge intact.  Insight intact.  Judgment intact.  Alert and oriented to person, place, time.      Tobacco Usage:  Is patient a smoker? Yes  Does patient want prescription for Tobacco Cessation? Yes  Does patient want counseling for Tobacco Cessation? No    If patient would like to quit, then over the counter nicotine patch could be used. The patient could also follow up with his PCP or psychiatric provider for other alternatives.     Final Diagnoses:    Principal Problem: Major Depressive Disorder Recurrent Severe without psychosis   Secondary Diagnoses:   Generalized Anxiety Disorder  Significant marital / psychosocial stressors  Fibromyalgia  GERD    Labs:  Admission on 11/13/2017   Component Date Value Ref Range Status    Cholesterol 11/14/2017 195  120 - 199 mg/dL Final    Triglycerides 11/14/2017 59  30 - 150 mg/dL Final    HDL 11/14/2017 80* 40 - 75 mg/dL  Final    LDL Cholesterol 11/14/2017 103.2  63.0 - 159.0 mg/dL Final    HDL/Chol Ratio 11/14/2017 41.0  20.0 - 50.0 % Final    Total Cholesterol/HDL Ratio 11/14/2017 2.4  2.0 - 5.0 Final    Non-HDL Cholesterol 11/14/2017 115  mg/dL Final    Glucose, Fasting 11/14/2017 77  70 - 110 mg/dL Final   Admission on 11/13/2017, Discharged on 11/13/2017   Component Date Value Ref Range Status    Folate 11/13/2017 5.4  4.0 - 24.0 ng/mL Final    T3, Free 11/14/2017 2.6  2.3 - 4.2 pg/mL Final    Free T4 11/13/2017 0.96  0.71 - 1.51 ng/dL Final    Vitamin B-12 11/13/2017 1150* 210 - 950 pg/mL Final    Vit D, 25-Hydroxy 11/13/2017 32  30 - 96 ng/mL Final    Alcohol, Medical, Serum 11/13/2017 <10  <10 mg/dL Final    TSH 11/13/2017 0.946  0.400 - 4.000 uIU/mL Final    Benzodiazepines 11/13/2017 Negative   Final    Methadone metabolites 11/13/2017 Negative   Final    Cocaine (Metab.) 11/13/2017 Negative   Final    Opiate Scrn, Ur 11/13/2017 Negative   Final    Barbiturate Screen, Ur 11/13/2017 Negative   Final    Amphetamine Screen, Ur 11/13/2017 Negative   Final    THC 11/13/2017 Negative   Final    Phencyclidine 11/13/2017 Negative   Final    Creatinine, Random Ur 11/13/2017 180.5  15.0 - 325.0 mg/dL Final    Toxicology Information 11/13/2017 SEE COMMENT   Final    Specimen UA 11/13/2017 Urine, Clean Catch   Final    Color, UA 11/13/2017 Yellow  Yellow, Straw, [place] Final    Appearance, UA 11/13/2017 Clear  Clear Final    pH, UA 11/13/2017 7.0  5.0 - 8.0 Final    Specific [place], UA 11/13/2017 1.015  1.005 - 1.030 Final    Protein, UA 11/13/2017 Negative  Negative Final    Glucose, UA 11/13/2017 Negative  Negative Final    Ketones, UA 11/13/2017 Negative  Negative Final    Bilirubin (UA) 11/13/2017 Negative  Negative Final    Occult Blood UA 11/13/2017 Negative  Negative Final    Nitrite, UA 11/13/2017 Negative  Negative Final    Urobilinogen, UA 11/13/2017 4.0-6.0* <2.0 EU/dL Final    Leukocytes,  UA 11/13/2017 Trace* Negative Final    Salicylate Lvl 11/13/2017 <5.0* 15.0 - 30.0 mg/dL Final    WBC 11/13/2017 7.60  3.90 - 12.70 K/uL Final    RBC 11/13/2017 5.42* 4.00 - 5.40 M/uL Final    Hemoglobin 11/13/2017 15.5  12.0 - 16.0 g/dL Final    Hematocrit 11/13/2017 46.6  37.0 - 48.5 % Final    MCV 11/13/2017 86  82 - 98 fL Final    MCH 11/13/2017 28.6  27.0 - 31.0 pg Final    MCHC 11/13/2017 33.3  32.0 - 36.0 g/dL Final    RDW 11/13/2017 14.0  11.5 - 14.5 % Final    Platelets 11/13/2017 385* 150 - 350 K/uL Final    MPV 11/13/2017 9.5  9.2 - 12.9 fL Final    Gran # 11/13/2017 4.6  1.8 - 7.7 K/uL Final    Lymph # 11/13/2017 2.4  1.0 - 4.8 K/uL Final    Mono # 11/13/2017 0.5  0.3 - 1.0 K/uL Final    Eos # 11/13/2017 0.2  0.0 - 0.5 K/uL Final    Baso # 11/13/2017 0.05  0.00 - 0.20 K/uL Final    Gran% 11/13/2017 60.5  38.0 - 73.0 % Final    Lymph% 11/13/2017 30.9  18.0 - 48.0 % Final    Mono% 11/13/2017 5.9  4.0 - 15.0 % Final    Eosinophil% 11/13/2017 2.0  0.0 - 8.0 % Final    Basophil% 11/13/2017 0.7  0.0 - 1.9 % Final    Differential Method 11/13/2017 Automated   Final    Sodium 11/13/2017 143  136 - 145 mmol/L Final    Potassium 11/13/2017 4.1  3.5 - 5.1 mmol/L Final    Chloride 11/13/2017 103  95 - 110 mmol/L Final    CO2 11/13/2017 30* 23 - 29 mmol/L Final    Glucose 11/13/2017 104  70 - 110 mg/dL Final    BUN, Bld 11/13/2017 7  6 - 20 mg/dL Final    Creatinine 11/13/2017 0.8  0.5 - 1.4 mg/dL Final    Calcium 11/13/2017 10.3  8.7 - 10.5 mg/dL Final    Total Protein 11/13/2017 8.2  6.0 - 8.4 g/dL Final    Albumin 11/13/2017 4.1  3.5 - 5.2 g/dL Final    Total Bilirubin 11/13/2017 0.6  0.1 - 1.0 mg/dL Final    Alkaline Phosphatase 11/13/2017 157* 55 - 135 U/L Final    AST 11/13/2017 18  10 - 40 U/L Final    ALT 11/13/2017 21  10 - 44 U/L Final    Anion Gap 11/13/2017 10  8 - 16 mmol/L Final    eGFR if African American 11/13/2017 >60  >60 mL/min/1.73 m^2 Final    eGFR if non   11/13/2017 >60  >60 mL/min/1.73 m^2 Final    Acetaminophen (Tylenol), Serum 11/13/2017 <3.0* 10.0 - 20.0 ug/mL Final    WBC, UA 11/13/2017 2  0 - 5 /hpf Final    Bacteria, UA 11/13/2017 Occasional  None-Occ /hpf Final    Microscopic Comment 11/13/2017 SEE COMMENT   Final         Discharged Condition: stable and improved; not currently a danger to self/others or gravely disabled    Disposition: Home or Self Care    Is patient being discharged on multiple neuroleptics? No    Follow Up/Patient Instructions:     Medications:  Reconciled Home Medications:   Current Discharge Medication List      START taking these medications    Details   !! nicotine (NICODERM CQ) 14 mg/24 hr Place 1 patch onto the skin daily as needed (nicotine withdrawal).  Refills: 0      !! nicotine (NICODERM CQ) 14 mg/24 hr Place 1 patch onto the skin once daily.      pantoprazole (PROTONIX) 40 MG tablet Take 1 tablet (40 mg total) by mouth once daily.  Qty: 30 tablet, Refills: 0       !! - Potential duplicate medications found. Please discuss with provider.      CONTINUE these medications which have CHANGED    Details   DULoxetine (CYMBALTA) 30 MG capsule Take 1 capsule (30 mg total) by mouth once daily.  Qty: 30 capsule, Refills: 0      pregabalin (LYRICA) 50 MG capsule Take 1 capsule (50 mg total) by mouth 2 (two) times daily.  Qty: 60 capsule, Refills: 0         CONTINUE these medications which have NOT CHANGED    Details   clonazePAM (KLONOPIN) 0.5 MG tablet Take 0.5 mg by mouth 2 (two) times daily as needed for Anxiety.      cyanocobalamin, vitamin B-12, 5,000 mcg TbDL Take 1 tablet by mouth once daily.         STOP taking these medications       ibuprofen (ADVIL,MOTRIN) 800 MG tablet Comments:   Reason for Stopping:         multivitamin (ONE DAILY MULTIVITAMIN) per tablet Comments:   Reason for Stopping:         omeprazole (PRILOSEC) 40 MG capsule Comments:   Reason for Stopping:             No discharge procedures on  file.  Follow-up Information     Ej White MD On 11/30/2017.    Specialty:  Psychiatry  Why:  Outpatient Psych Services, as scheduled for 3pm  Contact information:  [address] [place] [id number]  [phone]                     Diet: regular     Activity as tolerated    Total time spent discharging patient: 35 minutes    Ej White MD  Psychiatry

## 2017-11-17 NOTE — PSYCH
Patient will be following up with Dr. Ej White at [address] [place] [address] in [place], [phone].  Appointment is on November 30 at 3 pm.  Patient will receive a tobacco cessation appointment at mentioned appointment.  Dr. White has [employer] system and has access to patients patients entire chart including the AVS.

## 2017-11-17 NOTE — NURSING
Discharge Note: Patient I\s cooperative. AVS reviewed with patient. Verbalized understanding. Patient denies suicidal ideation and homicidal ideation and is not gravely disabled. Family member will  patient and transport to home.

## 2017-11-17 NOTE — PLAN OF CARE
Problem: Patient Care Overview (Adult)  Goal: Plan of Care Review  Outcome: Ongoing (interventions implemented as appropriate)  Pt has been out of room watching tv with peer.  Calm, cooperative and pleasant.  No outbursts.  Remains depressed, fixated on discharge.  Denies SI/HI/hallucinations.  Took meds as ordered.  Received maalox and vistaril for complaint of gas bubbles and insomnia.  Will continue to monitor for safety

## 2017-11-17 NOTE — PLAN OF CARE
Problem: Patient Care Overview (Adult)  Goal: Plan of Care Review  Outcome: Ongoing (interventions implemented as appropriate)  Lying quiet in bed, eyes closed and respiration even and unlabored, appeared asleep.  Slept 5.5 hours during shift with 2 awakenings.  Safety precautions maintained, bed low & pathway kept clear.

## 2017-11-22 ENCOUNTER — PROCEDURE VISIT (OUTPATIENT)
Dept: NEUROLOGY | Facility: CLINIC | Age: 52
End: 2017-11-22
Payer: COMMERCIAL

## 2017-11-22 DIAGNOSIS — M79.18 MYOFASCIAL PAIN: ICD-10-CM

## 2017-11-22 PROCEDURE — 20553 NJX 1/MLT TRIGGER POINTS 3/>: CPT | Mod: S$GLB,,, | Performed by: NURSE PRACTITIONER

## 2017-11-22 NOTE — PROCEDURES
Procedures Cervical TPI    Trigger points were palpated bilaterally along the Bilateral trapezius, Rhomboids, and Levator Scapulae. Sites were prepped with alcohol, and injected with 1 mL 0.5% Marcaine, using a 5 mL syringe with a 30 gauge ½ inch needle.     Patient tolerated procedure well and blood loss was minimal.

## 2017-11-30 ENCOUNTER — OFFICE VISIT (OUTPATIENT)
Dept: PSYCHIATRY | Facility: CLINIC | Age: 52
End: 2017-11-30
Attending: PSYCHIATRY & NEUROLOGY
Payer: COMMERCIAL

## 2017-11-30 VITALS
RESPIRATION RATE: 18 BRPM | HEIGHT: 67 IN | DIASTOLIC BLOOD PRESSURE: 76 MMHG | SYSTOLIC BLOOD PRESSURE: 121 MMHG | HEART RATE: 74 BPM | WEIGHT: 168 LBS | BODY MASS INDEX: 26.37 KG/M2

## 2017-11-30 DIAGNOSIS — F41.1 GAD (GENERALIZED ANXIETY DISORDER): ICD-10-CM

## 2017-11-30 DIAGNOSIS — M79.7 FIBROMYALGIA SYNDROME: ICD-10-CM

## 2017-11-30 DIAGNOSIS — F33.2 SEVERE EPISODE OF RECURRENT MAJOR DEPRESSIVE DISORDER, WITHOUT PSYCHOTIC FEATURES: Primary | ICD-10-CM

## 2017-11-30 PROCEDURE — 90833 PSYTX W PT W E/M 30 MIN: CPT | Mod: S$GLB,,, | Performed by: PSYCHIATRY & NEUROLOGY

## 2017-11-30 PROCEDURE — 99214 OFFICE O/P EST MOD 30 MIN: CPT | Mod: S$GLB,,, | Performed by: PSYCHIATRY & NEUROLOGY

## 2017-11-30 PROCEDURE — 99999 PR PBB SHADOW E&M-EST. PATIENT-LVL I: CPT | Mod: PBBFAC,,, | Performed by: PSYCHIATRY & NEUROLOGY

## 2017-11-30 RX ORDER — DEXTROAMPHETAMINE SACCHARATE, AMPHETAMINE ASPARTATE, DEXTROAMPHETAMINE SULFATE AND AMPHETAMINE SULFATE 1.25; 1.25; 1.25; 1.25 MG/1; MG/1; MG/1; MG/1
5 TABLET ORAL 2 TIMES DAILY
Qty: 14 TABLET | Refills: 0 | Status: SHIPPED | OUTPATIENT
Start: 2017-11-30 | End: 2017-12-07 | Stop reason: SDUPTHER

## 2017-11-30 NOTE — PROGRESS NOTES
"Outpatient Psychiatry Follow-Up Visit (MD/NP)    11/30/2017    Clinical Status of Patient:  Outpatient (Ambulatory)    Chief Complaint:  Aleah Hagan is a 52 y.o. female who presents today for follow-up of depression and anxiety.  Met with patient.      Interval History and Content of Current Session:  Interim Events/Subjective Report/Content of Current Session:     Psychosocial  Patient has not made appointments with  / dentist.  Her  went to [place] with another woman.  She explains that she wishes she were stronger.  Her  planned a future with her.  They had planned on traveling when her kids were older and now he is traveling with another woman.      When she sees him she feels like her future is nothing.  She thinks of time wasted with this man.      Continued Symptoms of Depression: +diminished mood or +loss of interest/anhedonia; irritability, diminished energy, change in sleep, +change in appetite, +diminished concentration or cognition or indecisiveness, PMA/R, +excessive guilt or hopelessness or worthlessness, denies suicidal ideations     Continued Changes in Sleep: +trouble with initiation, +maintenance, +early morning awakening with inability to return to sleep, hypersomnolence      She suffers from Fibromyalgia "I hurt constantly 24/7"     Denies Suicidal/Homicidal ideations: no active/passive ideations, organized plans, future intentions     Denies Symptoms of psychosis: hallucinations, delusions, disorganized thinking, disorganized behavior or abnormal motor behavior, or negative symptoms (diminshed emotional expression, avolition, anhedonia, alogia, asociality      Denies Symptoms of soraya or hypomania: elevated, expansive, or irritable mood with increased energy or activity; with inflated self-esteem or grandiosity, decreased need for sleep, increased rate of speech, FOI or racing thoughts, distractibility, increased goal directed activity or PMA, risky/disinhibited " behavior     Continued Symptoms of ALFREDA: +excessive anxiety/worry/fear, +more days than not, +about numerous issues, +difficult to control, +with restlessness, +fatigue, +poor concentration, +irritability, +muscle tension, +sleep disturbance; +causes functionally impairing distress      Continued Symptoms of Panic Disorder: +recurrent panic attacks (palpitations/heart racing, sweating, shakiness, dyspnea, choking, chest pain/discomfort, Gi symptoms, dizzy/lightheadedness, hot/col flashes, paresthesias, derealization, fear of losing control or fear of dying), +precipitated or un-precipitated, source of worry and/or behavioral changes secondary, with or without agoraphobia    She continues to have panic attacks associated with seeing her .      Psychotherapy:  · Target symptoms: depression, anxiety   · Why chosen therapy is appropriate versus another modality: relevant to diagnosis  · Outcome monitoring methods: self-report, observation  · Therapeutic intervention type: behavior modifying psychotherapy, supportive psychotherapy  · Topics discussed/themes: relationships difficulties, stress related to medical comorbidities, difficulty managing affect in interpersonal relationships, building skills sets for symptom management, symptom recognition  · The patient's response to the intervention is accepting. The patient's progress toward treatment goals is limited.   · Duration of intervention: 17 minutes.    Review of Systems   · PSYCHIATRIC: Pertinant items are noted in the narrative.  · CONSTITUTIONAL: No weight gain or loss.   · MUSCULOSKELETAL: has chronic pain from fibromyalgia   · NEUROLOGIC: No weakness, sensory changes, seizures, confusion, memory loss, tremor or other abnormal movements.  · RESPIRATORY: No shortness of breath.  · CARDIOVASCULAR: No tachycardia or chest pain.  · GASTROINTESTINAL: No nausea, vomiting, pain, constipation or diarrhea.  · GENITOURINARY: No frequency, dysuria or sexual  dysfunction.  · HEMATOLOGIC/LYMPHATIC: No excessive bleeding, prolonged or excessive bleeding after dental extraction/injury.    Past Medical, Family and Social History: The patient's past medical, family and social history have been reviewed and updated as appropriate within the electronic medical record - see encounter notes.    Compliance: yes    Side effects: None    Risk Parameters:  Patient reports no suicidal ideation  Patient reports no homicidal ideation  Patient reports no self-injurious behavior  Patient reports no violent behavior    Exam (detailed: at least 9 elements; comprehensive: all 15 elements)   Constitutional  Vitals:  Most recent vital signs, dated less than 90 days prior to this appointment, were reviewed.   There were no vitals filed for this visit.     General:  unremarkable, age appropriate     Musculoskeletal  Muscle Strength/Tone:  no dystonia   Gait & Station:  non-ataxic     Psychiatric  Speech:  no latency; no press   Mood & Affect:  depressed  congruent and appropriate   Thought Process:  normal and logical   Associations:  intact   Thought Content:  normal, no suicidality, no homicidality, delusions, or paranoia   Insight:  has awareness of illness   Judgement: behavior is adequate to circumstances   Orientation:  grossly intact   Memory: intact for content of interview   Language: grossly intact   Attention Span & Concentration:  able to focus   Fund of Knowledge:  intact and appropriate to age and level of education     Assessment and Diagnosis   Status/Progress: Based on the examination today, the patient's problem(s) is/are inadequately controlled.  New problems have not been presented today.   Co-morbidities are not complicating management of the primary condition.  There are no active rule-out diagnoses for this patient at this time.     General Impression:       ICD-10-CM ICD-9-CM   1. Severe episode of recurrent major depressive disorder, without psychotic features F33.2  296.33   2. Fibromyalgia syndrome M79.7 729.1   3. ALFREDA (generalized anxiety disorder) F41.1 300.02       Intervention/Counseling/Treatment Plan   · Medication Management: The risks and benefits of medication were discussed with the patient. continue Cymbalta 30mg QDay for depression, Start Adderall 5mg BID for depression offlabel increase motivation, discontinue Lyrica as it is not efficacious  · Counseling provided with patient as follows: importance of compliance with chosen treatment options was emphasized, risks and benefits of treatment options, including medications, were discussed with the patient      Return to Clinic: 1 week     Patient denies suicidal ideation.  Safety plan was discussed.  We discussed possible side effects of offlabel use of adderall and the ability for her to stop it immediately if she has a bad outcome with it.

## 2017-11-30 NOTE — PATIENT INSTRUCTIONS
Please take Adderall 5mg in the morning and at noon.      Take one lyrica per day for seven days and then stop    Continue your other medication as prescribed     Any increase in anxiety, fixation with video games / smoking, or just intolerance of adderall, stop immediately and call the office    Please get a counselor

## 2017-12-01 ENCOUNTER — HOSPITAL ENCOUNTER (OUTPATIENT)
Dept: RADIOLOGY | Facility: HOSPITAL | Age: 52
Discharge: HOME | End: 2017-12-01
Attending: OBSTETRICS & GYNECOLOGY
Payer: COMMERCIAL

## 2017-12-01 VITALS — WEIGHT: 167 LBS | BODY MASS INDEX: 26.21 KG/M2 | HEIGHT: 67 IN

## 2017-12-01 DIAGNOSIS — N64.89 ASYMMETRICAL BREASTS: ICD-10-CM

## 2017-12-01 DIAGNOSIS — T85.848A PAIN FROM BREAST IMPLANT, INITIAL ENCOUNTER: ICD-10-CM

## 2017-12-07 ENCOUNTER — TELEPHONE (OUTPATIENT)
Dept: PSYCHIATRY | Facility: CLINIC | Age: 52
End: 2017-12-07

## 2017-12-07 ENCOUNTER — OFFICE VISIT (OUTPATIENT)
Dept: PSYCHIATRY | Facility: CLINIC | Age: 52
End: 2017-12-07
Attending: PSYCHIATRY & NEUROLOGY
Payer: COMMERCIAL

## 2017-12-07 VITALS
WEIGHT: 165.25 LBS | RESPIRATION RATE: 18 BRPM | HEART RATE: 83 BPM | HEIGHT: 67 IN | DIASTOLIC BLOOD PRESSURE: 82 MMHG | SYSTOLIC BLOOD PRESSURE: 129 MMHG | BODY MASS INDEX: 25.94 KG/M2

## 2017-12-07 DIAGNOSIS — F41.1 GAD (GENERALIZED ANXIETY DISORDER): ICD-10-CM

## 2017-12-07 DIAGNOSIS — F33.2 SEVERE EPISODE OF RECURRENT MAJOR DEPRESSIVE DISORDER, WITHOUT PSYCHOTIC FEATURES: Primary | ICD-10-CM

## 2017-12-07 DIAGNOSIS — M79.7 FIBROMYALGIA SYNDROME: ICD-10-CM

## 2017-12-07 PROCEDURE — 99999 PR PBB SHADOW E&M-EST. PATIENT-LVL III: CPT | Mod: PBBFAC,,, | Performed by: PSYCHIATRY & NEUROLOGY

## 2017-12-07 PROCEDURE — 90833 PSYTX W PT W E/M 30 MIN: CPT | Mod: S$GLB,,, | Performed by: PSYCHIATRY & NEUROLOGY

## 2017-12-07 PROCEDURE — 99214 OFFICE O/P EST MOD 30 MIN: CPT | Mod: S$GLB,,, | Performed by: PSYCHIATRY & NEUROLOGY

## 2017-12-07 RX ORDER — OMEPRAZOLE 40 MG/1
40 CAPSULE, DELAYED RELEASE ORAL DAILY
Qty: 30 CAPSULE | Refills: 2 | Status: SHIPPED | OUTPATIENT
Start: 2017-12-07 | End: 2019-08-19 | Stop reason: SDUPTHER

## 2017-12-07 RX ORDER — DULOXETIN HYDROCHLORIDE 60 MG/1
60 CAPSULE, DELAYED RELEASE ORAL DAILY
Qty: 30 CAPSULE | Refills: 0 | Status: SHIPPED | OUTPATIENT
Start: 2017-12-07 | End: 2017-12-18 | Stop reason: SDUPTHER

## 2017-12-07 RX ORDER — DEXTROAMPHETAMINE SACCHARATE, AMPHETAMINE ASPARTATE, DEXTROAMPHETAMINE SULFATE AND AMPHETAMINE SULFATE 1.25; 1.25; 1.25; 1.25 MG/1; MG/1; MG/1; MG/1
5 TABLET ORAL 2 TIMES DAILY
Qty: 14 TABLET | Refills: 0 | Status: SHIPPED | OUTPATIENT
Start: 2017-12-07 | End: 2017-12-18 | Stop reason: DRUGHIGH

## 2017-12-07 RX ORDER — IBUPROFEN 200 MG
1 TABLET ORAL DAILY
Qty: 21 PATCH | Refills: 2 | Status: SHIPPED | OUTPATIENT
Start: 2017-12-07 | End: 2018-01-11

## 2017-12-07 NOTE — TELEPHONE ENCOUNTER
Patient is requesting a prescription for Nicoderm CQ. She states it would be much cheaper for her to go thru her insurance than to pay for it OTC.

## 2017-12-07 NOTE — PROGRESS NOTES
Outpatient Psychiatry Follow-Up Visit (MD/NP)    12/7/2017    Clinical Status of Patient:  Outpatient (Ambulatory)    Chief Complaint:  Aleah Hagan is a 52 y.o. female who presents today for follow-up of depression and anxiety.  Met with patient.      Interval History and Content of Current Session:  Interim Events/Subjective Report/Content of Current Session:     Patient was able to be more productive with stimulant.  She has finally gotten to her bills. She has been doing laundry and caring for herself better.      Psychosocial  Both children are moving out of her house.  She is worried about how isolated she will be then.  She will still be watching her grand daughter some.  She still has not contacted a  or a counselor.  She explains that she only recently started to feel more capable of doing things a couple days ago.  She found her  with another woman at his apartment.      Improving Symptoms of Depression: less diminished mood or less loss of interest/anhedonia; irritability, diminished energy, +change in sleep, +change in appetite, improving diminished concentration or cognition or indecisiveness, PMA/R, less excessive guilt or hopelessness or worthlessness, denies suicidal ideations     Continued Changes in Sleep: +trouble with initiation, +maintenance, +early morning awakening with inability to return to sleep, hypersomnolence     She finds that the adderall initially hurt her sleep but this has balanced itself.  She has had some heart burn at night.  Her chronic pain also causes her problems.       Denies Suicidal/Homicidal ideations: no active/passive ideations, organized plans, future intentions     Denies Symptoms of psychosis: hallucinations, delusions, disorganized thinking, disorganized behavior or abnormal motor behavior, or negative symptoms (diminshed emotional expression, avolition, anhedonia, alogia, asociality      Denies Symptoms of soraya or hypomania: elevated, expansive,  or irritable mood with increased energy or activity; with inflated self-esteem or grandiosity, decreased need for sleep, increased rate of speech, FOI or racing thoughts, distractibility, increased goal directed activity or PMA, risky/disinhibited behavior     Somewhat improving Symptoms of ALFREDA: +excessive anxiety/worry/fear, +more days than not, +about numerous issues, +difficult to control, +with restlessness, +fatigue, +poor concentration, +irritability, +muscle tension, +sleep disturbance; +causes functionally impairing distress      She takes clonazepam less than twice per day.  We discussed timing the clonazepam around 6pm as she is winding down her day.      Somewhat improving Symptoms of Panic Disorder: +recurrent panic attacks (palpitations/heart racing, sweating, shakiness, dyspnea, choking, chest pain/discomfort, Gi symptoms, dizzy/lightheadedness, hot/col flashes, paresthesias, derealization, fear of losing control or fear of dying), +precipitated or un-precipitated, source of worry and/or behavioral changes secondary, with or without agoraphobia    She has had fewer panic symptoms over the past week    Psychotherapy:  · Target symptoms: depression, anxiety   · Why chosen therapy is appropriate versus another modality: relevant to diagnosis  · Outcome monitoring methods: self-report, observation  · Therapeutic intervention type: behavior modifying psychotherapy, supportive psychotherapy  · Topics discussed/themes: relationships difficulties, stress related to medical comorbidities, difficulty managing affect in interpersonal relationships, building skills sets for symptom management, symptom recognition  · The patient's response to the intervention is accepting. The patient's progress toward treatment goals is limited.   · Duration of intervention: 19 minutes.    Review of Systems   · PSYCHIATRIC: Pertinant items are noted in the narrative.  · CONSTITUTIONAL: No weight gain or loss.   · MUSCULOSKELETAL:  "has chronic pain from fibromyalgia worsening this appointment  · NEUROLOGIC: No weakness, sensory changes, seizures, confusion, memory loss, tremor or other abnormal movements.  · RESPIRATORY: No shortness of breath.  · CARDIOVASCULAR: No tachycardia or chest pain.  · GASTROINTESTINAL: No nausea, vomiting, pain, constipation or diarrhea.  · GENITOURINARY: No frequency, dysuria or sexual dysfunction.  · HEMATOLOGIC/LYMPHATIC: No excessive bleeding, prolonged or excessive bleeding after dental extraction/injury.    Past Medical, Family and Social History: The patient's past medical, family and social history have been reviewed and updated as appropriate within the electronic medical record - see encounter notes.    Compliance: yes    Side effects: None    Risk Parameters:  Patient reports no suicidal ideation  Patient reports no homicidal ideation  Patient reports no self-injurious behavior  Patient reports no violent behavior    Exam (detailed: at least 9 elements; comprehensive: all 15 elements)   Constitutional  Vitals:  Most recent vital signs, dated less than 90 days prior to this appointment, were reviewed.   Vitals:    12/07/17 1132   BP: 129/82   Pulse: 83   Resp: 18   Weight: 75 kg (165 lb 3.8 oz)   Height: 5' 7" (1.702 m)        General:  unremarkable, age appropriate     Musculoskeletal  Muscle Strength/Tone:  no dystonia   Gait & Station:  non-ataxic     Psychiatric  Speech:  no latency; no press   Mood & Affect:  euthymic  congruent and appropriate   Thought Process:  normal and logical   Associations:  intact   Thought Content:  normal, no suicidality, no homicidality, delusions, or paranoia   Insight:  has awareness of illness   Judgement: behavior is adequate to circumstances   Orientation:  grossly intact   Memory: intact for content of interview   Language: grossly intact   Attention Span & Concentration:  able to focus   Fund of Knowledge:  intact and appropriate to age and level of education "     Assessment and Diagnosis   Status/Progress: Based on the examination today, the patient's problem(s) is/are improved.  New problems have not been presented today.   Co-morbidities are not complicating management of the primary condition.  There are no active rule-out diagnoses for this patient at this time.     General Impression:       ICD-10-CM ICD-9-CM   1. Severe episode of recurrent major depressive disorder, without psychotic features F33.2 296.33   2. ALFREDA (generalized anxiety disorder) F41.1 300.02   3. Fibromyalgia syndrome M79.7 729.1       Intervention/Counseling/Treatment Plan   · Medication Management: The risks and benefits of medication were discussed with the patient. Increase Cymbalta 60mg QDay for depression, Continue Adderall 5mg BID for depression offlabel increase motivation, discontinue Lyrica as it is not efficacious, discontinue amitriptyline  · Counseling provided with patient as follows: importance of compliance with chosen treatment options was emphasized, risks and benefits of treatment options, including medications, were discussed with the patient      Return to Clinic: 1 week     Patient denies suicidal ideation.  Safety plan was discussed.  We discussed possible side effects of offlabel use of adderall and the ability for her to stop it immediately if she has a bad outcome with it.

## 2017-12-18 ENCOUNTER — OFFICE VISIT (OUTPATIENT)
Dept: PSYCHIATRY | Facility: CLINIC | Age: 52
End: 2017-12-18
Attending: PSYCHIATRY & NEUROLOGY
Payer: COMMERCIAL

## 2017-12-18 VITALS
HEIGHT: 67 IN | HEART RATE: 73 BPM | DIASTOLIC BLOOD PRESSURE: 79 MMHG | BODY MASS INDEX: 25.58 KG/M2 | WEIGHT: 163 LBS | SYSTOLIC BLOOD PRESSURE: 125 MMHG | RESPIRATION RATE: 17 BRPM

## 2017-12-18 DIAGNOSIS — F41.1 GAD (GENERALIZED ANXIETY DISORDER): ICD-10-CM

## 2017-12-18 DIAGNOSIS — M79.7 FIBROMYALGIA SYNDROME: ICD-10-CM

## 2017-12-18 DIAGNOSIS — F33.1 MODERATE EPISODE OF RECURRENT MAJOR DEPRESSIVE DISORDER: Primary | ICD-10-CM

## 2017-12-18 PROCEDURE — 90833 PSYTX W PT W E/M 30 MIN: CPT | Mod: S$GLB,,, | Performed by: PSYCHIATRY & NEUROLOGY

## 2017-12-18 PROCEDURE — 99999 PR PBB SHADOW E&M-EST. PATIENT-LVL III: CPT | Mod: PBBFAC,,, | Performed by: PSYCHIATRY & NEUROLOGY

## 2017-12-18 PROCEDURE — 99214 OFFICE O/P EST MOD 30 MIN: CPT | Mod: S$GLB,,, | Performed by: PSYCHIATRY & NEUROLOGY

## 2017-12-18 RX ORDER — DEXTROAMPHETAMINE SACCHARATE, AMPHETAMINE ASPARTATE, DEXTROAMPHETAMINE SULFATE AND AMPHETAMINE SULFATE 2.5; 2.5; 2.5; 2.5 MG/1; MG/1; MG/1; MG/1
10 TABLET ORAL 2 TIMES DAILY
Qty: 60 TABLET | Refills: 0 | Status: SHIPPED | OUTPATIENT
Start: 2017-12-18 | End: 2018-02-06 | Stop reason: SDUPTHER

## 2017-12-18 RX ORDER — DULOXETIN HYDROCHLORIDE 60 MG/1
60 CAPSULE, DELAYED RELEASE ORAL DAILY
Qty: 30 CAPSULE | Refills: 1 | Status: SHIPPED | OUTPATIENT
Start: 2017-12-18 | End: 2018-02-06 | Stop reason: SDUPTHER

## 2017-12-18 NOTE — PROGRESS NOTES
"Outpatient Psychiatry Follow-Up Visit (MD/NP)    2017    Clinical Status of Patient:  Outpatient (Ambulatory)    Chief Complaint:  Aleah Hagan is a 52 y.o. female who presents today for follow-up of depression and anxiety.  Met with patient.      Interval History and Content of Current Session:  Interim Events/Subjective Report/Content of Current Session:     Psychosocial    Patient has been helping her daughter paint.  She has also contacted a .  She has anxiety related to finances.  She enjoys spending time with her daughter and grand child.  She finds the holidays very stressful because her parents  around the holidays.      Both her daughter and son are moving out of her house.  She is getting "empty nest" syndrome.      Improving but continued Symptoms of Depression: less diminished mood or less loss of interest/anhedonia; irritability, diminished energy, +change in sleep, +change in appetite, improving diminished concentration or cognition or indecisiveness, PMA/R, less excessive guilt or hopelessness or worthlessness, denies suicidal ideations    "Im not crying as much.  I'm not living... I'm just here"     Improving Changes in Sleep: less trouble with initiation, +maintenance, +early morning awakening with inability to return to sleep, hypersomnolence     She is sleeping about six hours of sleep which is a significant improvement.       Denies Suicidal/Homicidal ideations: no active/passive ideations, organized plans, future intentions     Denies Symptoms of psychosis: hallucinations, delusions, disorganized thinking, disorganized behavior or abnormal motor behavior, or negative symptoms (diminshed emotional expression, avolition, anhedonia, alogia, asociality      Denies Symptoms of soraya or hypomania: elevated, expansive, or irritable mood with increased energy or activity; with inflated self-esteem or grandiosity, decreased need for sleep, increased rate of speech, FOI or racing " thoughts, distractibility, increased goal directed activity or PMA, risky/disinhibited behavior     Somewhat improving Symptoms of ALFREDA: +excessive anxiety/worry/fear, +more days than not, +about numerous issues, +difficult to control, +with restlessness, +fatigue, +poor concentration, +irritability, +muscle tension, +sleep disturbance; +causes functionally impairing distress      She takes clonazepam less than twice per day.  We discussed timing the clonazepam around 6pm as she is winding down her day.      Somewhat improving Symptoms of Panic Disorder: +recurrent panic attacks (palpitations/heart racing, sweating, shakiness, dyspnea, choking, chest pain/discomfort, Gi symptoms, dizzy/lightheadedness, hot/col flashes, paresthesias, derealization, fear of losing control or fear of dying), +precipitated or un-precipitated, source of worry and/or behavioral changes secondary, with or without agoraphobia    She had a panic attack last night but is having them less often.      Current Medication  Clonazepam 0.5mg QHS  Adderall 5mg BID  Cymbalta 60mg QDay    Patients  shows ambien on 12/7.  She says that she does not take this medication as I instructed her to stop it.  She also is not taking lyrica.  We discussed not mixing ambien with clonazepam.  I encouraged her to give them to the pharmacy to dispose.      Psychotherapy:  · Target symptoms: depression, anxiety   · Why chosen therapy is appropriate versus another modality: relevant to diagnosis  · Outcome monitoring methods: self-report, observation  · Therapeutic intervention type: behavior modifying psychotherapy, supportive psychotherapy  · Topics discussed/themes: relationships difficulties, stress related to medical comorbidities, difficulty managing affect in interpersonal relationships, building skills sets for symptom management, symptom recognition  · The patient's response to the intervention is accepting. The patient's progress toward treatment goals is  "limited.   · Duration of intervention: 16 minutes.    Attempting to motivate patient to make changes and increase productivity    Review of Systems   · PSYCHIATRIC: Pertinant items are noted in the narrative.  · CONSTITUTIONAL: No weight gain or loss.   · MUSCULOSKELETAL: has chronic pain from fibromyalgia worsening this appointment -- continued  · NEUROLOGIC: No weakness, sensory changes, seizures, confusion, memory loss, tremor or other abnormal movements.  · RESPIRATORY: No shortness of breath.  · CARDIOVASCULAR: No tachycardia or chest pain.  · GASTROINTESTINAL: No nausea, vomiting, pain, constipation or diarrhea.  · GENITOURINARY: No frequency, dysuria or sexual dysfunction.  · HEMATOLOGIC/LYMPHATIC: No excessive bleeding, prolonged or excessive bleeding after dental extraction/injury.    Past Medical, Family and Social History: The patient's past medical, family and social history have been reviewed and updated as appropriate within the electronic medical record - see encounter notes.    Compliance: yes    Side effects: None    Risk Parameters:  Patient reports no suicidal ideation  Patient reports no homicidal ideation  Patient reports no self-injurious behavior  Patient reports no violent behavior    Exam (detailed: at least 9 elements; comprehensive: all 15 elements)   Constitutional  Vitals:  Most recent vital signs, dated less than 90 days prior to this appointment, were reviewed.   Vitals:    12/18/17 1502   BP: 125/79   Pulse: 73   Resp: 17   Weight: 73.9 kg (163 lb 0.5 oz)   Height: 5' 7" (1.702 m)        General:  unremarkable, age appropriate     Musculoskeletal  Muscle Strength/Tone:  no dystonia   Gait & Station:  non-ataxic     Psychiatric  Speech:  no latency; no press   Mood & Affect:  euthymic  congruent and appropriate   Thought Process:  normal and logical   Associations:  intact   Thought Content:  normal, no suicidality, no homicidality, delusions, or paranoia   Insight:  has awareness of " illness   Judgement: behavior is adequate to circumstances   Orientation:  grossly intact   Memory: intact for content of interview   Language: grossly intact   Attention Span & Concentration:  able to focus   Fund of Knowledge:  intact and appropriate to age and level of education     Assessment and Diagnosis   Status/Progress: Based on the examination today, the patient's problem(s) is/are adequately but not ideally controlled.  New problems have not been presented today.   Co-morbidities are not complicating management of the primary condition.  There are no active rule-out diagnoses for this patient at this time.     General Impression:       ICD-10-CM ICD-9-CM   1. Moderate episode of recurrent major depressive disorder F33.1 296.32   2. ALFREDA (generalized anxiety disorder) F41.1 300.02   3. Fibromyalgia syndrome M79.7 729.1       Intervention/Counseling/Treatment Plan   · Medication Management: The risks and benefits of medication were discussed with the patient. ContinueCymbalta 60mg QDay for depression / fibromyalgia, Continue but inrcrease Adderall 10mg BID for depression offlabel increase motivation, discontinue Lyrica as it is not efficacious, discontinue amitriptyline --- patient getting these from pharmacy because daughter picking up medicine  · Counseling provided with patient as follows: importance of compliance with chosen treatment options was emphasized, risks and benefits of treatment options, including medications, were discussed with the patient      Return to Clinic: 2 weeks     Safety plan discussed.  Patient with resolving suicidal ideation

## 2018-01-11 ENCOUNTER — OFFICE VISIT (OUTPATIENT)
Dept: PSYCHIATRY | Facility: CLINIC | Age: 53
End: 2018-01-11
Attending: PSYCHIATRY & NEUROLOGY
Payer: COMMERCIAL

## 2018-01-11 VITALS
HEART RATE: 83 BPM | WEIGHT: 160.94 LBS | BODY MASS INDEX: 25.26 KG/M2 | HEIGHT: 67 IN | DIASTOLIC BLOOD PRESSURE: 88 MMHG | SYSTOLIC BLOOD PRESSURE: 129 MMHG | RESPIRATION RATE: 19 BRPM

## 2018-01-11 DIAGNOSIS — F41.1 GAD (GENERALIZED ANXIETY DISORDER): ICD-10-CM

## 2018-01-11 DIAGNOSIS — M79.7 FIBROMYALGIA SYNDROME: ICD-10-CM

## 2018-01-11 DIAGNOSIS — F33.0 MILD EPISODE OF RECURRENT MAJOR DEPRESSIVE DISORDER: Primary | ICD-10-CM

## 2018-01-11 PROCEDURE — 99214 OFFICE O/P EST MOD 30 MIN: CPT | Mod: S$GLB,,, | Performed by: PSYCHIATRY & NEUROLOGY

## 2018-01-11 PROCEDURE — 99999 PR PBB SHADOW E&M-EST. PATIENT-LVL II: CPT | Mod: PBBFAC,,, | Performed by: PSYCHIATRY & NEUROLOGY

## 2018-01-11 PROCEDURE — 90833 PSYTX W PT W E/M 30 MIN: CPT | Mod: S$GLB,,, | Performed by: PSYCHIATRY & NEUROLOGY

## 2018-01-11 RX ORDER — DEXTROAMPHETAMINE SACCHARATE, AMPHETAMINE ASPARTATE MONOHYDRATE, DEXTROAMPHETAMINE SULFATE AND AMPHETAMINE SULFATE 5; 5; 5; 5 MG/1; MG/1; MG/1; MG/1
20 CAPSULE, EXTENDED RELEASE ORAL EVERY MORNING
Qty: 30 CAPSULE | Refills: 0 | Status: SHIPPED | OUTPATIENT
Start: 2018-01-18 | End: 2018-02-06

## 2018-01-11 RX ORDER — CLONAZEPAM 0.5 MG/1
0.5 TABLET ORAL NIGHTLY PRN
Qty: 30 TABLET | Refills: 0 | Status: SHIPPED | OUTPATIENT
Start: 2018-01-11 | End: 2018-04-20 | Stop reason: SDUPTHER

## 2018-01-11 NOTE — PROGRESS NOTES
Outpatient Psychiatry Follow-Up Visit (MD/NP)    1/11/2018    Clinical Status of Patient:  Outpatient (Ambulatory)    Chief Complaint:  Aleah Hagan is a 52 y.o. female who presents today for follow-up of depression and anxiety.  Met with patient.      Interval History and Content of Current Session:  Interim Events/Subjective Report/Content of Current Session:     Psychosocial    Pain is significantly helped with cymbalta    Patient was excited that they could all still wear pajamas during the holiday.  She has met with a  now which is a significant improvement from before.      She is having some struggles with an old best friend who might be seeing her .        She is more isolated now that her family has moved out of her house.      Improving but continued Symptoms of Depression: less diminished mood or less loss of interest/anhedonia; irritability, diminished energy, +change in sleep, +change in appetite, improving diminished concentration or cognition or indecisiveness, PMA/R, less excessive guilt or hopelessness or worthlessness, denies suicidal ideations    Patient has more days where she feels like her old self.  She does go for days where she feels depressed, largely related to circumstances.      She is spending less time on her iPAD.  She is watching her granddaughter which gives her things to do.       Improved Changes in Sleep: less trouble with initiation, +maintenance, +early morning awakening with inability to return to sleep, hypersomnolence     She is sleeping about six hours of sleep which is a significant improvement.  She wakes up frequently because of incontinence she has had since her hysterectomy.       Denies Suicidal/Homicidal ideations: no active/passive ideations, organized plans, future intentions     Denies Symptoms of psychosis: hallucinations, delusions, disorganized thinking, disorganized behavior or abnormal motor behavior, or negative symptoms (diminshed  emotional expression, avolition, anhedonia, alogia, asociality      Denies Symptoms of soraya or hypomania: elevated, expansive, or irritable mood with increased energy or activity; with inflated self-esteem or grandiosity, decreased need for sleep, increased rate of speech, FOI or racing thoughts, distractibility, increased goal directed activity or PMA, risky/disinhibited behavior     Improving Symptoms of ALFREDA: less excessive anxiety/worry/fear, +more days than not, +about numerous issues, +difficult to control, +with restlessness, +fatigue, +poor concentration, +irritability, +muscle tension, +sleep disturbance; +causes functionally impairing distress      She is taking less clonazepam.  Her anxiety is improving    Somewhat improving Symptoms of Panic Disorder: +recurrent panic attacks (palpitations/heart racing, sweating, shakiness, dyspnea, choking, chest pain/discomfort, Gi symptoms, dizzy/lightheadedness, hot/col flashes, paresthesias, derealization, fear of losing control or fear of dying), +precipitated or un-precipitated, source of worry and/or behavioral changes secondary, with or without agoraphobia    Panic attacks continue to be less often    Current Medication  Clonazepam 0.5mg QHS  Adderall 5mg BID  Cymbalta 60mg QDay    We discussed not combining ambien and clonazepam.  She doesn't have ambien or nortriptyline anymore.      Psychotherapy:  · Target symptoms: depression, anxiety   · Why chosen therapy is appropriate versus another modality: relevant to diagnosis  · Outcome monitoring methods: self-report, observation  · Therapeutic intervention type: behavior modifying psychotherapy, supportive psychotherapy  · Topics discussed/themes: relationships difficulties, stress related to medical comorbidities, difficulty managing affect in interpersonal relationships, building skills sets for symptom management, symptom recognition  · The patient's response to the intervention is accepting. The patient's  "progress toward treatment goals is limited.   · Duration of intervention: 22 minutes.    We discussed many psychosocial stressors in a supportive way    Review of Systems   · PSYCHIATRIC: Pertinant items are noted in the narrative.  · CONSTITUTIONAL: No weight gain or loss.   · MUSCULOSKELETAL: has chronic pain from fibromyalgia worsening this appointment -- continued  · NEUROLOGIC: No weakness, sensory changes, seizures, confusion, memory loss, tremor or other abnormal movements.  · RESPIRATORY: No shortness of breath.  · CARDIOVASCULAR: No tachycardia or chest pain.  · GASTROINTESTINAL: No nausea, vomiting, pain, constipation or diarrhea.  · GENITOURINARY: No frequency, dysuria or sexual dysfunction.  · HEMATOLOGIC/LYMPHATIC: No excessive bleeding, prolonged or excessive bleeding after dental extraction/injury.    Past Medical, Family and Social History: The patient's past medical, family and social history have been reviewed and updated as appropriate within the electronic medical record - see encounter notes.    Compliance: yes    Side effects: None    Risk Parameters:  Patient reports no suicidal ideation  Patient reports no homicidal ideation  Patient reports no self-injurious behavior  Patient reports no violent behavior    Exam (detailed: at least 9 elements; comprehensive: all 15 elements)   Constitutional  Vitals:  Most recent vital signs, dated less than 90 days prior to this appointment, were reviewed.   Vitals:    01/11/18 1233   BP: 129/88   Pulse: 83   Resp: 19   Weight: 73 kg (160 lb 15 oz)   Height: 5' 7" (1.702 m)        General:  unremarkable, age appropriate     Musculoskeletal  Muscle Strength/Tone:  no dystonia   Gait & Station:  non-ataxic     Psychiatric  Speech:  no latency; no press   Mood & Affect:  euthymic  congruent and appropriate   Thought Process:  normal and logical   Associations:  intact   Thought Content:  normal, no suicidality, no homicidality, delusions, or paranoia   Insight: "  has awareness of illness   Judgement: behavior is adequate to circumstances   Orientation:  grossly intact   Memory: intact for content of interview   Language: grossly intact   Attention Span & Concentration:  able to focus   Fund of Knowledge:  intact and appropriate to age and level of education     Assessment and Diagnosis   Status/Progress: Based on the examination today, the patient's problem(s) is/are adequately but not ideally controlled.  New problems have not been presented today.   Co-morbidities are not complicating management of the primary condition.  There are no active rule-out diagnoses for this patient at this time.     General Impression:       ICD-10-CM ICD-9-CM   1. Mild episode of recurrent major depressive disorder F33.0 296.31   2. ALFREDA (generalized anxiety disorder) F41.1 300.02   3. Fibromyalgia syndrome M79.7 729.1       Intervention/Counseling/Treatment Plan   · Medication Management: The risks and benefits of medication were discussed with the patient. ContinueCymbalta 60mg QDay for depression / fibromyalgia, Change to Adderall XR 20mg for depression offlabel increase motivation, discontinue Lyrica as it is not efficacious, discontinue amitriptyline --- patient getting these from pharmacy because daughter picking up medicine  · Counseling provided with patient as follows: importance of compliance with chosen treatment options was emphasized, risks and benefits of treatment options, including medications, were discussed with the patient      Return to Clinic: 2 weeks     Safety plan discussed. Patient without suicidal ideation

## 2018-02-05 ENCOUNTER — HOSPITAL ENCOUNTER (EMERGENCY)
Facility: HOSPITAL | Age: 53
Discharge: HOME | End: 2018-02-05
Attending: SURGERY
Payer: COMMERCIAL

## 2018-02-05 VITALS
WEIGHT: 160 LBS | HEART RATE: 81 BPM | TEMPERATURE: 97 F | SYSTOLIC BLOOD PRESSURE: 142 MMHG | DIASTOLIC BLOOD PRESSURE: 89 MMHG | BODY MASS INDEX: 25.06 KG/M2 | RESPIRATION RATE: 20 BRPM

## 2018-02-05 DIAGNOSIS — R51.9 ACUTE NONINTRACTABLE HEADACHE, UNSPECIFIED HEADACHE TYPE: Primary | ICD-10-CM

## 2018-02-05 PROCEDURE — 99284 EMERGENCY DEPT VISIT MOD MDM: CPT | Mod: 25

## 2018-02-05 PROCEDURE — 63600175 PHARM REV CODE 636 W HCPCS: Performed by: NURSE PRACTITIONER

## 2018-02-05 PROCEDURE — 96372 THER/PROPH/DIAG INJ SC/IM: CPT

## 2018-02-05 RX ORDER — KETOROLAC TROMETHAMINE 30 MG/ML
60 INJECTION, SOLUTION INTRAMUSCULAR; INTRAVENOUS
Status: COMPLETED | OUTPATIENT
Start: 2018-02-05 | End: 2018-02-05

## 2018-02-05 RX ORDER — KETOROLAC TROMETHAMINE 10 MG/1
10 TABLET, FILM COATED ORAL EVERY 6 HOURS
Qty: 20 TABLET | Refills: 0 | Status: SHIPPED | OUTPATIENT
Start: 2018-02-05 | End: 2018-02-10

## 2018-02-05 RX ADMIN — KETOROLAC TROMETHAMINE 60 MG: 30 INJECTION, SOLUTION INTRAMUSCULAR at 11:02

## 2018-02-05 NOTE — ED PROVIDER NOTES
Encounter Date: 2018       History     Chief Complaint   Patient presents with    Head Injury    Headache     Patient presents with headache. States that she fell 2 weeks ago on some ice and hit her posterior head. Has had a generalized intermittent headache since that time. Four days ago the headache become more persistent on the R posterior side with intermittent stabbing pain rated 6/10. Pain is worse with bending. Patient didn't take any medications at home for pain. Denies dizziness, numbness, tingling, or weakness. Denies blurred vision or vision changes.       The history is provided by the patient.     Review of patient's allergies indicates:   Allergen Reactions    Codeine Hives    Shrimp Hives     Past Medical History:   Diagnosis Date    Abnormal Pap smear of cervix 2015    leep done- [facility]    Arthritis     ANH II (cervical intraepithelial neoplasia II)     Depression     Disc degeneration     ruptured    Fibromyalgia     Hx of psychiatric care     Psychiatric problem     Synovial cyst of lumbar spine     Therapy      Past Surgical History:   Procedure Laterality Date    APPENDECTOMY      age 22    BREAST SURGERY      augmentation    CERVICAL BIOPSY  W/ LOOP ELECTRODE EXCISION  17    ANH 1     SECTION, CLASSIC      x2    CHOLECYSTECTOMY  10/2013    gastric sleeve  13    HYSTERECTOMY  2017    [facility]- cervical dysp    NECK SURGERY  2015    cosmetic surgery    TONSILLECTOMY, ADENOIDECTOMY  age 9     Family History   Problem Relation Age of Onset    Lung cancer Mother     Lung cancer Father     Thyroid cancer Sister     Ovarian cancer Neg Hx     Breast cancer Neg Hx     Colon cancer Neg Hx      Social History   Substance Use Topics    Smoking status: Current Every Day Smoker     Packs/day: 0.30     Years: 10.00     Types: Cigarettes     Start date: 1984    Smokeless tobacco: Never Used      Comment: stopped for 23 years and then started  2 years ago again     Alcohol use Yes      Comment: Socially-2 times a month on the weekend-3 drinks each day      Review of Systems   Constitutional: Negative for chills, fatigue and fever.   HENT: Negative for congestion, dental problem, ear pain, rhinorrhea, sore throat and trouble swallowing.    Eyes: Negative for pain, discharge, redness and visual disturbance.   Respiratory: Negative for cough, chest tightness, shortness of breath and wheezing.    Cardiovascular: Negative for chest pain, palpitations and leg swelling.   Gastrointestinal: Negative for abdominal pain, constipation, diarrhea, nausea and vomiting.   Genitourinary: Negative for difficulty urinating, dysuria, flank pain, frequency, hematuria and urgency.   Musculoskeletal: Negative for arthralgias, back pain and myalgias.   Skin: Negative for color change, pallor and rash.   Neurological: Positive for headaches. Negative for dizziness, seizures, facial asymmetry, speech difficulty, weakness, light-headedness and numbness.   Psychiatric/Behavioral: Negative.        Physical Exam     Initial Vitals [02/05/18 1117]   BP Pulse Resp Temp SpO2   (!) 142/89 81 20 96.6 °F (35.9 °C) --      MAP       106.67         Physical Exam    Nursing note and vitals reviewed.  Constitutional: No distress.   HENT:   Head: Normocephalic and atraumatic.   Right Ear: External ear normal.   Left Ear: External ear normal.   Nose: Nose normal.   Mouth/Throat: Oropharynx is clear and moist.   Eyes: Conjunctivae, EOM and lids are normal. Pupils are equal, round, and reactive to light.   Neck: Normal range of motion. Neck supple.   Cardiovascular: Normal rate, regular rhythm, S1 normal, S2 normal and intact distal pulses.   Pulmonary/Chest: Effort normal and breath sounds normal. No respiratory distress. She has no wheezes. She has no rhonchi.   Abdominal: Soft. Bowel sounds are normal. She exhibits no distension. There is no tenderness.   Musculoskeletal: Normal range of  motion.   Lymphadenopathy:     She has no cervical adenopathy.   Neurological: She is alert and oriented to person, place, and time. She has normal strength. No cranial nerve deficit or sensory deficit. GCS eye subscore is 4. GCS verbal subscore is 5. GCS motor subscore is 6.   Skin: Skin is warm and dry. Capillary refill takes less than 2 seconds. No rash noted.   Psychiatric: She has a normal mood and affect. Her speech is normal and behavior is normal.         ED Course   Procedures       X-Rays:   Independently Interpreted Readings:   Head CT: CT was reviewed with MD. CT without concerning findings.          Medications   ketorolac injection 60 mg (60 mg Intramuscular Given 2/5/18 6991)   Patient reports that her headache has improved since administration of IM Toradol.                  ED Course      Clinical Impression:   The encounter diagnosis was Acute nonintractable headache, unspecified headache type.    Disposition:   Disposition: Discharged  Condition: Stable    The patient acknowledges that close follow up with medical provider is required. Instructed to follow up with Dr. Maciel within 2 days. The patient agrees to comply with all instruction and directions given in the ER.      New Prescriptions    KETOROLAC (TORADOL) 10 MG TABLET    Take 1 tablet (10 mg total) by mouth every 6 (six) hours.                         Evelyn Nicole NP  02/05/18 6807

## 2018-02-05 NOTE — ED TRIAGE NOTES
52 y.o. female presents to ER   Chief Complaint   Patient presents with    Head Injury    Headache   Pt reports she fell and hit her head 2 weeks ago. Has been having a headache ever since. Reports intermitten sharp pain to right eye. No acute distress noted.

## 2018-02-05 NOTE — DISCHARGE INSTRUCTIONS
**Drink plenty fluids. Get plenty rest. Wash hands frequently.    **Our goal in the emergency department is to always give you outstanding care and exceptional service. You may receive a survey by mail or e-mail in the next week regarding your experience in our ED. We would greatly appreciate your completing and returning the survey. Your feedback provides us with a way to recognize our staff who give very good care and it helps us learn how to improve when your experience was below our aspiration of excellence.     **Return to the ER as needed.

## 2018-02-06 ENCOUNTER — OFFICE VISIT (OUTPATIENT)
Dept: PSYCHIATRY | Facility: CLINIC | Age: 53
End: 2018-02-06
Attending: PSYCHIATRY & NEUROLOGY
Payer: COMMERCIAL

## 2018-02-06 VITALS
DIASTOLIC BLOOD PRESSURE: 82 MMHG | HEART RATE: 84 BPM | RESPIRATION RATE: 19 BRPM | SYSTOLIC BLOOD PRESSURE: 122 MMHG | HEIGHT: 67 IN | WEIGHT: 161.81 LBS | BODY MASS INDEX: 25.4 KG/M2

## 2018-02-06 DIAGNOSIS — F33.0 MILD EPISODE OF RECURRENT MAJOR DEPRESSIVE DISORDER: Primary | ICD-10-CM

## 2018-02-06 DIAGNOSIS — M79.7 FIBROMYALGIA SYNDROME: ICD-10-CM

## 2018-02-06 DIAGNOSIS — F41.1 GAD (GENERALIZED ANXIETY DISORDER): ICD-10-CM

## 2018-02-06 PROCEDURE — 90833 PSYTX W PT W E/M 30 MIN: CPT | Mod: S$GLB,,, | Performed by: PSYCHIATRY & NEUROLOGY

## 2018-02-06 PROCEDURE — 3008F BODY MASS INDEX DOCD: CPT | Mod: ,,, | Performed by: PSYCHIATRY & NEUROLOGY

## 2018-02-06 PROCEDURE — 99999 PR PBB SHADOW E&M-EST. PATIENT-LVL III: CPT | Mod: PBBFAC,,, | Performed by: PSYCHIATRY & NEUROLOGY

## 2018-02-06 PROCEDURE — 99213 OFFICE O/P EST LOW 20 MIN: CPT | Mod: S$GLB,,, | Performed by: PSYCHIATRY & NEUROLOGY

## 2018-02-06 RX ORDER — DEXTROAMPHETAMINE SACCHARATE, AMPHETAMINE ASPARTATE, DEXTROAMPHETAMINE SULFATE AND AMPHETAMINE SULFATE 2.5; 2.5; 2.5; 2.5 MG/1; MG/1; MG/1; MG/1
10 TABLET ORAL 2 TIMES DAILY
Qty: 60 TABLET | Refills: 0 | Status: SHIPPED | OUTPATIENT
Start: 2018-02-06 | End: 2018-03-06 | Stop reason: SDUPTHER

## 2018-02-06 RX ORDER — DULOXETIN HYDROCHLORIDE 60 MG/1
60 CAPSULE, DELAYED RELEASE ORAL DAILY
Qty: 30 CAPSULE | Refills: 1 | Status: SHIPPED | OUTPATIENT
Start: 2018-02-06 | End: 2018-03-06 | Stop reason: SDUPTHER

## 2018-02-06 RX ORDER — OMEPRAZOLE 40 MG/1
40 CAPSULE, DELAYED RELEASE ORAL DAILY
Qty: 30 CAPSULE | Refills: 11 | Status: SHIPPED | OUTPATIENT
Start: 2018-02-06 | End: 2018-04-16 | Stop reason: SDUPTHER

## 2018-02-06 NOTE — PROGRESS NOTES
Outpatient Psychiatry Follow-Up Visit (MD/NP)    2/6/2018    Clinical Status of Patient:  Outpatient (Ambulatory)    Chief Complaint:  Aleah Hagan is a 52 y.o. female who presents today for follow-up of depression and anxiety.  Met with patient.      Interval History and Content of Current Session:  Interim Events/Subjective Report/Content of Current Session:     Psychosocial     Patients nervous about her parade and her marital conflict.  This has caused an increase in her overall feeling of body pain.  Her daughter has moved in because her boyfriend, whos the father of their 4 month old, was cheating on her.      Improving Symptoms of Depression: less diminished mood or less loss of interest/anhedonia; irritability, diminished energy, +change in sleep, +change in appetite, improving diminished concentration or cognition or indecisiveness, PMA/R, less excessive guilt or hopelessness or worthlessness, denies suicidal ideations    Patient has more days where she feels like her old self.  She does go for days where she feels depressed, largely related to circumstances.      She is spending less time on her iPAD.  She is watching her granddaughter which gives her things to do.       Improved but varied Changes in Sleep: less trouble with initiation, +maintenance, +early morning awakening with inability to return to sleep, hypersomnolence     Her sleep has decreased to about five hours per night.       Denies Suicidal/Homicidal ideations: no active/passive ideations, organized plans, future intentions     Denies Symptoms of psychosis: hallucinations, delusions, disorganized thinking, disorganized behavior or abnormal motor behavior, or negative symptoms (diminshed emotional expression, avolition, anhedonia, alogia, asociality      Denies Symptoms of soraya or hypomania: elevated, expansive, or irritable mood with increased energy or activity; with inflated self-esteem or grandiosity, decreased need for sleep,  increased rate of speech, FOI or racing thoughts, distractibility, increased goal directed activity or PMA, risky/disinhibited behavior     Improving Symptoms of ALFREDA: less excessive anxiety/worry/fear, +more days than not, +about numerous issues, +difficult to control, +with restlessness, +fatigue, +poor concentration, +irritability, +muscle tension, +sleep disturbance; +causes functionally impairing distress      She is taking less clonazepam.  Her anxiety is improving    Somewhat improving Symptoms of Panic Disorder: +recurrent panic attacks (palpitations/heart racing, sweating, shakiness, dyspnea, choking, chest pain/discomfort, Gi symptoms, dizzy/lightheadedness, hot/col flashes, paresthesias, derealization, fear of losing control or fear of dying), +precipitated or un-precipitated, source of worry and/or behavioral changes secondary, with or without agoraphobia    Panic attacks continue to be less often    Current Medication  Clonazepam 0.5mg QHS  Adderall XR 20mg QDay  Cymbalta 60mg QDay    We discussed not combining ambien and clonazepam.  She doesn't have ambien or nortriptyline anymore.      Psychotherapy:  · Target symptoms: depression, anxiety   · Why chosen therapy is appropriate versus another modality: relevant to diagnosis  · Outcome monitoring methods: self-report, observation  · Therapeutic intervention type: behavior modifying psychotherapy, supportive psychotherapy  · Topics discussed/themes: relationships difficulties, stress related to medical comorbidities, difficulty managing affect in interpersonal relationships, building skills sets for symptom management, symptom recognition  · The patient's response to the intervention is accepting. The patient's progress toward treatment goals is limited.   · Duration of intervention: 25 minutes.    We discussed many psychosocial stressors in a supportive way.     Review of Systems   · PSYCHIATRIC: Pertinant items are noted in the  "narrative.  · CONSTITUTIONAL: No weight gain or loss.   · MUSCULOSKELETAL: has chronic pain from fibromyalgia worsening this appointment -- continued  · NEUROLOGIC: No weakness, sensory changes, seizures, confusion, memory loss, tremor or other abnormal movements.  · RESPIRATORY: No shortness of breath.  · CARDIOVASCULAR: No tachycardia or chest pain.  · GASTROINTESTINAL: No nausea, vomiting, pain, constipation or diarrhea.  · GENITOURINARY: No frequency, dysuria or sexual dysfunction.  · HEMATOLOGIC/LYMPHATIC: No excessive bleeding, prolonged or excessive bleeding after dental extraction/injury.    Past Medical, Family and Social History: The patient's past medical, family and social history have been reviewed and updated as appropriate within the electronic medical record - see encounter notes.    Compliance: yes    Side effects: None    Risk Parameters:  Patient reports no suicidal ideation  Patient reports no homicidal ideation  Patient reports no self-injurious behavior  Patient reports no violent behavior    Exam (detailed: at least 9 elements; comprehensive: all 15 elements)   Constitutional  Vitals:  Most recent vital signs, dated less than 90 days prior to this appointment, were reviewed.   Vitals:    02/06/18 1137   BP: 122/82   Pulse: 84   Resp: 19   Weight: 73.4 kg (161 lb 13.1 oz)   Height: 5' 7" (1.702 m)        General:  unremarkable, age appropriate     Musculoskeletal  Muscle Strength/Tone:  no dystonia   Gait & Station:  non-ataxic     Psychiatric  Speech:  no latency; no press   Mood & Affect:  euthymic  congruent and appropriate   Thought Process:  normal and logical   Associations:  intact   Thought Content:  normal, no suicidality, no homicidality, delusions, or paranoia   Insight:  has awareness of illness   Judgement: behavior is adequate to circumstances   Orientation:  grossly intact   Memory: intact for content of interview   Language: grossly intact   Attention Span & Concentration:  able " to focus   Fund of Knowledge:  intact and appropriate to age and level of education     Assessment and Diagnosis   Status/Progress: Based on the examination today, the patient's problem(s) is/are adequately but not ideally controlled.  New problems have not been presented today.   Co-morbidities are complicating management of the primary condition.  There are no active rule-out diagnoses for this patient at this time.     General Impression:       ICD-10-CM ICD-9-CM   1. Mild episode of recurrent major depressive disorder F33.0 296.31   2. Fibromyalgia syndrome M79.7 729.1   3. ALFREDA (generalized anxiety disorder) F41.1 300.02       Intervention/Counseling/Treatment Plan   · Medication Management: The risks and benefits of medication were discussed with the patient. Continue Cymbalta 60mg QDay for depression / fibromyalgia, Change to Adderall 10mg BID for depression offlabel increase motivation  ·  Counseling provided with patient as follows: importance of compliance with chosen treatment options was emphasized, risks and benefits of treatment options, including medications, were discussed with the patient      Return to Clinic: 1 month

## 2018-03-06 ENCOUNTER — OFFICE VISIT (OUTPATIENT)
Dept: PSYCHIATRY | Facility: CLINIC | Age: 53
End: 2018-03-06
Attending: PSYCHIATRY & NEUROLOGY
Payer: COMMERCIAL

## 2018-03-06 VITALS
SYSTOLIC BLOOD PRESSURE: 131 MMHG | RESPIRATION RATE: 18 BRPM | WEIGHT: 158.5 LBS | HEIGHT: 67 IN | HEART RATE: 79 BPM | BODY MASS INDEX: 24.88 KG/M2 | DIASTOLIC BLOOD PRESSURE: 82 MMHG

## 2018-03-06 DIAGNOSIS — M79.7 FIBROMYALGIA SYNDROME: ICD-10-CM

## 2018-03-06 DIAGNOSIS — F41.1 GAD (GENERALIZED ANXIETY DISORDER): ICD-10-CM

## 2018-03-06 DIAGNOSIS — F33.1 MODERATE EPISODE OF RECURRENT MAJOR DEPRESSIVE DISORDER: Primary | ICD-10-CM

## 2018-03-06 PROCEDURE — 99213 OFFICE O/P EST LOW 20 MIN: CPT | Mod: S$GLB,,, | Performed by: PSYCHIATRY & NEUROLOGY

## 2018-03-06 PROCEDURE — 99999 PR PBB SHADOW E&M-EST. PATIENT-LVL III: CPT | Mod: PBBFAC,,, | Performed by: PSYCHIATRY & NEUROLOGY

## 2018-03-06 PROCEDURE — 90833 PSYTX W PT W E/M 30 MIN: CPT | Mod: S$GLB,,, | Performed by: PSYCHIATRY & NEUROLOGY

## 2018-03-06 RX ORDER — DULOXETIN HYDROCHLORIDE 60 MG/1
60 CAPSULE, DELAYED RELEASE ORAL DAILY
Qty: 30 CAPSULE | Refills: 1 | Status: SHIPPED | OUTPATIENT
Start: 2018-03-06 | End: 2018-04-20 | Stop reason: SDUPTHER

## 2018-03-06 RX ORDER — DEXTROAMPHETAMINE SACCHARATE, AMPHETAMINE ASPARTATE, DEXTROAMPHETAMINE SULFATE AND AMPHETAMINE SULFATE 2.5; 2.5; 2.5; 2.5 MG/1; MG/1; MG/1; MG/1
10 TABLET ORAL 2 TIMES DAILY
Qty: 60 TABLET | Refills: 0 | Status: SHIPPED | OUTPATIENT
Start: 2018-03-06 | End: 2018-05-03 | Stop reason: SDUPTHER

## 2018-03-06 RX ORDER — DULOXETIN HYDROCHLORIDE 30 MG/1
30 CAPSULE, DELAYED RELEASE ORAL DAILY
Qty: 30 CAPSULE | Refills: 1 | Status: SHIPPED | OUTPATIENT
Start: 2018-03-06 | End: 2018-05-02 | Stop reason: ALTCHOICE

## 2018-03-06 NOTE — PROGRESS NOTES
Outpatient Psychiatry Follow-Up Visit (MD/NP)    3/6/2018    Clinical Status of Patient:  Outpatient (Ambulatory)    Chief Complaint:  Aleah Hagan is a 52 y.o. female who presents today for follow-up of depression and anxiety.  Met with patient.      Interval History and Content of Current Session:  Interim Events/Subjective Report/Content of Current Session:     Psychosocial     Patients rachelle sandoval went well.  She continues to struggle with finding a  an wanted to speak about this mostly.  Her body hurts which is exacerbated by her watching a child who is 17 pounds.  Her next appointment is with Dr. Maciel in a month.      Varying Symptoms of Depression: +diminished mood or +loss of interest/anhedonia; irritability, diminished energy, +change in sleep, +change in appetite, improving diminished concentration or cognition or indecisiveness, PMA/R, less excessive guilt or hopelessness or worthlessness, denies suicidal ideations    She finds her depression has worsened due to circumstances and watching her grandchild.      She is spending less time on her iPAD.  She is watching her granddaughter which gives her things to do.       Changes in Sleep: less trouble with initiation, +maintenance, +early morning awakening with inability to return to sleep, hypersomnolence     She is still sleeping 4-5 hours     Denies Suicidal/Homicidal ideations: no active/passive ideations, organized plans, future intentions     Denies Symptoms of psychosis: hallucinations, delusions, disorganized thinking, disorganized behavior or abnormal motor behavior, or negative symptoms (diminshed emotional expression, avolition, anhedonia, alogia, asociality      Denies Symptoms of soraya or hypomania: elevated, expansive, or irritable mood with increased energy or activity; with inflated self-esteem or grandiosity, decreased need for sleep, increased rate of speech, FOI or racing thoughts, distractibility, increased goal  directed activity or PMA, risky/disinhibited behavior     Worsening Symptoms of ALFREDA: less excessive anxiety/worry/fear, +more days than not, +about numerous issues, +difficult to control, +with restlessness, +fatigue, +poor concentration, +irritability, +muscle tension, +sleep disturbance; +causes functionally impairing distress      She has stopped taking clonazepam and has had worsening of her anxiety.      Denies Symptoms of Panic Disorder: less recurrent panic attacks (palpitations/heart racing, sweating, shakiness, dyspnea, choking, chest pain/discomfort, Gi symptoms, dizzy/lightheadedness, hot/col flashes, paresthesias, derealization, fear of losing control or fear of dying), +precipitated or un-precipitated, source of worry and/or behavioral changes secondary, with or without agoraphobia    Panic attacks continue to be less often    Current Medication  Clonazepam 0.5mg QHS - stopped taking this past month  Adderall XR 20mg QDay  Cymbalta 60mg QDay    I encouraged her to restart her clonazepam    Psychotherapy:  · Target symptoms: depression, anxiety   · Why chosen therapy is appropriate versus another modality: relevant to diagnosis  · Outcome monitoring methods: self-report, observation  · Therapeutic intervention type: behavior modifying psychotherapy, supportive psychotherapy  · Topics discussed/themes: relationships difficulties, stress related to medical comorbidities, difficulty managing affect in interpersonal relationships, building skills sets for symptom management, symptom recognition  · The patient's response to the intervention is accepting. The patient's progress toward treatment goals is limited.   · Duration of intervention: 21 minutes.    We discussed many psychosocial stressors in a supportive way.     Review of Systems   · PSYCHIATRIC: Pertinant items are noted in the narrative.  · CONSTITUTIONAL: No weight gain or loss.   · MUSCULOSKELETAL: has chronic pain from fibromyalgia worsening this  "appointment -- continued  · NEUROLOGIC: No weakness, sensory changes, seizures, confusion, memory loss, tremor or other abnormal movements.  · RESPIRATORY: No shortness of breath.  · CARDIOVASCULAR: No tachycardia or chest pain.  · GASTROINTESTINAL: No nausea, vomiting, pain, constipation or diarrhea.  · GENITOURINARY: No frequency, dysuria or sexual dysfunction.  · HEMATOLOGIC/LYMPHATIC: No excessive bleeding, prolonged or excessive bleeding after dental extraction/injury.    Past Medical, Family and Social History: The patient's past medical, family and social history have been reviewed and updated as appropriate within the electronic medical record - see encounter notes.    Compliance: yes    Side effects: None    Risk Parameters:  Patient reports no suicidal ideation  Patient reports no homicidal ideation  Patient reports no self-injurious behavior  Patient reports no violent behavior    Exam (detailed: at least 9 elements; comprehensive: all 15 elements)   Constitutional  Vitals:  Most recent vital signs, dated less than 90 days prior to this appointment, were reviewed.   Vitals:    03/06/18 1231   BP: 131/82   Pulse: 79   Resp: 18   Weight: 71.9 kg (158 lb 8.2 oz)   Height: 5' 7" (1.702 m)        General:  unremarkable, age appropriate     Musculoskeletal  Muscle Strength/Tone:  no dystonia   Gait & Station:  non-ataxic     Psychiatric  Speech:  no latency; no press   Mood & Affect:  euthymic  congruent and appropriate   Thought Process:  normal and logical   Associations:  intact   Thought Content:  normal, no suicidality, no homicidality, delusions, or paranoia   Insight:  has awareness of illness   Judgement: behavior is adequate to circumstances   Orientation:  grossly intact   Memory: intact for content of interview   Language: grossly intact   Attention Span & Concentration:  able to focus   Fund of Knowledge:  intact and appropriate to age and level of education     Assessment and Diagnosis "   Status/Progress: Based on the examination today, the patient's problem(s) is/are worsening.  New problems have not been presented today.   Co-morbidities are complicating management of the primary condition.  There are no active rule-out diagnoses for this patient at this time.     General Impression:       ICD-10-CM ICD-9-CM   1. Moderate episode of recurrent major depressive disorder F33.1 296.32   2. ALFREDA (generalized anxiety disorder) F41.1 300.02   3. Fibromyalgia syndrome M79.7 729.1       Intervention/Counseling/Treatment Plan   · Medication Management: The risks and benefits of medication were discussed with the patient. Increase Cymbalta 90mg QDay for depression / fibromyalgia, Continue Adderall 10mg BID for depression offlabel increase motivation  Restart Clonazepam 0.5mg QHS    ·  Counseling provided with patient as follows: importance of compliance with chosen treatment options was emphasized, risks and benefits of treatment options, including medications, were discussed with the patient      Return to Clinic: 1 month

## 2018-04-16 ENCOUNTER — OFFICE VISIT (OUTPATIENT)
Dept: NEUROLOGY | Facility: CLINIC | Age: 53
End: 2018-04-16
Payer: COMMERCIAL

## 2018-04-16 VITALS
SYSTOLIC BLOOD PRESSURE: 124 MMHG | HEART RATE: 102 BPM | DIASTOLIC BLOOD PRESSURE: 86 MMHG | WEIGHT: 155.44 LBS | RESPIRATION RATE: 16 BRPM | BODY MASS INDEX: 24.4 KG/M2 | HEIGHT: 67 IN

## 2018-04-16 DIAGNOSIS — M79.7 FIBROMYALGIA: Primary | ICD-10-CM

## 2018-04-16 DIAGNOSIS — M47.816 LUMBAR FACET ARTHROPATHY: ICD-10-CM

## 2018-04-16 PROCEDURE — 99214 OFFICE O/P EST MOD 30 MIN: CPT | Mod: S$GLB,,, | Performed by: PSYCHIATRY & NEUROLOGY

## 2018-04-16 PROCEDURE — 99999 PR PBB SHADOW E&M-EST. PATIENT-LVL III: CPT | Mod: PBBFAC,,, | Performed by: PSYCHIATRY & NEUROLOGY

## 2018-04-16 NOTE — PROGRESS NOTES
HPI: Aleah Hagan is a 52 y.o. female with  Fibromyalgia and mild  Lumbar facet arthropathy with multiple areas of pain.      Since the last visit, patient is seeing psychiatry, Dr White for depression and anxiety after seeing him and subsequently being admitted to [facility] last year.  She reported to the ER with headache after fall on ice in 1/2018 and CT head was unremarkable/ see below  She is not on pamelor  She went trigger point injection prior without relief  The patient states she continues to have pain which also contributes to her mood.  Pamelor was discontinued prior  Her pain is diffuse as prior. She feels it is severe. No radicular lumbar pain  She did not think Lyrica worked and stopped this medication. She is worried her diagnosis is more than what has been stated. She continues to have back pain with all of her pain  She is no longer using Flexeril due to concern for sedation with psychiatry  She is on Cymbalta again. She does enjoy babysitting her grand daughter.       Review of Systems   Constitutional: Negative for fever.   HENT: Negative for nosebleeds.    Eyes: Negative for double vision.   Respiratory: Negative for hemoptysis.    Cardiovascular: Negative for leg swelling.   Gastrointestinal: Negative for blood in stool.   Genitourinary: Negative for hematuria.   Musculoskeletal: Positive for myalgias.   Skin: Negative for rash.   Neurological: Negative for tremors.   Psychiatric/Behavioral: The patient has insomnia.        Exam:  Gen Appearance, well developed/nourished in no apparent distress  CV: 2+ distal pulses with no edema or swelling  Neuro:  MS: Awake, alert, Sustains attention..  Recent/remote memory intact, Language is full to spontaneous speech/comprehension. Fund of Knowledge is full.   Mood is euthymic  CN: Optic discs are flat with normal vasculature, PERRL, Extraoccular movements and visual fields are full. Normal facial sensation and strength, Hearing symmetric, Tongue and  Palate are midline and strong. Shoulder Shrug symmetric and strong.  Motor: Normal bulk, tone, no abnormal movements. 5/5 strength bilateral upper/lower extremities with 2+ reflexes  Sensory: symmetric to  Temp,  and vibration Romberg negative  Cerebellar: Heal to shin, Finger to nose, Rapid alternating movements intact  Gait: Normal stance, no ataxia  Multiple tender points.     Imaging 10/1/13 MRI brain:  IMPRESSION:   UNREMARKABLE MRI OF THE BRAIN SPECIFICALLY WITHOUT   EVIDENCE FOR ACUTE INFARCTION OR ENHANCING LESION.   CLINICAL CORRELATION AND FURTHER EVALUATION AS   WARRANTED.     CT head 1/2018: Unremarkable noncontrast CT head specifically without evidence for acute intracranial hemorrhage. Further evaluation as warrented clinically.        Assessment/Plan: Aleah Hagan is a 52 y.o. female with  Fibromyalgia and mild  Lumbar facet arthropathy (by MRI L spine 2012) with multiple areas of pain.    I recommend:     1.  She continues treatment for anxiety and depression with psychiatry, Dr White and is using cymbalta  2.  Her fibromyalgia pain continues but she is not sure Lyrica helped prior and she is currently off medication (can't tolerate higher dose than 50mg BID)  3.  For fibromyalgia pain. No relief with TPIs, Further Pamelor will be avoided given her psychiatric treatment, ongoing, Neuropathic cream given for fibro pain not helpful. Elavil worsened constipation.  Gabapentin was also tried without relief.  Flexeril  D/c'ed due to concern for sedation by psychiatry. Had some relief with dry needling prior.   4. She would like a second opinion about her fibromyalgia diagnosis as her pain has been refractory. Will arrange per orders  5. Back brace ordered. Consider repeating imaging if any alarming symptoms like lumbar radicular pain.       RTC 6 months

## 2018-04-20 ENCOUNTER — OFFICE VISIT (OUTPATIENT)
Dept: PSYCHIATRY | Facility: CLINIC | Age: 53
End: 2018-04-20
Attending: PSYCHIATRY & NEUROLOGY
Payer: COMMERCIAL

## 2018-04-20 VITALS
BODY MASS INDEX: 24.72 KG/M2 | DIASTOLIC BLOOD PRESSURE: 74 MMHG | HEART RATE: 75 BPM | WEIGHT: 157.5 LBS | HEIGHT: 67 IN | SYSTOLIC BLOOD PRESSURE: 132 MMHG | RESPIRATION RATE: 18 BRPM

## 2018-04-20 DIAGNOSIS — F33.1 MODERATE EPISODE OF RECURRENT MAJOR DEPRESSIVE DISORDER: Primary | ICD-10-CM

## 2018-04-20 DIAGNOSIS — F41.1 GAD (GENERALIZED ANXIETY DISORDER): ICD-10-CM

## 2018-04-20 DIAGNOSIS — M79.7 FIBROMYALGIA SYNDROME: ICD-10-CM

## 2018-04-20 PROCEDURE — 99999 PR PBB SHADOW E&M-EST. PATIENT-LVL I: CPT | Mod: PBBFAC,,, | Performed by: PSYCHIATRY & NEUROLOGY

## 2018-04-20 PROCEDURE — 90833 PSYTX W PT W E/M 30 MIN: CPT | Mod: S$GLB,,, | Performed by: PSYCHIATRY & NEUROLOGY

## 2018-04-20 PROCEDURE — 99213 OFFICE O/P EST LOW 20 MIN: CPT | Mod: S$GLB,,, | Performed by: PSYCHIATRY & NEUROLOGY

## 2018-04-20 RX ORDER — CLONAZEPAM 1 MG/1
1 TABLET ORAL NIGHTLY
Qty: 30 TABLET | Refills: 0 | Status: SHIPPED | OUTPATIENT
Start: 2018-04-20 | End: 2018-05-25 | Stop reason: SDUPTHER

## 2018-04-20 RX ORDER — DULOXETIN HYDROCHLORIDE 60 MG/1
120 CAPSULE, DELAYED RELEASE ORAL DAILY
Qty: 60 CAPSULE | Refills: 1 | Status: SHIPPED | OUTPATIENT
Start: 2018-04-20 | End: 2018-05-25 | Stop reason: SDUPTHER

## 2018-04-20 NOTE — PROGRESS NOTES
Outpatient Psychiatry Follow-Up Visit (MD/NP)    4/20/2018    Clinical Status of Patient:  Outpatient (Ambulatory)    Chief Complaint:  Aleah Hagan is a 52 y.o. female who presents today for follow-up of depression and anxiety.  Met with patient.      Interval History and Content of Current Session:  Interim Events/Subjective Report/Content of Current Session:     Patient is 15 minutes late to 30 minute appointment.  Will discuss soon follow up to spend more time for possibly adjusting medication.      Current Medication  Clonazepam 0.5mg QHS - stopped taking this past month  Adderall XR 20mg QDay  Cymbalta 60mg QDay      Psychosocial     She continues to have trouble with her .  Her ex-best friend is in a relationship with her .  She has been working on things with her .  Her court date is in June.      Varying Symptoms of Depression: varied diminished mood or +loss of interest/anhedonia; irritability, diminished energy, +change in sleep, +change in appetite, improving diminished concentration or cognition or indecisiveness, PMA/R, less excessive guilt or hopelessness or worthlessness, denies suicidal ideations    She is spending less time on her iPAD.  She is watching her granddaughter which gives her things to do.       Varied Changes in Sleep: less trouble with initiation, +maintenance, +early morning awakening with inability to return to sleep, hypersomnolence     She is still sleeping 4-5 hours     Denies Suicidal/Homicidal ideations: no active/passive ideations, organized plans, future intentions     Denies Symptoms of psychosis: hallucinations, delusions, disorganized thinking, disorganized behavior or abnormal motor behavior, or negative symptoms (diminshed emotional expression, avolition, anhedonia, alogia, asociality      Denies Symptoms of soraya or hypomania: elevated, expansive, or irritable mood with increased energy or activity; with inflated self-esteem or grandiosity,  decreased need for sleep, increased rate of speech, FOI or racing thoughts, distractibility, increased goal directed activity or PMA, risky/disinhibited behavior     Continued Symptoms of ALFREDA: + excessive anxiety/worry/fear, +more days than not, +about numerous issues, +difficult to control, +with restlessness, +fatigue, +poor concentration, +irritability, +muscle tension, +sleep disturbance; +causes functionally impairing distress      She has stopped taking clonazepam and has had worsening of her anxiety.      Denies Symptoms of Panic Disorder: less recurrent panic attacks (palpitations/heart racing, sweating, shakiness, dyspnea, choking, chest pain/discomfort, Gi symptoms, dizzy/lightheadedness, hot/col flashes, paresthesias, derealization, fear of losing control or fear of dying), +precipitated or un-precipitated, source of worry and/or behavioral changes secondary, with or without agoraphobia    Panic attacks continue to be less often      I encouraged her to restart her clonazepam    Psychotherapy:  · Target symptoms: depression, anxiety   · Why chosen therapy is appropriate versus another modality: relevant to diagnosis  · Outcome monitoring methods: self-report, observation  · Therapeutic intervention type: behavior modifying psychotherapy, supportive psychotherapy  · Topics discussed/themes: relationships difficulties, stress related to medical comorbidities, difficulty managing affect in interpersonal relationships, building skills sets for symptom management, symptom recognition  · The patient's response to the intervention is accepting. The patient's progress toward treatment goals is limited.   · Duration of intervention: 16 minutes.    We discussed many psychosocial stressors in a supportive way.     Review of Systems   · PSYCHIATRIC: Pertinant items are noted in the narrative.  · CONSTITUTIONAL: No weight gain or loss.   · MUSCULOSKELETAL: has chronic pain from fibromyalgia worsening this appointment  -- continued  · NEUROLOGIC: No weakness, sensory changes, seizures, confusion, memory loss, tremor or other abnormal movements.  · RESPIRATORY: No shortness of breath.  · CARDIOVASCULAR: No tachycardia or chest pain.  · GASTROINTESTINAL: No nausea, vomiting, pain, constipation or diarrhea.  · GENITOURINARY: No frequency, dysuria or sexual dysfunction.  · HEMATOLOGIC/LYMPHATIC: No excessive bleeding, prolonged or excessive bleeding after dental extraction/injury.    Past Medical, Family and Social History: The patient's past medical, family and social history have been reviewed and updated as appropriate within the electronic medical record - see encounter notes.    Compliance: yes    Side effects: None    Risk Parameters:  Patient reports no suicidal ideation  Patient reports no homicidal ideation  Patient reports no self-injurious behavior  Patient reports no violent behavior    Exam (detailed: at least 9 elements; comprehensive: all 15 elements)   Constitutional  Vitals:  Most recent vital signs, dated less than 90 days prior to this appointment, were reviewed.   There were no vitals filed for this visit.     General:  unremarkable, age appropriate     Musculoskeletal  Muscle Strength/Tone:  no dystonia   Gait & Station:  non-ataxic     Psychiatric  Speech:  no latency; no press   Mood & Affect:  anxious, depressed  congruent and appropriate   Thought Process:  normal and logical   Associations:  intact   Thought Content:  normal, no suicidality, no homicidality, delusions, or paranoia   Insight:  has awareness of illness   Judgement: behavior is adequate to circumstances   Orientation:  grossly intact   Memory: intact for content of interview   Language: grossly intact   Attention Span & Concentration:  able to focus   Fund of Knowledge:  intact and appropriate to age and level of education     Assessment and Diagnosis   Status/Progress: Based on the examination today, the patient's problem(s) is/are worsening.   New problems have not been presented today.   Co-morbidities are complicating management of the primary condition.  There are no active rule-out diagnoses for this patient at this time.     General Impression:       ICD-10-CM ICD-9-CM   1. Moderate episode of recurrent major depressive disorder F33.1 296.32   2. ALFREDA (generalized anxiety disorder) F41.1 300.02   3. Fibromyalgia syndrome M79.7 729.1       Intervention/Counseling/Treatment Plan   · Medication Management: The risks and benefits of medication were discussed with the patient. Increase Cymbalta 120mg QDay for depression / fibromyalgia, Continue Adderall 10mg BID for depression offlabel increase motivation  Continue Clonazepam 1mg QHS    ·  Counseling provided with patient as follows: importance of compliance with chosen treatment options was emphasized, risks and benefits of treatment options, including medications, were discussed with the patient    Patient feels subjectively worse but is also under a significant amount of stress.  Given the amount of stress she is under, her expectations should be adjusted.  As far as I can see, she is functioning better.  She has managed to get a court date, filing taxes, and caring for her grandchildren.      Return to Clinic: 2 weeks

## 2018-05-02 ENCOUNTER — INITIAL CONSULT (OUTPATIENT)
Dept: RHEUMATOLOGY | Facility: CLINIC | Age: 53
End: 2018-05-02
Payer: COMMERCIAL

## 2018-05-02 VITALS
DIASTOLIC BLOOD PRESSURE: 70 MMHG | HEIGHT: 67 IN | WEIGHT: 155.81 LBS | SYSTOLIC BLOOD PRESSURE: 122 MMHG | HEART RATE: 93 BPM | BODY MASS INDEX: 24.45 KG/M2

## 2018-05-02 DIAGNOSIS — M79.7 FIBROMYALGIA: Primary | ICD-10-CM

## 2018-05-02 PROCEDURE — 99204 OFFICE O/P NEW MOD 45 MIN: CPT | Mod: S$GLB,,, | Performed by: INTERNAL MEDICINE

## 2018-05-02 PROCEDURE — 99999 PR PBB SHADOW E&M-EST. PATIENT-LVL III: CPT | Mod: PBBFAC,,, | Performed by: INTERNAL MEDICINE

## 2018-05-02 RX ORDER — CYCLOBENZAPRINE HCL 5 MG
5-10 TABLET ORAL 3 TIMES DAILY PRN
Qty: 90 TABLET | Refills: 5 | Status: SHIPPED | OUTPATIENT
Start: 2018-05-02 | End: 2018-06-01

## 2018-05-02 NOTE — LETTER
May 2, 2018      Anthony Maciel MD  [address] [address]           [facility] - Rheumatology  [address]  [place] [address]  Phone: [phone]  Fax: [phone]          Patient: Aleah Hagan   MR Number: [id number]   YOB: 1965   Date of Visit: 5/2/2018       Dear Dr. Anthony Maciel:    Thank you for referring Aleah Hagan to me for evaluation. Attached you will find relevant portions of my assessment and plan of care.    If you have questions, please do not hesitate to call me. I look forward to following Aleha Hagan along with you.    Sincerely,    Darius Falk MD    Enclosure  CC:  No Recipients    If you would like to receive this communication electronically, please contact [email or web address] or [phone] to request more information on [facility] access.    For providers and/or their staff who would like to refer a patient to [facility], please contact us through our one-stop-shop provider referral line, [facility], at [phone].    If you feel you have received this communication in error or would no longer like to receive these types of communications, please e-mail [email or web address]

## 2018-05-02 NOTE — PROGRESS NOTES
Chief Complaint   Patient presents with    Fibromyalgia    Disease Management       Patient was referred by     History of presenting illness    52 year old female has fibromyalgia and mild lumbar facet arthropathy    She sees psychiatry for anxiety and depression    She has seen  a while ago  He ruled out rheumatoid arthritis    Her whole body hurts a lot  She used to exercise faithfully  She got really depressed and gained weight  She lost weight s/p sleeve  Now she has severe pain and she cannot exercise    All her joints hurt  Back of the buttocks,hips hurt the worse    Once a week she almost loses consciousness for 2 hours  She says the pain does it to her   She says the severe pain makes her sit down  She thinks she is blacked out,when her kids talk to her she answers back,they say she doesn't make sense   When she wakes up from the episode she yawns a lot and then becomes so tired that goes to sleep for 6 hour straight    Medications     Pamelor : she never took it   Flexeril : helps her loosen up,took it bed time and it helps her sleep 5 hours   Lyrica : made her foggy  It didn't help 50 mg tid dose  Cymbalta 60 mg bid  Gabapentin didn't help at all,100 mg tid only   Elavil 25 mg   Takes klonipin for anxiety and aderral for ADHD    She likes to be in the sun  In the house she feels depressed and cannot function  When she does yard work she feels great  She cannot play with her grandchild    She has a divorce coming up in June    She stretches and that helps     Cant sleep  Goes to bed at 1 am and she wakes up at 4 am  Pain disturbs her   ambien gives her sleep for 3 hours    Past history : as above    Family history : none relevant     Social history: not an alcoholic and smoker       Review of Systems   Constitutional: Negative for activity change, appetite change, chills, diaphoresis, fatigue, fever and unexpected weight change.   HENT: Negative for congestion, dental problem,  drooling, ear discharge, ear pain, facial swelling, hearing loss, mouth sores, nosebleeds, postnasal drip, rhinorrhea, sinus pain, sinus pressure, sneezing, sore throat, tinnitus, trouble swallowing and voice change.    Eyes: Negative for photophobia, pain, discharge, redness, itching and visual disturbance.   Respiratory: Negative for apnea, cough, choking, chest tightness, shortness of breath, wheezing and stridor.    Cardiovascular: Negative for chest pain, palpitations and leg swelling.   Gastrointestinal: Positive for constipation. Negative for abdominal distention, abdominal pain, anal bleeding, blood in stool, diarrhea, nausea, rectal pain and vomiting.   Endocrine: Negative for cold intolerance, heat intolerance, polydipsia, polyphagia and polyuria.   Genitourinary: Positive for dysuria. Negative for decreased urine volume, difficulty urinating, enuresis, flank pain, frequency, genital sores, hematuria and urgency.   Musculoskeletal: Negative for arthralgias, back pain, gait problem, joint swelling, myalgias, neck pain and neck stiffness.   Skin: Negative for color change, pallor, rash and wound.   Allergic/Immunologic: Negative for environmental allergies, food allergies and immunocompromised state.   Neurological: Negative for dizziness, tremors, seizures, syncope, facial asymmetry, speech difficulty, weakness, light-headedness, numbness and headaches.   Hematological: Negative for adenopathy. Bruises/bleeds easily.   Psychiatric/Behavioral: Negative for agitation, behavioral problems, confusion, decreased concentration, dysphoric mood, hallucinations, self-injury, sleep disturbance and suicidal ideas. The patient is not nervous/anxious and is not hyperactive.      Physical Exam     BETTENCOURT-28 tender joint count: 0  BETTENCOURT-28 swollen joint count: 0    Normal ROM in all joints    Physical Exam   Constitutional: She is oriented to person, place, and time and well-developed, well-nourished, and in no distress. No  distress.   HENT:   Head: Normocephalic.   Mouth/Throat: Oropharynx is clear and moist.   Eyes: Conjunctivae are normal. Pupils are equal, round, and reactive to light. Right eye exhibits no discharge. Left eye exhibits no discharge. No scleral icterus.   Neck: Normal range of motion. No thyromegaly present.   Cardiovascular: Normal rate, regular rhythm, normal heart sounds and intact distal pulses.    Pulmonary/Chest: Effort normal and breath sounds normal. No stridor.   Abdominal: Soft. Bowel sounds are normal.   Lymphadenopathy:     She has no cervical adenopathy.   Neurological: She is alert and oriented to person, place, and time.   Skin: Skin is warm. No rash noted. She is not diaphoretic.     Psychiatric: Affect and judgment normal.   Musculoskeletal: Normal range of motion.         Assessment        52 year old female has fibromyalgia for many years  She has lumbar spine arthritis  Anxiety and depression  Insomnia     She has been under the care of neurology for all these years and since her medications were not helping she wanted to be evaluated here  Years ago she has seen  and has been told not to have rheumatoid arthritis     Today she also mentions she passes out for 2 to 3 hours at times and she is not aware of the surroundings,she says once she wakes up from the episode she is very tired and she wants to sleep for 6 hours straight    She seems very depressed,she hates [place],she hates indoors and always wants to be outside and in the sun to feel better  She does lot more yard work that indoor work    1. Fibromyalgia        New problem     Plan    Given the fact that she likes to manage her own medications,told her that she has tried many medications at very low doses and hence she might not have seen any benefit  She might want to pick one medication and give it a good try at the maximum dose and if she doesn't benefit or develop side effects we can change the medication    Sleep clinic  referral  Has mild sleep apnea but not evaluated for insomnia or any sleep d/o  Wonder if the 3 hours of passing out is due to a sleep d/o?     Discuss the passing out episodes with neurology,get EEG,make sure she has no seizures     She decided to take flexeril,gave her 5 mg tablets,told her max dose is 30 mg daily and she titrates all the way from 5 to any dose she tolerates and benefits from,but not exceed 30 mg   She agrees    Water aerobics and deep tissue massages encouraged     Psychiatry management of depression and anxiety  She claims benefit from cymbalta and klonipin    Aleah D was seen today for fibromyalgia and disease management.    Diagnoses and all orders for this visit:    Fibromyalgia  -     Ambulatory Referral to Physical/Occupational Therapy  -     Ambulatory consult to Sleep Disorders    Other orders  -     cyclobenzaprine (FLEXERIL) 5 MG tablet; Take 1-2 tablets (5-10 mg total) by mouth 3 (three) times daily as needed for Muscle spasms.        rtc in 3 months

## 2018-05-03 ENCOUNTER — TELEPHONE (OUTPATIENT)
Dept: NEUROLOGY | Facility: CLINIC | Age: 53
End: 2018-05-03

## 2018-05-03 ENCOUNTER — OFFICE VISIT (OUTPATIENT)
Dept: PSYCHIATRY | Facility: CLINIC | Age: 53
End: 2018-05-03
Attending: PSYCHIATRY & NEUROLOGY
Payer: COMMERCIAL

## 2018-05-03 VITALS
BODY MASS INDEX: 24.69 KG/M2 | DIASTOLIC BLOOD PRESSURE: 78 MMHG | HEART RATE: 73 BPM | WEIGHT: 157.31 LBS | HEIGHT: 67 IN | SYSTOLIC BLOOD PRESSURE: 127 MMHG | RESPIRATION RATE: 18 BRPM

## 2018-05-03 DIAGNOSIS — F33.0 MILD EPISODE OF RECURRENT MAJOR DEPRESSIVE DISORDER: Primary | ICD-10-CM

## 2018-05-03 DIAGNOSIS — F41.1 GAD (GENERALIZED ANXIETY DISORDER): ICD-10-CM

## 2018-05-03 DIAGNOSIS — M79.7 FIBROMYALGIA SYNDROME: ICD-10-CM

## 2018-05-03 PROCEDURE — 3008F BODY MASS INDEX DOCD: CPT | Mod: CPTII,S$GLB,, | Performed by: PSYCHIATRY & NEUROLOGY

## 2018-05-03 PROCEDURE — 90833 PSYTX W PT W E/M 30 MIN: CPT | Mod: S$GLB,,, | Performed by: PSYCHIATRY & NEUROLOGY

## 2018-05-03 PROCEDURE — 99999 PR PBB SHADOW E&M-EST. PATIENT-LVL III: CPT | Mod: PBBFAC,,, | Performed by: PSYCHIATRY & NEUROLOGY

## 2018-05-03 PROCEDURE — 99213 OFFICE O/P EST LOW 20 MIN: CPT | Mod: S$GLB,,, | Performed by: PSYCHIATRY & NEUROLOGY

## 2018-05-03 RX ORDER — DEXTROAMPHETAMINE SACCHARATE, AMPHETAMINE ASPARTATE, DEXTROAMPHETAMINE SULFATE AND AMPHETAMINE SULFATE 2.5; 2.5; 2.5; 2.5 MG/1; MG/1; MG/1; MG/1
10 TABLET ORAL 2 TIMES DAILY
Qty: 60 TABLET | Refills: 0 | Status: SHIPPED | OUTPATIENT
Start: 2018-05-03 | End: 2018-05-25 | Stop reason: SDUPTHER

## 2018-05-03 NOTE — TELEPHONE ENCOUNTER
----- Message from Anthony Maciel MD sent at 5/3/2018  8:32 AM CDT -----   Please request the patient see me or Stella in the next few weeks to discuss a passing out spell she reported to rheumatology at her visit.    Anthony Maciel MD    ----- Message -----  From: Darius Falk MD  Sent: 5/2/2018   3:10 PM  To: Anthony Maciel MD

## 2018-05-03 NOTE — PROGRESS NOTES
"Outpatient Psychiatry Follow-Up Visit (MD/NP)    5/3/2018    Clinical Status of Patient:  Outpatient (Ambulatory)    Chief Complaint:  Aleah Hagan is a 52 y.o. female who presents today for follow-up of depression and anxiety.  Met with patient.      Interval History and Content of Current Session:  Interim Events/Subjective Report/Content of Current Session:     Patient saw her rheumatologist yesterday to discuss these dissociative episodes.  Her rheumatologist suggested that she might be having a seizure.  She describes feeling very tired afterward.  She never falls, has incontinence, and often knows that she is about to have an episode.  We discussed the possibility of pseudoseizures.  She describes the primary precipitant of these episodes as being pain.  "The dog barks and it feels like it is shocking me".  She would have these episodes once every 5-6 weeks but recently they are now once a week or once every other week.  She notes that her pain has been worse.      Current Medication  Clonazepam 0.5mg QHS - stopped taking this past month  Adderall XR 10mg BID (last filled in February 2018)  Cymbalta 120mg QDay    Exercise  Patient explains that she used to be in really good shape but her muscles have wasted since her pain has worsened.  She is not exercising at all except for caring for her grandchild.      Psychosocial     She continues to have trouble with her .  Her ex-best friend is in a relationship with her .  She has been working on things with her .  Her court date is in June.      Improving Symptoms of Depression: no diminished mood or improved loss of interest/anhedonia; irritability, varied diminished energy, +change in sleep, +change in appetite, improving diminished concentration or cognition or indecisiveness, PMA/R, less excessive guilt or hopelessness or worthlessness, denies suicidal ideations    She is spending less time on her iPAD.  She is watching her " granddaughter which gives her things to do.       Varied Changes in Sleep: less trouble with initiation, +maintenance, +early morning awakening with inability to return to sleep, hypersomnolence     She is still sleeping 4-5 hours.  Her sleep is greatly affected by her pain.       Denies Suicidal/Homicidal ideations: no active/passive ideations, organized plans, future intentions     No psychosis or soraya     Improving Symptoms of ALFREDA: less excessive anxiety/worry/fear, +more days than not, +about numerous issues, +difficult to control, +with restlessness, +fatigue, less poor concentration, +irritability, +muscle tension, +sleep disturbance; less causes functionally impairing distress      Taking clonazepam only seldom.      Denies Symptoms of Panic Disorder: less recurrent panic attacks (palpitations/heart racing, sweating, shakiness, dyspnea, choking, chest pain/discomfort, Gi symptoms, dizzy/lightheadedness, hot/col flashes, paresthesias, derealization, fear of losing control or fear of dying), +precipitated or un-precipitated, source of worry and/or behavioral changes secondary, with or without agoraphobia    Panic attacks continue to be less often    Psychotherapy:  · Target symptoms: depression, anxiety   · Why chosen therapy is appropriate versus another modality: relevant to diagnosis  · Outcome monitoring methods: self-report, observation  · Therapeutic intervention type: behavior modifying psychotherapy, supportive psychotherapy  · Topics discussed/themes: relationships difficulties, stress related to medical comorbidities, difficulty managing affect in interpersonal relationships, building skills sets for symptom management, symptom recognition  · The patient's response to the intervention is accepting. The patient's progress toward treatment goals is limited.   · Duration of intervention: 20 minutes.    We discussed many psychosocial stressors in a supportive way. We discussed coping with her pain.   "    Review of Systems   · PSYCHIATRIC: Pertinant items are noted in the narrative.  · CONSTITUTIONAL: No weight gain or loss.   · MUSCULOSKELETAL: has chronic pain from fibromyalgia worsening this appointment -- continued  · NEUROLOGIC: possible pseudoseizures  · RESPIRATORY: No shortness of breath.  · CARDIOVASCULAR: No tachycardia or chest pain.  · GASTROINTESTINAL: No nausea, vomiting, pain, constipation or diarrhea.  · GENITOURINARY: No frequency, dysuria or sexual dysfunction.  · HEMATOLOGIC/LYMPHATIC: No excessive bleeding, prolonged or excessive bleeding after dental extraction/injury.    Past Medical, Family and Social History: The patient's past medical, family and social history have been reviewed and updated as appropriate within the electronic medical record - see encounter notes.    Compliance: yes    Side effects: None    Risk Parameters:  Patient reports no suicidal ideation  Patient reports no homicidal ideation  Patient reports no self-injurious behavior  Patient reports no violent behavior    Exam (detailed: at least 9 elements; comprehensive: all 15 elements)   Constitutional  Vitals:  Most recent vital signs, dated less than 90 days prior to this appointment, were reviewed.   Vitals:    05/03/18 1422   BP: 127/78   Pulse: 73   Resp: 18   Weight: 71.3 kg (157 lb 4.8 oz)   Height: 5' 7" (1.702 m)        General:  unremarkable, age appropriate     Musculoskeletal  Muscle Strength/Tone:  no dystonia   Gait & Station:  non-ataxic     Psychiatric  Speech:  no latency; no press   Mood & Affect:  steady, euthymic  congruent and appropriate   Thought Process:  normal and logical   Associations:  intact   Thought Content:  normal, no suicidality, no homicidality, delusions, or paranoia   Insight:  has awareness of illness   Judgement: behavior is adequate to circumstances   Orientation:  grossly intact   Memory: intact for content of interview   Language: grossly intact   Attention Span & Concentration:  " able to focus   Fund of Knowledge:  intact and appropriate to age and level of education     Assessment and Diagnosis   Status/Progress: Based on the examination today, the patient's problem(s) is/are somewhat improving.  New problems have been presented today.   Co-morbidities are complicating management of the primary condition.  There are no active rule-out diagnoses for this patient at this time.     General Impression:       ICD-10-CM ICD-9-CM   1. Mild episode of recurrent major depressive disorder F33.0 296.31   2. Fibromyalgia syndrome M79.7 729.1   3. ALFREDA (generalized anxiety disorder) F41.1 300.02       Intervention/Counseling/Treatment Plan   · Medication Management: The risks and benefits of medication were discussed with the patient. Increase Cymbalta 120mg QDay for depression / fibromyalgia, Continue Adderall 10mg BID for depression offlabel increase motivation  Continue Clonazepam 1mg QHS    ·  Counseling provided with patient as follows: importance of compliance with chosen treatment options was emphasized, risks and benefits of treatment options, including medications, were discussed with the patient    Patient with possible pseudoseizures vs dissociation.      Return to Clinic: 1 month

## 2018-05-09 ENCOUNTER — OFFICE VISIT (OUTPATIENT)
Dept: NEUROLOGY | Facility: CLINIC | Age: 53
End: 2018-05-09
Payer: COMMERCIAL

## 2018-05-09 VITALS
HEART RATE: 88 BPM | BODY MASS INDEX: 24.9 KG/M2 | RESPIRATION RATE: 16 BRPM | SYSTOLIC BLOOD PRESSURE: 122 MMHG | HEIGHT: 67 IN | DIASTOLIC BLOOD PRESSURE: 88 MMHG | WEIGHT: 158.63 LBS

## 2018-05-09 DIAGNOSIS — M79.7 FIBROMYALGIA: ICD-10-CM

## 2018-05-09 DIAGNOSIS — R40.4 TRANSIENT ALTERATION OF AWARENESS: Primary | ICD-10-CM

## 2018-05-09 DIAGNOSIS — M79.18 MYOFASCIAL PAIN: ICD-10-CM

## 2018-05-09 PROCEDURE — 99999 PR PBB SHADOW E&M-EST. PATIENT-LVL III: CPT | Mod: PBBFAC,,, | Performed by: NURSE PRACTITIONER

## 2018-05-09 PROCEDURE — 3008F BODY MASS INDEX DOCD: CPT | Mod: CPTII,S$GLB,, | Performed by: NURSE PRACTITIONER

## 2018-05-09 PROCEDURE — 99214 OFFICE O/P EST MOD 30 MIN: CPT | Mod: SA,S$GLB,, | Performed by: NURSE PRACTITIONER

## 2018-05-09 NOTE — PROGRESS NOTES
"HPI: Aleah Hagan is a 52 y.o. female with  Fibromyalgia and mild  Lumbar facet arthropathy with multiple areas of pain.      Patient presents today to discuss TAA episodes, reported at Rheumatology visit.     When she is experiencing an excessive amount of pain after increased physical activity, such as after working in her yard, she experiences greying out of her vision. She then sits on the couch, and gets "knocked out" for 2-3 hours. She is not aware of anything during these episodes. Near the end of her episodes, if her children speak to her or ask her a question, she knows what she wants to say; however, the words that come out of her mouth do not make sense. No loss of bowel or bladder function. She feels tired for the rest of the day after these episodes, and sleeps for 6 hours in the evening, which is excessive for her, as she normally sleeps only 3-4 hours each night, secondary to pain.     She is having 1-2 episodes each week, depending on how much work she does around the house and how much pain she experiences. Her divorce is pending, and her episodes have increased recently.     She had one episode one year ago when she woke up in the parking lot at [employer].    She continues to see Dr. White for her mood issues. In reviewing his note, pseudoseizures were in his differential diagnosis for her, given her possible dissociative episodes.     FM overall unchanged. She had cervical TPI's in 11/2017, which were ineffective. She had a great deal of stress at the time, and feels that it was ineffective, due to this.     Review of Systems   Constitutional: Negative for fever.   HENT: Negative for nosebleeds.    Eyes: Negative for double vision.   Respiratory: Negative for hemoptysis.    Cardiovascular: Negative for leg swelling.   Gastrointestinal: Negative for blood in stool.   Genitourinary: Negative for hematuria.   Musculoskeletal: Positive for back pain, myalgias and neck pain.   Skin: Negative " for rash.   Neurological: Positive for seizures and loss of consciousness. Negative for tremors.   Psychiatric/Behavioral: Positive for depression and memory loss. The patient is nervous/anxious and has insomnia.      Exam:  Gen Appearance, well developed/nourished in no apparent distress  CV: 2+ distal pulses with no edema or swelling  Neuro:  MS: Awake, alert, Sustains attention..  Recent/remote memory intact, Language is full to spontaneous speech/comprehension. Fund of Knowledge is full.   Mood is euthymic  CN: Optic discs are flat with normal vasculature, PERRL, Extraoccular movements and visual fields are full. Normal facial sensation and strength, Hearing symmetric, Tongue and Palate are midline and strong. Shoulder Shrug symmetric and strong.  Motor: Normal bulk, tone, no abnormal movements. 5/5 strength bilateral upper/lower extremities with 2+ reflexes  Sensory: symmetric to  Temp,  and vibration Romberg negative  Cerebellar: Heal to shin, Finger to nose, Rapid alternating movements intact  Gait: Normal stance, no ataxia  Multiple tender points.     Imaging 10/1/13 MRI brain:  IMPRESSION:   UNREMARKABLE MRI OF THE BRAIN SPECIFICALLY WITHOUT   EVIDENCE FOR ACUTE INFARCTION OR ENHANCING LESION.   CLINICAL CORRELATION AND FURTHER EVALUATION AS   WARRANTED.     CT head 1/2018: Unremarkable noncontrast CT head specifically without evidence for acute intracranial hemorrhage. Further evaluation as warrented clinically.    Assessment/Plan: Aleah Hagan is a 52 y.o. female with  Fibromyalgia and mild  Lumbar facet arthropathy (by MRI L spine 2012) with multiple areas of pain.      I recommend:  1. Episodes suggestive of pseudoseizures; however, I will check an EEG to assess for cortical irritability. If unremarkable, I will follow with an ambulatory EEG in an attempt to capture one of her episodes.   2. Diagnosed with FM per her second Rheumatology consult, who suggested Flexeril, massage, aqua aerobics.   3.  Check a TSH and Vitamin D level today. She is on supplementation for Vitamin D deficiency.   4.  She continues treatment for anxiety and depression with psychiatry, Dr White and is using cymbalta  5.  Her fibromyalgia pain continues. She is only taking Cymbalta and Flexeril for this. Flexeril  D/c'ed due to concern for sedation by psychiatry prior. Unsure if Lyrica helped prior, but couldn't tolerate doses higher than 50 mg bid. Gabapentin ineffective prior. She failed cervical TPI's prior, but is unsure if this was ineffective, due to her level of stress at the time.   6.  For fibromyalgia pain. No relief with TPIs, Further Pamelor will be avoided given her psychiatric treatment, ongoing, Neuropathic cream given for fibro pain not helpful. Elavil worsened constipation. Had some relief with dry needling prior.   7. Back brace ordered. Consider repeating imaging if any alarming symptoms like lumbar radicular pain.     RTC 3 months

## 2018-05-16 ENCOUNTER — OFFICE VISIT (OUTPATIENT)
Dept: SLEEP MEDICINE | Facility: CLINIC | Age: 53
End: 2018-05-16
Payer: COMMERCIAL

## 2018-05-16 VITALS
WEIGHT: 155.19 LBS | HEART RATE: 96 BPM | BODY MASS INDEX: 24.36 KG/M2 | HEIGHT: 67 IN | SYSTOLIC BLOOD PRESSURE: 115 MMHG | DIASTOLIC BLOOD PRESSURE: 80 MMHG

## 2018-05-16 DIAGNOSIS — G47.30 SLEEP APNEA, UNSPECIFIED TYPE: Primary | ICD-10-CM

## 2018-05-16 PROCEDURE — 3008F BODY MASS INDEX DOCD: CPT | Mod: CPTII,S$GLB,, | Performed by: NURSE PRACTITIONER

## 2018-05-16 PROCEDURE — 99999 PR PBB SHADOW E&M-EST. PATIENT-LVL III: CPT | Mod: PBBFAC,,, | Performed by: NURSE PRACTITIONER

## 2018-05-16 PROCEDURE — 99203 OFFICE O/P NEW LOW 30 MIN: CPT | Mod: SA,S$GLB,, | Performed by: NURSE PRACTITIONER

## 2018-05-16 NOTE — PROGRESS NOTES
Aleah Hagan  was seen as a new patient for the evaluation of obstructive sleep apnea.    CHIEF COMPLAINT:    Chief Complaint   Patient presents with    Sleep Apnea    Fatigue       05/16/2018 CLAUDIA Haile NP: Initial HISTORY OF PRESENT ILLNESS: Aleah Hagan is a 52 y.o. female is here for sleep evaluation.       Patient complaints include: interrupted sleep and daytime sleepiness.     Reports difficulty initiating sleep and maintaining sleep  Sleep maintenance especially difficult due to sleep disturbances from chronic pain - requires a lot of repositioning at night  Used to take Ambien which helped initiate sleep, but would cause her to not move because she is sleeping well then would wake up in excruciating pain 3 hours later from not moving. Stopped use due to this    Sees psychiatry, rheumatology, and neurology    Had PSG in 2012/2013 done for bariatric surgery work up at [facility]. Per pt it was mild and didn't treat it specifically. No records for my review today.   Had bariatric surgery in 2013. Has maintained 80 lb weight loss    Denies symptoms of restless legs or kicking during sleep. Multiple leg readjustments at night secondary to pain.       ESS 16    SLEEP ROUTINE:    Drinks coffee before bedtime as it relaxes her    Sleep Clinic New Patient 5/16/2018   What time do you go to bed on a week day? (Give a range) 12:00am -1230   What time do you go to bed on a day off? (Give a range) 1:00am   How long does it take you to fall asleep? (Give a range) 1 hours   How long does it take you to fall back into sleep? (Give a range) 20 minutes   What time do you wake up to start your day on a week day? (Give a range) 4:30am    What time do you wake up to start your day on a day off? (Give a range) 7:00am    What time do you get out of bed? (Give a range) 6:30am    Rate your sleep quality from 0 to 5 (0-poor, 5-great). 2   Have you experienced:  N/a   Have you ever had a sleep study/CPAP machine/surgery  for sleep apnea? Yes   Have you ever had a CPAP machine for sleep apnea? No   Have you ever had surgery for sleep apnea? No       Baseline Sleep Study: NIEVES signed to obtain [id number] PSG pre-bariatric surgery     PAST MEDICAL HISTORY:    Active Ambulatory Problems     Diagnosis Date Noted    Fibromyalgia 2013    ANH III (cervical intraepithelial neoplasia grade III) with severe dysplasia 02/15/2016    Severe episode of recurrent major depressive disorder, without psychotic features 2017    ALFREDA (generalized anxiety disorder) 2017    Myofascial pain 2017    Transient alteration of awareness 2018     Resolved Ambulatory Problems     Diagnosis Date Noted    No Resolved Ambulatory Problems     Past Medical History:   Diagnosis Date    Abnormal Pap smear of cervix 2015    Arthritis     ANH II (cervical intraepithelial neoplasia II)     Depression     Disc degeneration     Fibromyalgia     Hx of psychiatric care     Psychiatric problem     Synovial cyst of lumbar spine     Therapy                 PAST SURGICAL HISTORY:    Past Surgical History:   Procedure Laterality Date    APPENDECTOMY      age 22    BREAST SURGERY  2014    augmentation    CERVICAL BIOPSY  W/ LOOP ELECTRODE EXCISION  16    ANH 1     SECTION, CLASSIC      x2    CHOLECYSTECTOMY  10/2013    gastric sleeve  13    HYSTERECTOMY  2017    [facility]- cervical dysp    NECK SURGERY      cosmetic surgery    TONSILLECTOMY, ADENOIDECTOMY  age 9         FAMILY HISTORY:                Family History   Problem Relation Age of Onset    Lung cancer Mother     Lung cancer Father     Thyroid cancer Sister     Ovarian cancer Neg Hx     Breast cancer Neg Hx     Colon cancer Neg Hx        SOCIAL HISTORY:          Tobacco:   History   Smoking Status    Current Every Day Smoker    Packs/day: 0.30    Years: 10.00    Types: Cigarettes    Start date: 1984   Smokeless Tobacco     "Never Used     Comment: stopped for 23 years and then started 2 years ago again        Alcohol use:    History   Alcohol Use    Yes     Comment: Socially-2 times a month on the weekend-3 drinks each day                  ALLERGIES:    Review of patient's allergies indicates:   Allergen Reactions    Codeine Hives    Shrimp Hives       CURRENT MEDICATIONS:    Current Outpatient Prescriptions   Medication Sig Dispense Refill    clonazePAM (KLONOPIN) 1 MG tablet Take 1 tablet (1 mg total) by mouth every evening. 30 tablet 0    cyanocobalamin, vitamin B-12, 5,000 mcg TbDL Take 1 tablet by mouth once daily.      cyclobenzaprine (FLEXERIL) 5 MG tablet Take 1-2 tablets (5-10 mg total) by mouth 3 (three) times daily as needed for Muscle spasms. 90 tablet 5    dextroamphetamine-amphetamine 10 mg Tab Take 10 mg by mouth 2 (two) times daily. 60 tablet 0    DULoxetine (CYMBALTA) 60 MG capsule Take 2 capsules (120 mg total) by mouth once daily. 60 capsule 1    multivitamin capsule Take 1 capsule by mouth once daily.      omeprazole (PRILOSEC) 40 MG capsule Take 1 capsule (40 mg total) by mouth once daily. 30 capsule 2     No current facility-administered medications for this visit.                   REVIEW OF SYSTEMS:     Sleep related symptoms as per HPI.  Sleep Clinic ROS  5/16/2018   Difficulty breathing through the nose?  No   Sore throat or dry mouth in the morning? No   Irregular or very fast heart beat?  No   Shortness of breath?  No   Acid reflux? Sometimes   Body aches and pains?  Yes   Morning headaches? No   Dizziness? No   Mood changes?  No   Do you exercise?  No   Do you feel like moving your legs a lot?  Yes       Otherwise, a balance of systems reviewed is negative.          PHYSICAL EXAM:  Vitals:    05/16/18 1007   BP: 115/80   Pulse: 96   Weight: 70.4 kg (155 lb 3.3 oz)   Height: 5' 7" (1.702 m)   PainSc:   7     Body mass index is 24.31 kg/m².     GENERAL: Normal development, well groomed  HEENT:  " Conjunctivae are non-erythematous; Pupils equal, round, and reactive to light; Nose is symmetrical; Nasal mucosa is pink and moist; Septum is midline; Inferior turbinates are normal; Nasal airflow is normal; Posterior pharynx is pink; Modified Mallampati: III; Posterior palate is normal; Tonsils +1; Uvula is normal and pink;Tongue is normal; Dentition is fair; No TMJ tenderness; Jaw opening and protrusion without click and without discomfort.  NECK: Supple. Neck circumference is 11.5 inches. No thyromegaly. No palpable nodes.    SKIN: On face and neck: No abrasions, no rashes, no lesions.  No subcutaneous nodules are palpable.  RESPIRATORY: Chest is clear to auscultation.  Normal chest expansion and non-labored breathing at rest.  CARDIOVASCULAR: Normal S1, S2.  No murmurs, gallops or rubs. No carotid bruits bilaterally.  EXTREMITIES: No edema. No clubbing. No cyanosis. Station normal. Gait normal.        NEURO/PSYCH: Oriented to time, place and person. Normal attention span and concentration. Affect is full. Mood is normal.                                              ASSESSMENT:    CAMMIE, previously diagnosed, severity unknown; has had bariatric surgery with 80 lb weight loss since baseline PSG. The patient symptomatically has interrupted sleep and daytime sleepiness with findings of crowded oral airway. Medical co-morbidities: dissociative episodes (sees psych, on treatment), depressoin, anxiety, and fibromyalgia. This warrants re-evaluation of obstructive sleep apnea.      Hx tonsillectomy    Hx gastric bypass surgery, 2013    PLAN:    Diagnostic: Polysomnogram @ [facility]. The nature of this procedure and its indication was discussed with the patient. If not PSG, then HST. Discussed with patient that if HST is negative or depending on severity of positive HST, in-lab sleep study may be necessary.  Patient will be contacted after sleep study is done - email results via [facility] and discuss via phone prn, pt lives  1.5 hours away.   NIEVES signed to obtain baseline PSG.     Education: During our discussion today, we talked about the etiology of obstructive sleep apnea as well as the potential ramifications of untreated sleep apnea, which could include daytime sleepiness, hypertension, heart disease and/or stroke. We discussed potential treatment options, which could include weight loss, body positioning, continuous positive airway pressure (CPAP), OA, EPAP, or referral for surgical consideration.     Precautions: The patient was advised to abstain from driving should they feel sleepy  or drowsy.     Thank you for allowing me the opportunity to participate in the care of your patient.    ADDENDUM:     Received external sleep records from [facility], fax scanned under [facility]    08/16/2012  lb. AHI 14 with oxygen stefani 86%.

## 2018-05-17 ENCOUNTER — TELEPHONE (OUTPATIENT)
Dept: SLEEP MEDICINE | Facility: CLINIC | Age: 53
End: 2018-05-17

## 2018-05-25 ENCOUNTER — OFFICE VISIT (OUTPATIENT)
Dept: PSYCHIATRY | Facility: CLINIC | Age: 53
End: 2018-05-25
Attending: PSYCHIATRY & NEUROLOGY
Payer: COMMERCIAL

## 2018-05-25 VITALS
RESPIRATION RATE: 20 BRPM | DIASTOLIC BLOOD PRESSURE: 71 MMHG | SYSTOLIC BLOOD PRESSURE: 126 MMHG | WEIGHT: 156.75 LBS | HEIGHT: 67 IN | HEART RATE: 80 BPM | BODY MASS INDEX: 24.6 KG/M2

## 2018-05-25 DIAGNOSIS — F41.1 GAD (GENERALIZED ANXIETY DISORDER): ICD-10-CM

## 2018-05-25 DIAGNOSIS — F33.41 RECURRENT MAJOR DEPRESSIVE DISORDER, IN PARTIAL REMISSION: Primary | ICD-10-CM

## 2018-05-25 PROCEDURE — 3008F BODY MASS INDEX DOCD: CPT | Mod: CPTII,S$GLB,, | Performed by: PSYCHIATRY & NEUROLOGY

## 2018-05-25 PROCEDURE — 90833 PSYTX W PT W E/M 30 MIN: CPT | Mod: S$GLB,,, | Performed by: PSYCHIATRY & NEUROLOGY

## 2018-05-25 PROCEDURE — 99213 OFFICE O/P EST LOW 20 MIN: CPT | Mod: S$GLB,,, | Performed by: PSYCHIATRY & NEUROLOGY

## 2018-05-25 PROCEDURE — 99999 PR PBB SHADOW E&M-EST. PATIENT-LVL III: CPT | Mod: PBBFAC,,, | Performed by: PSYCHIATRY & NEUROLOGY

## 2018-05-25 RX ORDER — DEXTROAMPHETAMINE SACCHARATE, AMPHETAMINE ASPARTATE, DEXTROAMPHETAMINE SULFATE AND AMPHETAMINE SULFATE 2.5; 2.5; 2.5; 2.5 MG/1; MG/1; MG/1; MG/1
10 TABLET ORAL 2 TIMES DAILY
Qty: 60 TABLET | Refills: 0 | Status: SHIPPED | OUTPATIENT
Start: 2018-05-25 | End: 2018-06-28 | Stop reason: SDUPTHER

## 2018-05-25 RX ORDER — DULOXETIN HYDROCHLORIDE 60 MG/1
120 CAPSULE, DELAYED RELEASE ORAL DAILY
Qty: 60 CAPSULE | Refills: 1 | Status: SHIPPED | OUTPATIENT
Start: 2018-05-25 | End: 2018-06-29 | Stop reason: SDUPTHER

## 2018-05-25 RX ORDER — CLONAZEPAM 1 MG/1
1 TABLET ORAL NIGHTLY
Qty: 30 TABLET | Refills: 0 | Status: SHIPPED | OUTPATIENT
Start: 2018-05-25 | End: 2018-06-28 | Stop reason: SDUPTHER

## 2018-05-25 NOTE — PROGRESS NOTES
Outpatient Psychiatry Follow-Up Visit (MD/NP)    5/25/2018    Clinical Status of Patient:  Outpatient (Ambulatory)    Chief Complaint:  Aleah Hagan is a 52 y.o. female who presents today for follow-up of depression and anxiety.  Met with patient.      Interval History and Content of Current Session:  Interim Events/Subjective Report/Content of Current Session:     Patient saw neurology and it is thought that she is having pseudoseizures.  She is required to do a sleep study.  She is having an increasing amount of pseudoseizures 3-4 times per week for 20 minutes to an hour, prior they had lasted for much longer.  She has pseudoseizures more on days where she is hurting.      Current Medication  Clonazepam 0.5mg QHS - stopped taking this past month  Adderall XR 10mg BID (last filled in February 2018)  Cymbalta 120mg QDay    Exercise  Patient explains that she used to be in really good shape but her muscles have wasted since her pain has worsened.  She is not exercising at all except for caring for her grandchild.      Psychosocial    Her son is moving back in with her for a brief amount of time.  He court date for her divorce is June 18th.      Improving Symptoms of Depression: no diminished mood or improved loss of interest/anhedonia; irritability, varied diminished energy, +change in sleep, +change in appetite, improving diminished concentration or cognition or indecisiveness, PMA/R, less excessive guilt or hopelessness or worthlessness, denies suicidal ideations     Less crying episodes    Improved Changes in Sleep: less trouble with initiation, +maintenance, +early morning awakening with inability to return to sleep, hypersomnolence     Sleeping 7 hours per night now     Denies Suicidal/Homicidal ideations: no active/passive ideations, organized plans, future intentions     No psychosis or soraya     Improving Symptoms of ALFREDA: less excessive anxiety/worry/fear, +more days than not, +about numerous issues,  +difficult to control, +with restlessness, +fatigue, less poor concentration, +irritability, +muscle tension, +sleep disturbance; less causes functionally impairing distress      Taking clonazepam only seldom.      Denies Symptoms of Panic Disorder: less recurrent panic attacks (palpitations/heart racing, sweating, shakiness, dyspnea, choking, chest pain/discomfort, Gi symptoms, dizzy/lightheadedness, hot/col flashes, paresthesias, derealization, fear of losing control or fear of dying), +precipitated or un-precipitated, source of worry and/or behavioral changes secondary, with or without agoraphobia    Panic attacks continue to be less often    Psychotherapy:  · Target symptoms: depression, anxiety   · Why chosen therapy is appropriate versus another modality: relevant to diagnosis  · Outcome monitoring methods: self-report, observation  · Therapeutic intervention type: behavior modifying psychotherapy, supportive psychotherapy  · Topics discussed/themes: relationships difficulties, stress related to medical comorbidities, difficulty managing affect in interpersonal relationships, building skills sets for symptom management, symptom recognition  · The patient's response to the intervention is accepting. The patient's progress toward treatment goals is limited.   · Duration of intervention: 16 minutes.    We discussed many psychosocial stressors in a supportive way. We discussed coping with her pain.      Review of Systems   · PSYCHIATRIC: Pertinant items are noted in the narrative.  · CONSTITUTIONAL: No weight gain or loss.   · MUSCULOSKELETAL: has chronic pain from fibromyalgia worsening this appointment -- continued  · NEUROLOGIC: possible pseudoseizures  · RESPIRATORY: No shortness of breath.  · CARDIOVASCULAR: No tachycardia or chest pain.  · GASTROINTESTINAL: No nausea, vomiting, pain, constipation or diarrhea.  · GENITOURINARY: No frequency, dysuria or sexual dysfunction.  · HEMATOLOGIC/LYMPHATIC: No  "excessive bleeding, prolonged or excessive bleeding after dental extraction/injury.    Past Medical, Family and Social History: The patient's past medical, family and social history have been reviewed and updated as appropriate within the electronic medical record - see encounter notes.    Compliance: yes    Side effects: None    Risk Parameters:  Patient reports no suicidal ideation  Patient reports no homicidal ideation  Patient reports no self-injurious behavior  Patient reports no violent behavior    Exam (detailed: at least 9 elements; comprehensive: all 15 elements)   Constitutional  Vitals:  Most recent vital signs, dated less than 90 days prior to this appointment, were reviewed.   Vitals:    05/25/18 1241   BP: 126/71   Pulse: 80   Resp: 20   Weight: 71.1 kg (156 lb 12 oz)   Height: 5' 7" (1.702 m)        General:  unremarkable, age appropriate     Musculoskeletal  Muscle Strength/Tone:  no dystonia   Gait & Station:  non-ataxic     Psychiatric  Speech:  no latency; no press   Mood & Affect:  steady, euthymic  congruent and appropriate   Thought Process:  normal and logical   Associations:  intact   Thought Content:  normal, no suicidality, no homicidality, delusions, or paranoia   Insight:  has awareness of illness   Judgement: behavior is adequate to circumstances   Orientation:  grossly intact   Memory: intact for content of interview   Language: grossly intact   Attention Span & Concentration:  able to focus   Fund of Knowledge:  intact and appropriate to age and level of education     Assessment and Diagnosis   Status/Progress: Based on the examination today, the patient's problem(s) is/are improved.  New problems have been presented today.   Co-morbidities are complicating management of the primary condition.  There are no active rule-out diagnoses for this patient at this time.     General Impression:       ICD-10-CM ICD-9-CM   1. Recurrent major depressive disorder, in partial remission F33.41 " 296.35   2. ALFREDA (generalized anxiety disorder) F41.1 300.02       Intervention/Counseling/Treatment Plan   · Medication Management: The risks and benefits of medication were discussed with the patient. Increase Cymbalta 120mg QDay for depression / fibromyalgia, Continue Adderall 10mg BID for depression offlabel increase motivation  Continue Clonazepam 1mg QHS    Will attempt to decrease narcotics when able.  Patient just now truly responding and getting back to herself    ·  Counseling provided with patient as follows: importance of compliance with chosen treatment options was emphasized, risks and benefits of treatment options, including medications, were discussed with the patient    Patient with possible pseudoseizures vs dissociation.      Return to Clinic: 1 month

## 2018-05-30 DIAGNOSIS — R40.4 TRANSIENT ALTERATION OF AWARENESS: Primary | ICD-10-CM

## 2018-06-07 ENCOUNTER — TELEPHONE (OUTPATIENT)
Dept: NEUROLOGY | Facility: CLINIC | Age: 53
End: 2018-06-07

## 2018-06-07 NOTE — TELEPHONE ENCOUNTER
Not at this time. Her recent Head CT was unremarkable. If her ambulatory EEG is abnormal, I will order an MRI Brain to evaluate further.

## 2018-06-07 NOTE — TELEPHONE ENCOUNTER
----- Message from Karime Talbot sent at 2018  3:55 PM CDT -----  Contact: PATIENT  Aleah Hagan  MRN: [id number]  : 1965  PCP: Binu Burnett  Home Phone      [phone]  Work Phone      Not on file.  [facility]          [phone]      MESSAGE: Patient states that it was discussed for the patient to have an MRI but she has not seen anything come over [facility] and she is checking to see if Stella is still wanting it to be done.        Phone: [phone]

## 2018-06-14 ENCOUNTER — TELEPHONE (OUTPATIENT)
Dept: NEUROLOGY | Facility: CLINIC | Age: 53
End: 2018-06-14

## 2018-06-14 NOTE — TELEPHONE ENCOUNTER
----- Message from Karime Talbot sent at 2018  9:11 AM CDT -----  Contact: [facility]  Aleah Hagan  MRN: [id number]  : 1965  PCP: Binu Burnett  Home Phone      [phone]  Work Phone      Not on file.  [facility]          [phone]      MESSAGE: Melinda needs the progress note from 18 in order for them to get the process started for the back brace that Stella has ordered.  She states that it is time sensitive and this information ASAP for insurance purposes.          Phone: [phone]  Fax: [phone]

## 2018-06-25 ENCOUNTER — OUTSIDE PLACE OF SERVICE (OUTPATIENT)
Dept: SLEEP MEDICINE | Facility: CLINIC | Age: 53
End: 2018-06-25
Payer: COMMERCIAL

## 2018-06-25 ENCOUNTER — HOSPITAL ENCOUNTER (OUTPATIENT)
Dept: SLEEP MEDICINE | Facility: HOSPITAL | Age: 53
Discharge: HOME | End: 2018-06-25
Attending: NURSE PRACTITIONER
Payer: COMMERCIAL

## 2018-06-25 PROCEDURE — 95806 SLEEP STUDY UNATT&RESP EFFT: CPT

## 2018-06-26 PROCEDURE — 95800 SLP STDY UNATTENDED: CPT | Mod: 26,52,, | Performed by: INTERNAL MEDICINE

## 2018-06-28 NOTE — TELEPHONE ENCOUNTER
"Patient states Dr Maciel has her on Ambulatory EEG, she had a possible seizure this morning and is in a "brain fog". She forgot her appointment and was rescheduled for 7/13.   Patient is needing refills on her medication. She also wants to let you know she is doing just fine after her recent divorce.   "

## 2018-06-29 RX ORDER — CLONAZEPAM 1 MG/1
1 TABLET ORAL NIGHTLY
Qty: 30 TABLET | Refills: 0 | Status: SHIPPED | OUTPATIENT
Start: 2018-06-29 | End: 2018-07-13 | Stop reason: SDUPTHER

## 2018-06-29 RX ORDER — DEXTROAMPHETAMINE SACCHARATE, AMPHETAMINE ASPARTATE, DEXTROAMPHETAMINE SULFATE AND AMPHETAMINE SULFATE 2.5; 2.5; 2.5; 2.5 MG/1; MG/1; MG/1; MG/1
10 TABLET ORAL 2 TIMES DAILY
Qty: 60 TABLET | Refills: 0 | Status: SHIPPED | OUTPATIENT
Start: 2018-06-29 | End: 2018-07-13 | Stop reason: SDUPTHER

## 2018-06-29 RX ORDER — DEXTROAMPHETAMINE SACCHARATE, AMPHETAMINE ASPARTATE, DEXTROAMPHETAMINE SULFATE AND AMPHETAMINE SULFATE 2.5; 2.5; 2.5; 2.5 MG/1; MG/1; MG/1; MG/1
10 TABLET ORAL 2 TIMES DAILY
Qty: 60 TABLET | Refills: 0 | Status: CANCELLED | OUTPATIENT
Start: 2018-06-29

## 2018-06-29 RX ORDER — DULOXETIN HYDROCHLORIDE 60 MG/1
120 CAPSULE, DELAYED RELEASE ORAL DAILY
Qty: 60 CAPSULE | Refills: 1 | Status: SHIPPED | OUTPATIENT
Start: 2018-06-29 | End: 2018-07-13 | Stop reason: SDUPTHER

## 2018-07-05 ENCOUNTER — TELEPHONE (OUTPATIENT)
Dept: NEUROLOGY | Facility: CLINIC | Age: 53
End: 2018-07-05

## 2018-07-05 DIAGNOSIS — G47.33 OSA (OBSTRUCTIVE SLEEP APNEA): Primary | ICD-10-CM

## 2018-07-13 ENCOUNTER — OFFICE VISIT (OUTPATIENT)
Dept: PSYCHIATRY | Facility: CLINIC | Age: 53
End: 2018-07-13
Attending: PSYCHIATRY & NEUROLOGY
Payer: COMMERCIAL

## 2018-07-13 ENCOUNTER — DOCUMENTATION ONLY (OUTPATIENT)
Dept: NEUROLOGY | Facility: CLINIC | Age: 53
End: 2018-07-13

## 2018-07-13 VITALS
DIASTOLIC BLOOD PRESSURE: 89 MMHG | WEIGHT: 160.81 LBS | BODY MASS INDEX: 25.84 KG/M2 | HEART RATE: 98 BPM | SYSTOLIC BLOOD PRESSURE: 116 MMHG | RESPIRATION RATE: 20 BRPM | HEIGHT: 66 IN

## 2018-07-13 DIAGNOSIS — F41.1 GAD (GENERALIZED ANXIETY DISORDER): ICD-10-CM

## 2018-07-13 DIAGNOSIS — M79.7 FIBROMYALGIA SYNDROME: ICD-10-CM

## 2018-07-13 DIAGNOSIS — F33.41 RECURRENT MAJOR DEPRESSIVE DISORDER, IN PARTIAL REMISSION: Primary | ICD-10-CM

## 2018-07-13 PROCEDURE — 99213 OFFICE O/P EST LOW 20 MIN: CPT | Mod: S$GLB,,, | Performed by: PSYCHIATRY & NEUROLOGY

## 2018-07-13 PROCEDURE — 90833 PSYTX W PT W E/M 30 MIN: CPT | Mod: S$GLB,,, | Performed by: PSYCHIATRY & NEUROLOGY

## 2018-07-13 PROCEDURE — 3008F BODY MASS INDEX DOCD: CPT | Mod: CPTII,S$GLB,, | Performed by: PSYCHIATRY & NEUROLOGY

## 2018-07-13 PROCEDURE — 99999 PR PBB SHADOW E&M-EST. PATIENT-LVL III: CPT | Mod: PBBFAC,,, | Performed by: PSYCHIATRY & NEUROLOGY

## 2018-07-13 RX ORDER — DEXTROAMPHETAMINE SACCHARATE, AMPHETAMINE ASPARTATE, DEXTROAMPHETAMINE SULFATE AND AMPHETAMINE SULFATE 2.5; 2.5; 2.5; 2.5 MG/1; MG/1; MG/1; MG/1
10 TABLET ORAL 2 TIMES DAILY
Qty: 60 TABLET | Refills: 0 | Status: SHIPPED | OUTPATIENT
Start: 2018-08-13 | End: 2018-11-13 | Stop reason: SDUPTHER

## 2018-07-13 RX ORDER — DULOXETIN HYDROCHLORIDE 60 MG/1
120 CAPSULE, DELAYED RELEASE ORAL DAILY
Qty: 60 CAPSULE | Refills: 1 | Status: SHIPPED | OUTPATIENT
Start: 2018-07-13 | End: 2018-12-07 | Stop reason: SDUPTHER

## 2018-07-13 RX ORDER — CLONAZEPAM 1 MG/1
1 TABLET ORAL NIGHTLY
Qty: 30 TABLET | Refills: 1 | Status: SHIPPED | OUTPATIENT
Start: 2018-07-13 | End: 2018-10-22

## 2018-07-13 RX ORDER — DEXTROAMPHETAMINE SACCHARATE, AMPHETAMINE ASPARTATE, DEXTROAMPHETAMINE SULFATE AND AMPHETAMINE SULFATE 2.5; 2.5; 2.5; 2.5 MG/1; MG/1; MG/1; MG/1
10 TABLET ORAL 2 TIMES DAILY
Qty: 60 TABLET | Refills: 0 | Status: SHIPPED | OUTPATIENT
Start: 2018-07-13 | End: 2018-07-13 | Stop reason: SDUPTHER

## 2018-07-13 NOTE — PROGRESS NOTES
Outpatient Psychiatry Follow-Up Visit (MD/NP)    7/13/2018    Clinical Status of Patient:  Outpatient (Ambulatory)    Chief Complaint:  Aleah Hagan is a 52 y.o. female who presents today for follow-up of depression and anxiety.  Met with patient.      Interval History and Content of Current Session:  Interim Events/Subjective Report/Content of Current Session:     She had a sleep study and was told that she needs a cpap machine.  Her insurance did not want to cover the machine.      She continues to get possible pseudoseizures when she exerts herself.  She isn't having panic attacks.  She is awaiting EEG results in a few weeks.      Current Medication  Clonazepam 1mg QHS   Adderall XR 10mg BID (last filled in February 2018)  Cymbalta 120mg QDay    Exercise       Psychosocial    Her divorce is now finalized.  She has an upcoming court date in September that will finalize her spousal support.  She also got some back pay.  Her daughter is pregnant with her ex-boyfriends child.      Improving Symptoms of Depression: no diminished mood or improved loss of interest/anhedonia; irritability, varied diminished energy, improved change in sleep, +change in appetite, improving diminished concentration or cognition or indecisiveness, PMA/R, less excessive guilt or hopelessness or worthlessness, denies suicidal ideations     She isn't having crying spells anymore.  She explains that at times she does feel numb.      Improved Changes in Sleep: less trouble with initiation, +maintenance, +early morning awakening with inability to return to sleep, hypersomnolence     Sleeping 7 hours per night now     Denies Suicidal/Homicidal ideations: no active/passive ideations, organized plans, future intentions     No psychosis or soraya     Improving Symptoms of ALFREDA: less excessive anxiety/worry/fear, +more days than not, +about numerous issues, +difficult to control, +with restlessness, +fatigue, less poor concentration,  +irritability, +muscle tension, +sleep disturbance; less causes functionally impairing distress      Taking clonazepam only seldom.      Denies Symptoms of Panic Disorder: less recurrent panic attacks (palpitations/heart racing, sweating, shakiness, dyspnea, choking, chest pain/discomfort, Gi symptoms, dizzy/lightheadedness, hot/col flashes, paresthesias, derealization, fear of losing control or fear of dying), +precipitated or un-precipitated, source of worry and/or behavioral changes secondary, with or without agoraphobia    Panic attacks continue to be less often    Psychotherapy:  · Target symptoms: depression, anxiety   · Why chosen therapy is appropriate versus another modality: relevant to diagnosis  · Outcome monitoring methods: self-report, observation  · Therapeutic intervention type: behavior modifying psychotherapy, supportive psychotherapy  · Topics discussed/themes: relationships difficulties, stress related to medical comorbidities, difficulty managing affect in interpersonal relationships, building skills sets for symptom management, symptom recognition  · The patient's response to the intervention is accepting. The patient's progress toward treatment goals is limited.   · Duration of intervention: 22 minutes.    We discussed many psychosocial stressors in a supportive way. We discussed coping with her pain.      Review of Systems   · PSYCHIATRIC: Pertinant items are noted in the narrative.  · CONSTITUTIONAL: No weight gain or loss.   · MUSCULOSKELETAL: has chronic pain from fibromyalgia worsening this appointment -- continued  · NEUROLOGIC: possible pseudoseizures  · RESPIRATORY: No shortness of breath.  · CARDIOVASCULAR: No tachycardia or chest pain.  · GASTROINTESTINAL: No nausea, vomiting, pain, constipation or diarrhea.  · GENITOURINARY: No frequency, dysuria or sexual dysfunction.  · HEMATOLOGIC/LYMPHATIC: No excessive bleeding, prolonged or excessive bleeding after dental  "extraction/injury.    Past Medical, Family and Social History: The patient's past medical, family and social history have been reviewed and updated as appropriate within the electronic medical record - see encounter notes.    Compliance: yes    Side effects: None    Risk Parameters:  Patient reports no suicidal ideation  Patient reports no homicidal ideation  Patient reports no self-injurious behavior  Patient reports no violent behavior    Exam (detailed: at least 9 elements; comprehensive: all 15 elements)   Constitutional  Vitals:  Most recent vital signs, dated less than 90 days prior to this appointment, were reviewed.   Vitals:    07/13/18 1031   BP: 116/89   Pulse: 98   Resp: 20   Weight: 73 kg (160 lb 13.2 oz)   Height: 5' 6" (1.676 m)        General:  unremarkable, age appropriate     Musculoskeletal  Muscle Strength/Tone:  no dystonia   Gait & Station:  non-ataxic     Psychiatric  Speech:  no latency; no press   Mood & Affect:  steady, euthymic  congruent and appropriate   Thought Process:  normal and logical   Associations:  intact   Thought Content:  normal, no suicidality, no homicidality, delusions, or paranoia   Insight:  has awareness of illness   Judgement: behavior is adequate to circumstances   Orientation:  grossly intact   Memory: intact for content of interview   Language: grossly intact   Attention Span & Concentration:  able to focus   Fund of Knowledge:  intact and appropriate to age and level of education     Assessment and Diagnosis   Status/Progress: Based on the examination today, the patient's problem(s) is/are improved.  New problems have been presented today.   Co-morbidities are complicating management of the primary condition.  There are no active rule-out diagnoses for this patient at this time.     General Impression:       ICD-10-CM ICD-9-CM   1. Recurrent major depressive disorder, in partial remission F33.41 296.35   2. ALFREDA (generalized anxiety disorder) F41.1 300.02   3. " Fibromyalgia syndrome M79.7 729.1       Intervention/Counseling/Treatment Plan   · Medication Management: The risks and benefits of medication were discussed with the patient. Increase Cymbalta 120mg QDay for depression / fibromyalgia, Continue Adderall 10mg BID for depression offlabel increase motivation  Continue Clonazepam 1mg QHS    Will attempt to decrease narcotics when able.  Patient just now truly responding and getting back to herself.  She was severely unable to function prior to being on this regiment.  Her divorce is now finalizing and she is caring for herself and others.      ·  Counseling provided with patient as follows: importance of compliance with chosen treatment options was emphasized, risks and benefits of treatment options, including medications, were discussed with the patient    Patient with possible pseudoseizures vs dissociation.      Return to Clinic: 2 months

## 2018-07-19 ENCOUNTER — TELEPHONE (OUTPATIENT)
Dept: NEUROLOGY | Facility: CLINIC | Age: 53
End: 2018-07-19

## 2018-07-19 DIAGNOSIS — R94.01 ABNORMAL EEG: Primary | ICD-10-CM

## 2018-07-19 NOTE — TELEPHONE ENCOUNTER
EEG shows a nonspecific abnormality in the left temporal area. I would like to repeat her MRI Brain to assess for any change,and to refer her to Epileptology to further evaluate as the EEG abnormality could represent seizures.     Please ensure that she has no metal in the body and that she is not claustrophobic.

## 2018-07-23 NOTE — TELEPHONE ENCOUNTER
Patient notified, able to verbalize understanding. MRI scheduled for Monday, 7/30/18. Patient with new Medicaid. Insurance updated and patient [place] [facility] [place]

## 2018-07-30 ENCOUNTER — HOSPITAL ENCOUNTER (OUTPATIENT)
Dept: RADIOLOGY | Facility: HOSPITAL | Age: 53
Discharge: HOME | End: 2018-07-30
Attending: NURSE PRACTITIONER
Payer: MEDICAID

## 2018-07-30 DIAGNOSIS — R94.01 ABNORMAL EEG: ICD-10-CM

## 2018-07-30 PROCEDURE — 70553 MRI BRAIN STEM W/O & W/DYE: CPT | Mod: 26,,, | Performed by: RADIOLOGY

## 2018-07-30 PROCEDURE — 25500020 PHARM REV CODE 255: Performed by: NURSE PRACTITIONER

## 2018-07-30 PROCEDURE — 70553 MRI BRAIN STEM W/O & W/DYE: CPT | Mod: TC

## 2018-07-30 PROCEDURE — A9585 GADOBUTROL INJECTION: HCPCS | Performed by: NURSE PRACTITIONER

## 2018-07-30 RX ORDER — GADOBUTROL 604.72 MG/ML
7 INJECTION INTRAVENOUS
Status: COMPLETED | OUTPATIENT
Start: 2018-07-30 | End: 2018-07-30

## 2018-07-30 RX ADMIN — GADOBUTROL 7 ML: 604.72 INJECTION INTRAVENOUS at 11:07

## 2018-08-03 ENCOUNTER — PATIENT MESSAGE (OUTPATIENT)
Dept: NEUROLOGY | Facility: CLINIC | Age: 53
End: 2018-08-03

## 2018-08-08 ENCOUNTER — OFFICE VISIT (OUTPATIENT)
Dept: OBSTETRICS AND GYNECOLOGY | Facility: CLINIC | Age: 53
End: 2018-08-08
Payer: MEDICAID

## 2018-08-08 VITALS
RESPIRATION RATE: 12 BRPM | HEART RATE: 66 BPM | WEIGHT: 161.63 LBS | DIASTOLIC BLOOD PRESSURE: 74 MMHG | SYSTOLIC BLOOD PRESSURE: 120 MMHG | BODY MASS INDEX: 25.97 KG/M2 | HEIGHT: 66 IN

## 2018-08-08 DIAGNOSIS — Z87.410 HISTORY OF CERVICAL DYSPLASIA: ICD-10-CM

## 2018-08-08 DIAGNOSIS — Z90.710 S/P LAPAROSCOPIC HYSTERECTOMY: ICD-10-CM

## 2018-08-08 DIAGNOSIS — Z12.31 SCREENING MAMMOGRAM, ENCOUNTER FOR: ICD-10-CM

## 2018-08-08 DIAGNOSIS — D06.9 CIN III (CERVICAL INTRAEPITHELIAL NEOPLASIA GRADE III) WITH SEVERE DYSPLASIA: ICD-10-CM

## 2018-08-08 DIAGNOSIS — Z12.39 SCREENING BREAST EXAMINATION: ICD-10-CM

## 2018-08-08 DIAGNOSIS — Z01.419 WELL WOMAN EXAM WITH ROUTINE GYNECOLOGICAL EXAM: Primary | ICD-10-CM

## 2018-08-08 PROCEDURE — 99396 PREV VISIT EST AGE 40-64: CPT | Mod: S$PBB,,, | Performed by: OBSTETRICS & GYNECOLOGY

## 2018-08-08 PROCEDURE — 99999 PR PBB SHADOW E&M-EST. PATIENT-LVL IV: CPT | Mod: PBBFAC,,, | Performed by: OBSTETRICS & GYNECOLOGY

## 2018-08-08 PROCEDURE — 99214 OFFICE O/P EST MOD 30 MIN: CPT | Mod: PBBFAC | Performed by: OBSTETRICS & GYNECOLOGY

## 2018-08-08 PROCEDURE — 88175 CYTOPATH C/V AUTO FLUID REDO: CPT

## 2018-08-08 NOTE — PROGRESS NOTES
Subjective:    Patient ID: Aleah Hagan is a 52 y.o. y.o. female.     Chief Complaint: Annual Well Woman Exam     History of Present Illness:  Aleah WRAY presents today for Annual Well Woman exam. She describes her menses as absent, pt had hysterectomy for high grade cervical dysplasia last year.She denies pelvic pain.  She denies breast tenderness, masses, nipple discharge, but she has an encapsulated right breast implant. She denies difficulty with urination. She denies menopausal symptoms such as hotflashes, vaginal dryness, and night sweats. She is not currently sexually active and reports decreased libido.       Menstrual History:   Patient's last menstrual period was 2015 (exact date)..     OB History      Para Term  AB Living    3 3 2 1   2    SAB TAB Ectopic Multiple Live Births                       The following portions of the patient's history were reviewed and updated as appropriate: allergies, current medications, past family history, past medical history, past social history, past surgical history and problem list.    ROS:   CONSTITUTIONAL: fatigue, Denies: fever, chills, diaphoresis, weakness, weight loss, weight gain  ENT: negative for sore throat, nasal congestion, nasal discharge, epistaxis, tinnitus, hearing loss  EYES: negative for blurry vision, decreased vision, loss of vision, eye pain, diplopia, photophobia, discharge  SKIN: Negative for rash, itching, hives  RESPIRATORY: negative for cough, hemoptysis, shortness of breath, pleuritic chest pain, wheezing  CARDIOVASCULAR: negative for chest pain, dyspnea on exertion, orthopnea, paroxysmal nocturnal dyspnea, edema, palpitations  BREAST: negative for breast  tenderness, breast mass, nipple discharge, or skin changes  GASTROINTESTINAL: abdominal pain, constipation, Denies: flank pain, nausea, vomiting, diarrhea, black stool, blood in stool  GENITOURINARY: negative for abnormal vaginal bleeding, amenorrhea, decreased  libido, dysuria, genital sores, hematuria, incontinence, menorrhagia, pelvic pain, urinary frequency, vaginal discharge  HEMATOLOGIC/LYMPHATIC: bruising, Denies: swollen lymph nodes, bleeding  MUSCULOSKELETAL: back pain, joint pain, muscle pain, muscle weakness, Denies: joint swelling  NEUROLOGICAL: headache, numbness/tingling, Denies: seizures  BEHAVORIAL/PSYCH: No psychosis and Positive for anxiety and depression  ENDOCRINE: hair loss, negative for polydipsia/polyuria, palpitations, skin changes, temperature intolerance, unexpected weight changes      Objective:    Vital Signs:  Vitals:    08/08/18 1516   BP: 120/74   Pulse: 66   Resp: 12       Physical Exam:  General:  alert, cooperative, no distress   Skin:  Skin color, texture, turgor normal. No rashes or lesions   HEENT:  extra ocular movement intact, sclera clear, anicteric   Neck: supple, trachea midline, no adenopathy or thyromegally   Respiratory:  Normal effort   Breasts:  right breast more firm than left, no discharge, erythema, tenderness, or palpable masses; no axillary lymphadenopathy   Abdomen:  soft, nontender, no palpable masses   Pelvis: External genitalia: normal general appearance  Urinary system: urethral meatus normal, bladder nontender  Vaginal: normal mucosa without prolapse or lesions  Cervix: removed surgically  Uterus: removed surgically  Adnexa: normal size, nontender bilaterally   Extremities: Normal ROM; no edema, no cyanosis   Neurologial: Normal strength and tone. No focal numbness or weakness.   Psychiatric: normal mood, speech, dress, and thought processes         Assessment:       Healthy female exam.     1. Well woman exam with routine gynecological exam    2. History of cervical dysplasia    3. S/P laparoscopic hysterectomy    4. ANH III (cervical intraepithelial neoplasia grade III) with severe dysplasia    5. Screening breast examination    6. Screening mammogram, encounter for          Plan:      Well woman exam with routine  gynecological exam    History of cervical dysplasia  -     Cancel: Mammo Digital Screening Bilat with Tomosynthesis_CAD; Future; Expected date: 08/08/2018  -     Liquid-based pap smear, screening    S/P laparoscopic hysterectomy  -     Cancel: Mammo Digital Screening Bilat with Tomosynthesis_CAD; Future; Expected date: 08/08/2018  -     Liquid-based pap smear, screening    ANH III (cervical intraepithelial neoplasia grade III) with severe dysplasia  -     Cancel: Mammo Digital Screening Bilat with Tomosynthesis_CAD; Future; Expected date: 08/08/2018  -     Liquid-based pap smear, screening    Screening breast examination    Screening mammogram, encounter for  -     Cancel: Mammo Digital Screening Bilat with Tomosynthesis_CAD; Future; Expected date: 08/08/2018  -     Mammo Digital Screening Bilateral With CAD; Future; Expected date: 08/08/2018    Other orders  -     Cancel: Liquid-based pap smear, diagnostic          COUNSELING:  Aleah WRAY was counseled on A.C.O.G. Pap guidelines and recommendations for yearly pelvic exams in addition to recommendations for monthly self breast exams; to see her PCP for other health maintenance.

## 2018-08-09 ENCOUNTER — LAB VISIT (OUTPATIENT)
Dept: LAB | Facility: HOSPITAL | Age: 53
End: 2018-08-09
Attending: NURSE PRACTITIONER
Payer: MEDICAID

## 2018-08-09 ENCOUNTER — OFFICE VISIT (OUTPATIENT)
Dept: NEUROLOGY | Facility: CLINIC | Age: 53
End: 2018-08-09
Payer: MEDICAID

## 2018-08-09 VITALS
HEART RATE: 92 BPM | SYSTOLIC BLOOD PRESSURE: 118 MMHG | DIASTOLIC BLOOD PRESSURE: 86 MMHG | HEIGHT: 67 IN | WEIGHT: 161.63 LBS | RESPIRATION RATE: 14 BRPM | BODY MASS INDEX: 25.37 KG/M2

## 2018-08-09 DIAGNOSIS — F44.5 PSEUDOSEIZURES: Primary | ICD-10-CM

## 2018-08-09 DIAGNOSIS — E55.9 VITAMIN D DEFICIENCY: ICD-10-CM

## 2018-08-09 DIAGNOSIS — R94.01 ABNORMAL EEG: ICD-10-CM

## 2018-08-09 DIAGNOSIS — M79.7 FIBROMYALGIA: ICD-10-CM

## 2018-08-09 DIAGNOSIS — F41.1 GAD (GENERALIZED ANXIETY DISORDER): ICD-10-CM

## 2018-08-09 DIAGNOSIS — R53.83 FATIGUE, UNSPECIFIED TYPE: ICD-10-CM

## 2018-08-09 DIAGNOSIS — E53.8 VITAMIN B12 DEFICIENCY: ICD-10-CM

## 2018-08-09 DIAGNOSIS — R40.4 TRANSIENT ALTERATION OF AWARENESS: ICD-10-CM

## 2018-08-09 DIAGNOSIS — F33.2 SEVERE EPISODE OF RECURRENT MAJOR DEPRESSIVE DISORDER, WITHOUT PSYCHOTIC FEATURES: ICD-10-CM

## 2018-08-09 PROBLEM — M79.18 MYOFASCIAL PAIN: Status: RESOLVED | Noted: 2017-11-22 | Resolved: 2018-08-09

## 2018-08-09 LAB
25(OH)D3+25(OH)D2 SERPL-MCNC: 30 NG/ML
TSH SERPL DL<=0.005 MIU/L-ACNC: 0.82 UIU/ML

## 2018-08-09 PROCEDURE — 99999 PR PBB SHADOW E&M-EST. PATIENT-LVL III: CPT | Mod: PBBFAC,,, | Performed by: NURSE PRACTITIONER

## 2018-08-09 PROCEDURE — 36415 COLL VENOUS BLD VENIPUNCTURE: CPT

## 2018-08-09 PROCEDURE — 84443 ASSAY THYROID STIM HORMONE: CPT

## 2018-08-09 PROCEDURE — 99214 OFFICE O/P EST MOD 30 MIN: CPT | Mod: S$PBB,,, | Performed by: NURSE PRACTITIONER

## 2018-08-09 PROCEDURE — 99213 OFFICE O/P EST LOW 20 MIN: CPT | Mod: PBBFAC | Performed by: NURSE PRACTITIONER

## 2018-08-09 PROCEDURE — 82306 VITAMIN D 25 HYDROXY: CPT

## 2018-08-09 RX ORDER — CYCLOBENZAPRINE HCL 5 MG
10 TABLET ORAL 3 TIMES DAILY PRN
COMMUNITY
End: 2018-10-22

## 2018-08-09 NOTE — PROGRESS NOTES
"HPI: Aleah Hagan is a 52 y.o. female with  Fibromyalgia and mild  Lumbar facet arthropathy with multiple areas of pain.      She present today for a follow up visit. She underwent an ambulatory EEG in 6/2018, which showed mild, non-specific cerebral dysfunction in the left temporal region. MRI Brain per epilepsy protocol was then ordered, which was overall unremarkable.    She continues with frequent episodes, up to 10x per day. When she is experiencing an excessive amount of pain after increased physical activity, such as after working in her yard, she experiences greying out of her vision. She then sits on the couch, and gets "knocked out" for 2-3 hours. She is not aware of anything during these episodes. Near the end of her episodes, if her children speak to her or ask her a question, she knows what she wants to say; however, the words that come out of her mouth do not make sense. No loss of bowel or bladder function. She feels tired for the rest of the day after these episodes, and sleeps for 6 hours in the evening, which is excessive for her, as she normally sleeps only 3-4 hours each night, secondary to pain.     Episodes began to occur more around the time of her divorce this year; however, these episode began several years ago. She had one episode one year ago when she woke up in the parking lot at [employer].    She continues to see Dr. hWite for her mood issues. In reviewing his note, pseudoseizures were in his differential diagnosis for her, given her possible dissociative episodes.     FM overall unchanged. She uses massage and dancing for exercise, which does help.     She completed a sleep study, which showed mild CAMMIE. An Rx was placed for CPAP supplies; however, she switched from private insurance to Medicaid and has not received her CPAP yet, because of the insurance change.     Review of Systems   Constitutional: Negative for fever.   HENT: Negative for nosebleeds.    Eyes: Negative for " double vision.   Respiratory: Negative for hemoptysis.    Cardiovascular: Negative for leg swelling.   Gastrointestinal: Negative for blood in stool.   Genitourinary: Negative for hematuria.   Musculoskeletal: Positive for back pain, myalgias and neck pain.   Skin: Negative for rash.   Neurological: Positive for seizures and loss of consciousness. Negative for tremors.   Psychiatric/Behavioral: Positive for depression and memory loss. The patient is nervous/anxious and has insomnia.      Exam:  Gen Appearance, well developed/nourished in no apparent distress  CV: 2+ distal pulses with no edema or swelling  Neuro:  MS: Awake, alert, Sustains attention..  Recent/remote memory intact, Language is full to spontaneous speech/comprehension. Fund of Knowledge is full.   Mood is euthymic  CN: Optic discs are flat with normal vasculature, PERRL, Extraoccular movements and visual fields are full. Normal facial sensation and strength, Hearing symmetric, Tongue and Palate are midline and strong. Shoulder Shrug symmetric and strong.  Motor: Normal bulk, tone, no abnormal movements. 5/5 strength bilateral upper/lower extremities with 2+ reflexes  Sensory: symmetric to  Temp,  and vibration Romberg negative  Cerebellar: Heal to shin, Finger to nose, Rapid alternating movements intact  Gait: Normal stance, no ataxia  Multiple tender points.     Imaging   10/1/13 MRI Brain:  IMPRESSION:   UNREMARKABLE MRI OF THE BRAIN SPECIFICALLY WITHOUT   EVIDENCE FOR ACUTE INFARCTION OR ENHANCING LESION.   CLINICAL CORRELATION AND FURTHER EVALUATION AS   WARRANTED.    7/2018 MRI Brain:  FINDINGS:  Intracranial compartment:    Ventricles and sulci are normal in size for age without evidence of hydrocephalus. No extra-axial blood or fluid collections.    The brain parenchyma appears normal. No mass lesion, acute hemorrhage, edema or acute infarct. No abnormal enhancement.    Normal vascular flow voids are preserved.    Skull/extracranial contents  (limited evaluation): Bone marrow signal intensity is normal.  There is partial opacification of the bilateral mastoid air cells, left greater than right.      Impression       No acute abnormality.    Partial opacification of the bilateral mastoid air cells, left greater than right.     CT head 2/2018: Unremarkable noncontrast CT head specifically without evidence for acute intracranial hemorrhage. Further evaluation as warrented clinically.    5/2018 EEG:  INTERPRETATION:  EEG obtained on this awake and drowsy patient is within  normal limits.  Beta artifact may be a medication effect.  No subclinical  seizures or epileptiform features are appreciated; however, please keep in  mind that a normal EEG does not preclude a diagnosis of epilepsy and  clinical correlation is advised.     6/2018 Ambulatory EEG:  Mild non-specific cerebral dysfunction in the left temporal region.     Labs: 5/2018 Vitamin D and TSH were not completed    Assessment/Plan: Aleah Hagan is a 52 y.o. female with  Fibromyalgia and mild  Lumbar facet arthropathy (by MRI L spine 2012) with multiple areas of pain. Now with spells, suspicious for pseudoseizures, with an abnormal ambulatory EEG.     I recommend:  1. Episodes continue to be suggestive of pseudoseizures, as episodes are triggered by stress or fibromyalgia pain; however, given the mild non-specific cerebral dysfunction noted in the left temporal region on ambulatory EEG from 6/2018, I will refer her to Epileptology for consideration of EMU monitoring. No episodes occurred during ambulatory EEG, as there was no stress or increased fibromyalgia pain at the time. Repeat MRI Brain per epilepsy protocol was unremarkable.   2. Advised to try yoga for fibromyalgia pain, which is helped with massage and dancing for exercise.   3. Diagnosed with FM per her second Rheumatology consult, who suggested Flexeril, massage, aqua aerobics. Flexeril  D/c'ed due to concern for sedation by psychiatry  "prior. Unsure if Lyrica helped prior, but couldn't tolerate doses higher than 50 mg bid. Gabapentin ineffective prior. She failed cervical TPI's prior, but is unsure if this was ineffective, due to her level of stress at the time. Further Pamelor will be avoided given her psychiatric treatment, ongoing, Neuropathic cream given for fibro pain not helpful. Elavil worsened constipation. Had some relief with dry needling prior.   4. Check a TSH and Vitamin D level today. Not done at last visit. She is on supplementation for Vitamin D deficiency.   5. She continues treatment for anxiety and depression with psychiatry, Dr White and is using Cymbalta.  6. Consider repeating imaging if any alarming symptoms like lumbar radicular pain. Lumbar brace ineffective for pain.   7. I advised that she not drive until her suspected pseudoseizures resolve for 6 months. She has woken up in the parking lot before. Seizure precautions discussed.   8. Attempt to find a CPAP supplier who will take her insurance. Mild CAMMIE on Sleep study 2018.     RTC as scheduled with Dr. Maciel    CC: Dr. White    "this note will not be shared with the patient".       "

## 2018-08-21 ENCOUNTER — HOSPITAL ENCOUNTER (OUTPATIENT)
Dept: RADIOLOGY | Facility: HOSPITAL | Age: 53
Discharge: HOME | End: 2018-08-21
Attending: OBSTETRICS & GYNECOLOGY
Payer: MEDICAID

## 2018-08-21 VITALS — WEIGHT: 161 LBS | BODY MASS INDEX: 25.27 KG/M2 | HEIGHT: 67 IN

## 2018-08-21 DIAGNOSIS — Z12.31 SCREENING MAMMOGRAM, ENCOUNTER FOR: ICD-10-CM

## 2018-08-21 PROCEDURE — 77067 SCR MAMMO BI INCL CAD: CPT | Mod: 26,,, | Performed by: RADIOLOGY

## 2018-08-21 PROCEDURE — 77067 SCR MAMMO BI INCL CAD: CPT | Mod: TC

## 2018-08-21 PROCEDURE — 77063 BREAST TOMOSYNTHESIS BI: CPT | Mod: 26,,, | Performed by: RADIOLOGY

## 2018-10-22 ENCOUNTER — OFFICE VISIT (OUTPATIENT)
Dept: NEUROLOGY | Facility: CLINIC | Age: 53
End: 2018-10-22
Payer: MEDICAID

## 2018-10-22 VITALS
SYSTOLIC BLOOD PRESSURE: 112 MMHG | RESPIRATION RATE: 18 BRPM | HEIGHT: 67 IN | HEART RATE: 78 BPM | DIASTOLIC BLOOD PRESSURE: 82 MMHG | BODY MASS INDEX: 27.23 KG/M2 | WEIGHT: 173.5 LBS

## 2018-10-22 DIAGNOSIS — R94.01 ABNORMAL EEG: ICD-10-CM

## 2018-10-22 DIAGNOSIS — E55.9 VITAMIN D DEFICIENCY: ICD-10-CM

## 2018-10-22 DIAGNOSIS — F44.5 PSEUDOSEIZURES: Primary | ICD-10-CM

## 2018-10-22 DIAGNOSIS — M79.7 FIBROMYALGIA: ICD-10-CM

## 2018-10-22 PROCEDURE — 99999 PR PBB SHADOW E&M-EST. PATIENT-LVL III: CPT | Mod: PBBFAC,,, | Performed by: PSYCHIATRY & NEUROLOGY

## 2018-10-22 PROCEDURE — 99214 OFFICE O/P EST MOD 30 MIN: CPT | Mod: S$PBB,,, | Performed by: PSYCHIATRY & NEUROLOGY

## 2018-10-22 PROCEDURE — 99213 OFFICE O/P EST LOW 20 MIN: CPT | Mod: PBBFAC | Performed by: PSYCHIATRY & NEUROLOGY

## 2018-10-22 RX ORDER — CYCLOBENZAPRINE HCL 5 MG
5 TABLET ORAL NIGHTLY PRN
Qty: 30 TABLET | Refills: 5 | Status: SHIPPED | OUTPATIENT
Start: 2018-10-22 | End: 2018-11-01

## 2018-10-22 NOTE — PROGRESS NOTES
"HPI: Aleah Hagan is a 53 y.o. female with  Fibromyalgia and mild  Lumbar facet arthropathy (by MRI L spine 2012) with multiple areas of pain.        Patient has been seeing Stella Sullivan for spells of greying of vision and "getting knocked out" for hours after physical activity which was suspected to be pseudoseizures.  She had reported this to the rheumatologist I sent her to for second opinion about her fibromyalgia pain.  Ambulatory EEG showed, "mild, non-specific cerebral dysfunction in the left temporal region"   She was referred to epileptology after this was found by EEG for further evaluation of her spells  Patient also had sleep study which showed mild CAMMIE  She has not followed up with Dr White as he recommended at one month at the last visit in May.   She has stopped her mood medication including cymbalta as she does not feels she needs this. She feels her depression has improved as her situation with her ex  has changed.  She does not feel cymbalta ever helped her pain to continue to take this.    She has not had any further spells in which she has had any loss of alteration of consciousness since September when she tried to wear a back brace and she had increased pain. The spell was typical in which she had 6 events of "just being knocked out" in which she describes getting increased pain and knowing a bizarre sensation of something going wrong. She can sit down and for the spells last month she feels symptoms were brief and not as severe as prior.   She has felt that adderall has helped her concentration and focus.   She has not used brace since then. Her back pain is in the lower back, non radicular. She has diffuse pain throughout her body.     Review of Systems   Constitutional: Negative for fever.   HENT: Negative for nosebleeds.    Eyes: Negative for double vision.   Respiratory: Negative for hemoptysis.    Cardiovascular: Negative for leg swelling.   Gastrointestinal: Negative " for blood in stool.   Genitourinary: Negative for hematuria.   Musculoskeletal: Positive for myalgias.   Skin: Negative for rash.   Neurological: Negative for tremors.   Psychiatric/Behavioral: Negative for depression.       Exam:  Gen Appearance, well developed/nourished in no apparent distress  CV: 2+ distal pulses with no edema or swelling  Neuro:  MS: Awake, alert, Sustains attention..  Recent/remote memory intact, Language is full to spontaneous speech/comprehension. Fund of Knowledge is full.   Mood is euthymic  CN: Optic discs are flat with normal vasculature, PERRL, Extraoccular movements and visual fields are full. Normal facial sensation and strength, Hearing symmetric, Tongue and Palate are midline and strong. Shoulder Shrug symmetric and strong.  Motor: Normal bulk, tone, no abnormal movements. 5/5 strength bilateral upper/lower extremities with 2+ reflexes  Sensory: symmetric to  Temp,  and vibration Romberg negative  Cerebellar: Heal to shin, Finger to nose, Rapid alternating movements intact  Gait: Normal stance, no ataxia  Multiple tender points.     Imaging 10/1/13 MRI brain:  IMPRESSION:   UNREMARKABLE MRI OF THE BRAIN SPECIFICALLY WITHOUT   EVIDENCE FOR ACUTE INFARCTION OR ENHANCING LESION.   CLINICAL CORRELATION AND FURTHER EVALUATION AS   WARRANTED.     CT head 1/2018: Unremarkable noncontrast CT head specifically without evidence for acute intracranial hemorrhage. Further evaluation as warrented clinically.    5/2018 EEG:    INTERPRETATION:  EEG obtained on this awake and drowsy patient is within  normal limits.  Beta artifact may be a medication effect.  No subclinical  seizures or epileptiform features are appreciated; however, please keep in  mind that a normal EEG does not preclude a diagnosis of epilepsy and  clinical correlation is advised.       MRI  Brain: 2018: No acute abnormality.    Partial opacification of the bilateral mastoid air cells, left greater than right    Labs: 2018 TSH  normal. Vit D low normal    Assessment/Plan: Aleah Hagan is a 53 y.o. female with  Fibromyalgia and mild  Lumbar facet arthropathy (by MRI L spine 2012) with multiple areas of pain.    I recommend:   1. Episodes continue to be suggestive of pseudoseizures, as episodes are triggered by stress or increased fibromyalgia pain; however, given the mild non-specific cerebral dysfunction noted in the left temporal region on ambulatory EEG from 6/2018, she was referred to Epileptology for consideration of EMU monitoring and is on a waiting wist for this. No episodes occurred during ambulatory EEG, as there was no stress or increased fibromyalgia pain at the time. Repeat MRI Brain per epilepsy protocol was unremarkable in 2018.   2. She was diagnosed with FM per her second Rheumatology consult, who suggested Flexeril, massage, aqua aerobics. Flexeril  D/c'ed due to concern for sedation by psychiatry prior but she can resume as she is off other meds unless further sedation. Unsure if Lyrica helped prior, but couldn't tolerate doses higher than 50 mg bid. Gabapentin ineffective prior. She failed cervical TPI's prior, but is unsure if this was ineffective, due to her level of stress at the time. Further Pamelor will be avoided given her psychiatric treatment, ongoing, Neuropathic cream given for fibro pain not helpful. Elavil worsened constipation. Had some relief with dry needling prior. She has not felt that any prevention medication including cymbalta has helped her pain long term  4. She was started on Vitamin D for lower normal levels in 2018. Follow Vit D levels at the next visit.   5. She has not maintained long term treatment of anxiety and depression with psychiatry, Dr White- she will need to re-establish care for that provider as she requests adderall refill today. She has stopped her medications for mood for some time as she feels mood symptoms have improved.   6. She has diffuse pain, nothing to suggest a  need for further lumbar imaging at this time (like radicular pain, weakness or sensory symptoms)/ was given a back brace prior but had increased pain and could not tolerate it.   7. I advised that she not drive until her suspected pseudoseizures resolve for 6 months.   8. Attempt to find a CPAP supplier who will take her insurance once again- staff is investigating. Mild CAMMIE on Sleep study 2018.       RTC 6 months

## 2018-11-13 RX ORDER — DEXTROAMPHETAMINE SACCHARATE, AMPHETAMINE ASPARTATE, DEXTROAMPHETAMINE SULFATE AND AMPHETAMINE SULFATE 2.5; 2.5; 2.5; 2.5 MG/1; MG/1; MG/1; MG/1
10 TABLET ORAL 2 TIMES DAILY
Qty: 60 TABLET | Refills: 0 | Status: SHIPPED | OUTPATIENT
Start: 2018-11-13 | End: 2018-12-07 | Stop reason: SDUPTHER

## 2018-11-28 RX ORDER — CLONAZEPAM 1 MG/1
TABLET ORAL
Qty: 30 TABLET | Refills: 1 | OUTPATIENT
Start: 2018-11-28

## 2018-12-07 ENCOUNTER — OFFICE VISIT (OUTPATIENT)
Dept: PSYCHIATRY | Facility: CLINIC | Age: 53
End: 2018-12-07
Attending: PSYCHIATRY & NEUROLOGY
Payer: MEDICAID

## 2018-12-07 VITALS
DIASTOLIC BLOOD PRESSURE: 82 MMHG | HEART RATE: 84 BPM | BODY MASS INDEX: 26.08 KG/M2 | WEIGHT: 166.13 LBS | RESPIRATION RATE: 19 BRPM | SYSTOLIC BLOOD PRESSURE: 136 MMHG | HEIGHT: 67 IN

## 2018-12-07 DIAGNOSIS — F33.41 RECURRENT MAJOR DEPRESSIVE DISORDER, IN PARTIAL REMISSION: Primary | ICD-10-CM

## 2018-12-07 DIAGNOSIS — F41.1 GAD (GENERALIZED ANXIETY DISORDER): ICD-10-CM

## 2018-12-07 PROCEDURE — 99999 PR PBB SHADOW E&M-EST. PATIENT-LVL III: CPT | Mod: PBBFAC,,, | Performed by: PSYCHIATRY & NEUROLOGY

## 2018-12-07 PROCEDURE — 90833 PSYTX W PT W E/M 30 MIN: CPT | Mod: S$PBB,AF,, | Performed by: PSYCHIATRY & NEUROLOGY

## 2018-12-07 PROCEDURE — 99213 OFFICE O/P EST LOW 20 MIN: CPT | Mod: PBBFAC | Performed by: PSYCHIATRY & NEUROLOGY

## 2018-12-07 PROCEDURE — 90833 PSYTX W PT W E/M 30 MIN: CPT | Mod: PBBFAC | Performed by: PSYCHIATRY & NEUROLOGY

## 2018-12-07 PROCEDURE — 99213 OFFICE O/P EST LOW 20 MIN: CPT | Mod: S$PBB,AF,, | Performed by: PSYCHIATRY & NEUROLOGY

## 2018-12-07 RX ORDER — DEXTROAMPHETAMINE SACCHARATE, AMPHETAMINE ASPARTATE, DEXTROAMPHETAMINE SULFATE AND AMPHETAMINE SULFATE 2.5; 2.5; 2.5; 2.5 MG/1; MG/1; MG/1; MG/1
10 TABLET ORAL 2 TIMES DAILY
Qty: 60 TABLET | Refills: 0 | Status: SHIPPED | OUTPATIENT
Start: 2019-01-28 | End: 2019-02-05 | Stop reason: SDUPTHER

## 2018-12-07 RX ORDER — CYCLOBENZAPRINE HCL 10 MG
10 TABLET ORAL 3 TIMES DAILY PRN
COMMUNITY
End: 2021-10-26 | Stop reason: SDUPTHER

## 2018-12-07 RX ORDER — DULOXETIN HYDROCHLORIDE 60 MG/1
60 CAPSULE, DELAYED RELEASE ORAL DAILY
Qty: 90 CAPSULE | Refills: 0 | Status: SHIPPED | OUTPATIENT
Start: 2018-12-07 | End: 2019-04-05 | Stop reason: SDUPTHER

## 2018-12-07 RX ORDER — DEXTROAMPHETAMINE SACCHARATE, AMPHETAMINE ASPARTATE, DEXTROAMPHETAMINE SULFATE AND AMPHETAMINE SULFATE 2.5; 2.5; 2.5; 2.5 MG/1; MG/1; MG/1; MG/1
10 TABLET ORAL 2 TIMES DAILY
Qty: 60 TABLET | Refills: 0 | Status: SHIPPED | OUTPATIENT
Start: 2018-12-28 | End: 2018-12-07 | Stop reason: SDUPTHER

## 2018-12-07 NOTE — PROGRESS NOTES
Outpatient Psychiatry Follow-Up Visit (MD/NP)    12/7/2018    Clinical Status of Patient:  Outpatient (Ambulatory)    Chief Complaint:  Aleah Hagan is a 53 y.o. female who presents today for follow-up of depression and anxiety.  Met with patient.      Interval History and Content of Current Session:  Interim Events/Subjective Report/Content of Current Session:         She had a sleep study and was told that she needs a cpap machine.  Her insurance did not want to cover the machine.      She continues to have what is likely pseudoseizures.  She is on a waiting list for an epileptologist.      Current Medication  Clonazepam 1mg QHS   Adderall XR 10mg BID (only using as needed)  Cymbalta 60mg QDay    Exercise  Stays active working at her home     Psychosocial    Her divorce is now finalized.  She had to fire her  because he wasn't really helping her.  She has now retained a more competent .       Improving Symptoms of Depression: no diminished mood or improved loss of interest/anhedonia; irritability, varied diminished energy, improved change in sleep, +change in appetite, improving diminished concentration or cognition or indecisiveness, PMA/R, less excessive guilt or hopelessness or worthlessness, denies suicidal ideations     She has a lot of stress but is coping a lot better than she used to be    Improved Changes in Sleep: less trouble with initiation, +maintenance, +early morning awakening with inability to return to sleep, hypersomnolence     Sleeping much better now     Denies Suicidal/Homicidal ideations: no active/passive ideations, organized plans, future intentions     No psychosis or soraya     Improved Symptoms of ALFREDA: less excessive anxiety/worry/fear, +more days than not, +about numerous issues, +difficult to control, +with restlessness, +fatigue, less poor concentration, +irritability, +muscle tension, +sleep disturbance; less causes functionally impairing distress      Taking  clonazepam only seldom.      Denies Symptoms of Panic Disorder: less recurrent panic attacks (palpitations/heart racing, sweating, shakiness, dyspnea, choking, chest pain/discomfort, Gi symptoms, dizzy/lightheadedness, hot/col flashes, paresthesias, derealization, fear of losing control or fear of dying), +precipitated or un-precipitated, source of worry and/or behavioral changes secondary, with or without agoraphobia    Panic attacks continue to be less often    Psychotherapy:  · Target symptoms: depression, anxiety   · Why chosen therapy is appropriate versus another modality: relevant to diagnosis  · Outcome monitoring methods: self-report, observation  · Therapeutic intervention type: behavior modifying psychotherapy, supportive psychotherapy  · Topics discussed/themes: relationships difficulties, stress related to medical comorbidities, difficulty managing affect in interpersonal relationships, building skills sets for symptom management, symptom recognition  · The patient's response to the intervention is accepting. The patient's progress toward treatment goals is limited.   · Duration of intervention: 16 minutes.    We discussed many psychosocial stressors in a supportive way. We discussed coping with her pain.      Review of Systems   · PSYCHIATRIC: Pertinant items are noted in the narrative.  · CONSTITUTIONAL: No weight gain or loss.   · MUSCULOSKELETAL: has chronic pain from fibromyalgia worsening this appointment -- continued  · NEUROLOGIC: possible pseudoseizures  · RESPIRATORY: No shortness of breath.  · CARDIOVASCULAR: No tachycardia or chest pain.  · GASTROINTESTINAL: No nausea, vomiting, pain, constipation or diarrhea.  · GENITOURINARY: No frequency, dysuria or sexual dysfunction.  · HEMATOLOGIC/LYMPHATIC: No excessive bleeding, prolonged or excessive bleeding after dental extraction/injury.    Past Medical, Family and Social History: The patient's past medical, family and social history have been  "reviewed and updated as appropriate within the electronic medical record - see encounter notes.    Compliance: yes, somewhat worsened by missed appointments    Side effects: None    Risk Parameters:  Patient reports no suicidal ideation  Patient reports no homicidal ideation  Patient reports no self-injurious behavior  Patient reports no violent behavior    Exam (detailed: at least 9 elements; comprehensive: all 15 elements)   Constitutional  Vitals:  Most recent vital signs, dated less than 90 days prior to this appointment, were reviewed.   Vitals:    12/07/18 1240   BP: 136/82   Pulse: 84   Resp: 19   Weight: 75.4 kg (166 lb 1.9 oz)   Height: 5' 7" (1.702 m)        General:  unremarkable, age appropriate     Musculoskeletal  Muscle Strength/Tone:  no dystonia   Gait & Station:  non-ataxic     Psychiatric  Speech:  no latency; no press   Mood & Affect:  steady, euthymic  congruent and appropriate   Thought Process:  normal and logical   Associations:  intact   Thought Content:  normal, no suicidality, no homicidality, delusions, or paranoia   Insight:  has awareness of illness   Judgement: behavior is adequate to circumstances   Orientation:  grossly intact   Memory: intact for content of interview   Language: grossly intact   Attention Span & Concentration:  able to focus   Fund of Knowledge:  intact and appropriate to age and level of education     Assessment and Diagnosis   Status/Progress: Based on the examination today, the patient's problem(s) is/are improved.  New problems have been presented today.   Co-morbidities are complicating management of the primary condition.  There are no active rule-out diagnoses for this patient at this time.     General Impression:       ICD-10-CM ICD-9-CM   1. Recurrent major depressive disorder, in partial remission F33.41 296.35   2. ALFREDA (generalized anxiety disorder) F41.1 300.02       Intervention/Counseling/Treatment Plan   · Medication Management: The risks and benefits " of medication were discussed with the patient. Continue Cymbalta 60mg QDay for depression / fibromyalgia, Continue Adderall 10mg BID for depression offlabel increase motivation  Continue Clonazepam 1mg QHS    Will attempt to decrease narcotics when able.  Patient just now truly responding and getting back to herself.  She was severely unable to function prior to being on this regiment.  Her divorce is now finalizing and she is caring for herself and others.      ·  Counseling provided with patient as follows: importance of compliance with chosen treatment options was emphasized, risks and benefits of treatment options, including medications, were discussed with the patient      Return to Clinic: 2 months

## 2019-01-22 ENCOUNTER — OFFICE VISIT (OUTPATIENT)
Dept: NEUROLOGY | Facility: CLINIC | Age: 54
End: 2019-01-22
Payer: MEDICAID

## 2019-01-22 VITALS
RESPIRATION RATE: 16 BRPM | BODY MASS INDEX: 25.64 KG/M2 | SYSTOLIC BLOOD PRESSURE: 110 MMHG | HEIGHT: 67 IN | HEART RATE: 74 BPM | WEIGHT: 163.38 LBS | DIASTOLIC BLOOD PRESSURE: 92 MMHG

## 2019-01-22 DIAGNOSIS — E53.8 VITAMIN B12 DEFICIENCY: ICD-10-CM

## 2019-01-22 DIAGNOSIS — R53.83 FATIGUE, UNSPECIFIED TYPE: ICD-10-CM

## 2019-01-22 DIAGNOSIS — E55.9 VITAMIN D DEFICIENCY: ICD-10-CM

## 2019-01-22 DIAGNOSIS — F44.5 PSEUDOSEIZURES: Primary | ICD-10-CM

## 2019-01-22 DIAGNOSIS — R94.01 ABNORMAL EEG: ICD-10-CM

## 2019-01-22 DIAGNOSIS — M79.7 FIBROMYALGIA: ICD-10-CM

## 2019-01-22 DIAGNOSIS — F44.9 CONVERSION DISORDER: ICD-10-CM

## 2019-01-22 DIAGNOSIS — F41.1 GAD (GENERALIZED ANXIETY DISORDER): ICD-10-CM

## 2019-01-22 DIAGNOSIS — F33.2 SEVERE EPISODE OF RECURRENT MAJOR DEPRESSIVE DISORDER, WITHOUT PSYCHOTIC FEATURES: ICD-10-CM

## 2019-01-22 PROBLEM — R40.4 TRANSIENT ALTERATION OF AWARENESS: Status: RESOLVED | Noted: 2018-05-09 | Resolved: 2019-01-22

## 2019-01-22 PROCEDURE — 99214 OFFICE O/P EST MOD 30 MIN: CPT | Mod: PBBFAC | Performed by: NURSE PRACTITIONER

## 2019-01-22 PROCEDURE — 99214 PR OFFICE/OUTPT VISIT, EST, LEVL IV, 30-39 MIN: ICD-10-PCS | Mod: S$PBB,,, | Performed by: NURSE PRACTITIONER

## 2019-01-22 PROCEDURE — 99999 PR PBB SHADOW E&M-EST. PATIENT-LVL IV: ICD-10-PCS | Mod: PBBFAC,,, | Performed by: NURSE PRACTITIONER

## 2019-01-22 PROCEDURE — 99999 PR PBB SHADOW E&M-EST. PATIENT-LVL IV: CPT | Mod: PBBFAC,,, | Performed by: NURSE PRACTITIONER

## 2019-01-22 PROCEDURE — 99214 OFFICE O/P EST MOD 30 MIN: CPT | Mod: S$PBB,,, | Performed by: NURSE PRACTITIONER

## 2019-01-22 RX ORDER — ACETAMINOPHEN 500 MG
5000 TABLET ORAL DAILY
COMMUNITY

## 2019-01-22 NOTE — PROGRESS NOTES
"HPI: Aleah Hagan is a 53 y.o. female with fibromyalgia and mild  Lumbar facet arthropathy (by MRI L spine 2012) with multiple areas of pain. Suspected pseudoseizure episodes. Ambulatory EEG in 2018 showed "mild, non-specific cerebral dysfunction in the left temporal region". She is pending consult with Epileptology, and is on a waiting list for this. Mild CAMMIE on sleep study 2018. Depression followed by Dr. White.     She presents today for a follow up visit. She reports severe myalgic complaints lately, with her no radicular lumbar pain being her greatest complaints. [facility], which were overall ineffective. She has tried multiple medications for her pain without relief, as well as trigger point injections. She is interested in consulting with another pain management provider. Dry needling per PT ineffective prior. Massage helps for a few days at a time.     She continues to experience episodes of greying vision and "getting knocked out" after excess pain or physical activity, which are suspected to be pseudoseizures. She remains on the waiting list for consult with Epileptology.     She continues to follow up with Dr. White, regarding her depression. She denies depression at this time.     She was able to obtain a CPAP for her CAMMIE, and she is sleeping better now.       Review of Systems   Constitutional: Negative for fever.   HENT: Negative for nosebleeds.    Eyes: Negative for double vision.   Respiratory: Negative for hemoptysis.    Cardiovascular: Negative for leg swelling.   Gastrointestinal: Negative for blood in stool.   Genitourinary: Negative for hematuria.   Musculoskeletal: Positive for back pain and myalgias.   Skin: Negative for rash.   Neurological: Negative for tremors.        Pseudoseizures   Psychiatric/Behavioral: Negative for depression.     Exam:  Gen Appearance, well developed/nourished in no apparent distress  CV: 2+ distal " pulses with no edema or swelling  Neuro:  MS: Awake, alert, Sustains attention. Recent/remote memory intact, Language is full to spontaneous speech/comprehension. Fund of Knowledge is full.   Mood is euthymic  CN: Optic discs are flat with normal vasculature, PERRL, Extraoccular movements and visual fields are full. Normal facial sensation and strength, Hearing symmetric, Tongue and Palate are midline and strong. Shoulder Shrug symmetric and strong.  Motor: Normal bulk, tone, no abnormal movements. 5/5 strength bilateral upper/lower extremities with 2+ reflexes  Sensory: symmetric to  Temp,  and vibration Romberg negative  Cerebellar: Heal to shin, Finger to nose, Rapid alternating movements intact  Gait: Normal stance, no ataxia  MSK survey: Multiple tender points.     Imaging 10/1/13 MRI Brain:    IMPRESSION:   UNREMARKABLE MRI OF THE BRAIN SPECIFICALLY WITHOUT   EVIDENCE FOR ACUTE INFARCTION OR ENHANCING LESION.   CLINICAL CORRELATION AND FURTHER EVALUATION AS   WARRANTED.     CT head 1/2018:   Unremarkable noncontrast CT head specifically without evidence for acute intracranial hemorrhage. Further evaluation as warrented clinically.    5/2018 EEG:    INTERPRETATION:  EEG obtained on this awake and drowsy patient is within  normal limits.  Beta artifact may be a medication effect.  No subclinical  seizures or epileptiform features are appreciated; however, please keep in  mind that a normal EEG does not preclude a diagnosis of epilepsy and  clinical correlation is advised.     MRI  Brain: 2018:   No acute abnormality.    Partial opacification of the bilateral mastoid air cells, left greater than right    Labs:   2018 TSH normal. Vit D low normal    Assessment/Plan: Aleah Hagan is a 53 y.o. female with  Fibromyalgia and mild  Lumbar facet arthropathy (by MRI L spine 2012) with multiple areas of pain.        I recommend:  1. Suspect myalgia complaints to be part of her conversion disorder, which is suspected  and was discussed today, which may be why her pain has been so difficult to treat thus far; however, I will refer her to pain management if she can find a provider, who will take her insurance. Advised her to check with her insurance and let clinic know the name of a provider, in order to have this referral placed. She has diffuse pain, nothing to suggest a need for further lumbar imaging at this time (like radicular pain, weakness or sensory symptoms)/ was given a back brace prior but had increased pain and could not tolerate it.     2. She was diagnosed with FM per her second Rheumatology consult, who suggested Flexeril, massage, aqua aerobics. Flexeril  D/c'ed due to concern for sedation by Psychiatry prior but she can resume as she is off other meds unless further sedation. Unsure if Lyrica helped prior, but couldn't tolerate doses higher than 50 mg bid. Gabapentin ineffective prior. She failed cervical TPI's prior, but is unsure if this was ineffective, due to her level of stress at the time. Further Pamelor will be avoided given her psychiatric treatment, ongoing, Neuropathic cream given for fibro pain not helpful. Elavil worsened constipation. Had some relief with dry needling prior. She has not felt that any prevention medication including cymbalta has helped her pain long term.   3. Episodes continue to be suggestive of pseudoseizures, as episodes are triggered by stress or increased fibromyalgia pain; however, given the mild non-specific cerebral dysfunction noted in the left temporal region on ambulatory EEG from 6/2018, she was referred to Epileptology for consideration of EMU monitoring and is on a waiting wist for this. No episodes occurred during ambulatory EEG, as there was no stress or increased fibromyalgia pain at the time. Repeat MRI Brain per epilepsy protocol was unremarkable in 2018.   4. She was started on Vitamin D for lower normal levels in 2018. Follow Vit D levels at the next visit.   5.  "Continue long term treatment of anxiety and depression with psychiatry, Dr White. Discussed that counseling and Psychiatric care were best methods to improve conversion disorder over time.    6. I advised that she not drive until her suspected pseudoseizures resolve for 6 months.   7. Continue compliance with CPAP for CAMMIE. Mild CAMMIE on Sleep study 2018.     RTC 6 months    "note will not be shared"    "

## 2019-01-22 NOTE — PATIENT INSTRUCTIONS
To find pain management providers who take your health insurance, please consult with your insurance company. There are some providers in [place].     Remain off driving for 6 months after you stop having episodes.

## 2019-01-24 RX ORDER — CLONAZEPAM 1 MG/1
TABLET ORAL
Qty: 30 TABLET | Refills: 1 | Status: SHIPPED | OUTPATIENT
Start: 2019-01-24 | End: 2019-06-11

## 2019-02-05 ENCOUNTER — OFFICE VISIT (OUTPATIENT)
Dept: PSYCHIATRY | Facility: CLINIC | Age: 54
End: 2019-02-05
Attending: PSYCHIATRY & NEUROLOGY
Payer: MEDICAID

## 2019-02-05 VITALS
HEIGHT: 67 IN | WEIGHT: 164.56 LBS | DIASTOLIC BLOOD PRESSURE: 76 MMHG | HEART RATE: 79 BPM | BODY MASS INDEX: 25.83 KG/M2 | RESPIRATION RATE: 18 BRPM | SYSTOLIC BLOOD PRESSURE: 123 MMHG

## 2019-02-05 DIAGNOSIS — F41.1 GAD (GENERALIZED ANXIETY DISORDER): ICD-10-CM

## 2019-02-05 DIAGNOSIS — F33.41 RECURRENT MAJOR DEPRESSIVE DISORDER, IN PARTIAL REMISSION: Primary | ICD-10-CM

## 2019-02-05 DIAGNOSIS — M79.7 FIBROMYALGIA SYNDROME: ICD-10-CM

## 2019-02-05 PROCEDURE — 99999 PR PBB SHADOW E&M-EST. PATIENT-LVL III: CPT | Mod: PBBFAC,,, | Performed by: PSYCHIATRY & NEUROLOGY

## 2019-02-05 PROCEDURE — 90833 PR PSYCHOTHERAPY W/PATIENT W/E&M, 30 MIN (ADD ON): ICD-10-PCS | Mod: AF,HB,S$PBB, | Performed by: PSYCHIATRY & NEUROLOGY

## 2019-02-05 PROCEDURE — 99213 OFFICE O/P EST LOW 20 MIN: CPT | Mod: AF,HB,S$PBB, | Performed by: PSYCHIATRY & NEUROLOGY

## 2019-02-05 PROCEDURE — 99999 PR PBB SHADOW E&M-EST. PATIENT-LVL III: ICD-10-PCS | Mod: PBBFAC,,, | Performed by: PSYCHIATRY & NEUROLOGY

## 2019-02-05 PROCEDURE — 90833 PSYTX W PT W E/M 30 MIN: CPT | Mod: PBBFAC | Performed by: PSYCHIATRY & NEUROLOGY

## 2019-02-05 PROCEDURE — 90833 PSYTX W PT W E/M 30 MIN: CPT | Mod: AF,HB,S$PBB, | Performed by: PSYCHIATRY & NEUROLOGY

## 2019-02-05 PROCEDURE — 99213 PR OFFICE/OUTPT VISIT, EST, LEVL III, 20-29 MIN: ICD-10-PCS | Mod: AF,HB,S$PBB, | Performed by: PSYCHIATRY & NEUROLOGY

## 2019-02-05 PROCEDURE — 99213 OFFICE O/P EST LOW 20 MIN: CPT | Mod: PBBFAC | Performed by: PSYCHIATRY & NEUROLOGY

## 2019-02-05 RX ORDER — DEXTROAMPHETAMINE SACCHARATE, AMPHETAMINE ASPARTATE, DEXTROAMPHETAMINE SULFATE AND AMPHETAMINE SULFATE 2.5; 2.5; 2.5; 2.5 MG/1; MG/1; MG/1; MG/1
10 TABLET ORAL 2 TIMES DAILY
Qty: 60 TABLET | Refills: 0 | Status: SHIPPED | OUTPATIENT
Start: 2019-02-28 | End: 2019-06-11 | Stop reason: SDUPTHER

## 2019-02-05 NOTE — PROGRESS NOTES
Outpatient Psychiatry Follow-Up Visit (MD/NP)    2/5/2019    Clinical Status of Patient:  Outpatient (Ambulatory)    Chief Complaint:  Aleah Hagan is a 53 y.o. female who presents today for follow-up of depression and anxiety.  Met with patient.      Interval History and Content of Current Session:  Interim Events/Subjective Report/Content of Current Session:       Current Medication  Clonazepam 1mg QHS   Adderall XR 10mg BID (only using as needed)  Cymbalta 60mg QDay    Exercise  Stays active working at her home     Psychosocial    Her divorce is now finalized.  She had to fire her  because he wasn't really helping her.  She has now retained a more competent .      2/5/19  Patient is doing relatively well.  She continues to have a child and grandchild at home.   Her home is chaos to her and it makes her feel more pain.  She continues to have pseudoseizures about once per week.  When she describes them they sound like fitful naps.  She continues to make more progress with her divorce than prior to starting stimulants.      Improved Symptoms of Depression: no diminished mood or improved loss of interest/anhedonia; irritability, varied diminished energy, improved change in sleep, +change in appetite, improving diminished concentration or cognition or indecisiveness, PMA/R, less excessive guilt or hopelessness or worthlessness, denies suicidal ideations     She has a lot of stress but is coping a lot better than she used to be    Improved Changes in Sleep: less trouble with initiation, +maintenance, +early morning awakening with inability to return to sleep, hypersomnolence     Sleeping much better now     Denies Suicidal/Homicidal ideations: no active/passive ideations, organized plans, future intentions     No psychosis or soraya     Worsening Symptoms of ALFREDA: + excessive anxiety/worry/fear, +more days than not, +about numerous issues, +difficult to control, +with restlessness, +fatigue, less poor  concentration, +irritability, +muscle tension, +sleep disturbance; less causes functionally impairing distress      Taking clonazepam only seldom.      Continued Symptoms of Panic Disorder: less recurrent panic attacks (palpitations/heart racing, sweating, shakiness, dyspnea, choking, chest pain/discomfort, Gi symptoms, dizzy/lightheadedness, hot/col flashes, paresthesias, derealization, fear of losing control or fear of dying), +precipitated or un-precipitated, source of worry and/or behavioral changes secondary, with or without agoraphobia    Panic attacks continue to be less often    Psychotherapy:  · Target symptoms: depression, anxiety   · Why chosen therapy is appropriate versus another modality: relevant to diagnosis  · Outcome monitoring methods: self-report, observation  · Therapeutic intervention type: behavior modifying psychotherapy, supportive psychotherapy  · Topics discussed/themes: relationships difficulties, stress related to medical comorbidities, difficulty managing affect in interpersonal relationships, building skills sets for symptom management, symptom recognition  · The patient's response to the intervention is accepting. The patient's progress toward treatment goals is limited.   · Duration of intervention: 16 minutes.    We discussed many psychosocial stressors in a supportive way. We discussed coping with her pain.      Review of Systems   · PSYCHIATRIC: Pertinant items are noted in the narrative.  · CONSTITUTIONAL: No weight gain or loss.   · MUSCULOSKELETAL: has chronic pain from fibromyalgia worsening this appointment -- continued  · NEUROLOGIC: possible pseudoseizures  · RESPIRATORY: No shortness of breath.  · CARDIOVASCULAR: No tachycardia or chest pain.  · GASTROINTESTINAL: No nausea, vomiting, pain, constipation or diarrhea.  · GENITOURINARY: No frequency, dysuria or sexual dysfunction.  · HEMATOLOGIC/LYMPHATIC: No excessive bleeding, prolonged or excessive bleeding after dental  "extraction/injury.    Past Medical, Family and Social History: The patient's past medical, family and social history have been reviewed and updated as appropriate within the electronic medical record - see encounter notes.    Compliance: yes, somewhat worsened by missed appointments    Side effects: None    Risk Parameters:  Patient reports no suicidal ideation  Patient reports no homicidal ideation  Patient reports no self-injurious behavior  Patient reports no violent behavior    Exam (detailed: at least 9 elements; comprehensive: all 15 elements)   Constitutional  Vitals:  Most recent vital signs, dated less than 90 days prior to this appointment, were reviewed.   Vitals:    02/05/19 1126   BP: 123/76   Pulse: 79   Resp: 18   Weight: 74.6 kg (164 lb 9.2 oz)   Height: 5' 7" (1.702 m)        General:  unremarkable, age appropriate     Musculoskeletal  Muscle Strength/Tone:  no dystonia   Gait & Station:  non-ataxic     Psychiatric  Speech:  no latency; no press   Mood & Affect:  steady, euthymic  congruent and appropriate   Thought Process:  normal and logical   Associations:  intact   Thought Content:  normal, no suicidality, no homicidality, delusions, or paranoia   Insight:  has awareness of illness   Judgement: behavior is adequate to circumstances   Orientation:  grossly intact   Memory: intact for content of interview   Language: grossly intact   Attention Span & Concentration:  able to focus   Fund of Knowledge:  intact and appropriate to age and level of education     Assessment and Diagnosis   Status/Progress: Based on the examination today, the patient's problem(s) is/are improved.  New problems have been presented today.   Co-morbidities are complicating management of the primary condition.  There are no active rule-out diagnoses for this patient at this time.     General Impression:       ICD-10-CM ICD-9-CM   1. Recurrent major depressive disorder, in partial remission F33.41 296.35   2. ALFREDA " (generalized anxiety disorder) F41.1 300.02   3. Fibromyalgia syndrome M79.7 729.1       Intervention/Counseling/Treatment Plan   · Medication Management: The risks and benefits of medication were discussed with the patient. Continue Cymbalta 60mg QDay for depression / fibromyalgia, Continue Adderall 10mg BID for depression offlabel increase motivation  Continue Clonazepam 1mg QHS    Will attempt to decrease narcotics when able.  Patient just now truly responding and getting back to herself.  She was severely unable to function prior to being on this regiment.  Her divorce is now finalizing and she is caring for herself and others.      ·  Counseling provided with patient as follows: importance of compliance with chosen treatment options was emphasized, risks and benefits of treatment options, including medications, were discussed with the patient      Return to Clinic: 2 months

## 2019-03-08 RX ORDER — OMEPRAZOLE 40 MG/1
CAPSULE, DELAYED RELEASE ORAL
Qty: 30 CAPSULE | Refills: 11 | OUTPATIENT
Start: 2019-03-08

## 2019-04-05 ENCOUNTER — OFFICE VISIT (OUTPATIENT)
Dept: PSYCHIATRY | Facility: CLINIC | Age: 54
End: 2019-04-05
Attending: PSYCHIATRY & NEUROLOGY
Payer: MEDICAID

## 2019-04-05 VITALS
WEIGHT: 169.56 LBS | RESPIRATION RATE: 18 BRPM | HEIGHT: 66 IN | BODY MASS INDEX: 27.25 KG/M2 | SYSTOLIC BLOOD PRESSURE: 132 MMHG | DIASTOLIC BLOOD PRESSURE: 80 MMHG | HEART RATE: 82 BPM

## 2019-04-05 DIAGNOSIS — F41.1 GAD (GENERALIZED ANXIETY DISORDER): ICD-10-CM

## 2019-04-05 DIAGNOSIS — M79.7 FIBROMYALGIA SYNDROME: ICD-10-CM

## 2019-04-05 DIAGNOSIS — F33.41 RECURRENT MAJOR DEPRESSIVE DISORDER, IN PARTIAL REMISSION: Primary | ICD-10-CM

## 2019-04-05 PROCEDURE — 99213 PR OFFICE/OUTPT VISIT, EST, LEVL III, 20-29 MIN: ICD-10-PCS | Mod: AF,HB,S$PBB, | Performed by: PSYCHIATRY & NEUROLOGY

## 2019-04-05 PROCEDURE — 90833 PR PSYCHOTHERAPY W/PATIENT W/E&M, 30 MIN (ADD ON): ICD-10-PCS | Mod: AF,HB,S$PBB, | Performed by: PSYCHIATRY & NEUROLOGY

## 2019-04-05 PROCEDURE — 90833 PSYTX W PT W E/M 30 MIN: CPT | Mod: PBBFAC | Performed by: PSYCHIATRY & NEUROLOGY

## 2019-04-05 PROCEDURE — 99213 OFFICE O/P EST LOW 20 MIN: CPT | Mod: PBBFAC | Performed by: PSYCHIATRY & NEUROLOGY

## 2019-04-05 PROCEDURE — 99213 OFFICE O/P EST LOW 20 MIN: CPT | Mod: AF,HB,S$PBB, | Performed by: PSYCHIATRY & NEUROLOGY

## 2019-04-05 PROCEDURE — 90833 PSYTX W PT W E/M 30 MIN: CPT | Mod: AF,HB,S$PBB, | Performed by: PSYCHIATRY & NEUROLOGY

## 2019-04-05 PROCEDURE — 99999 PR PBB SHADOW E&M-EST. PATIENT-LVL III: CPT | Mod: PBBFAC,,, | Performed by: PSYCHIATRY & NEUROLOGY

## 2019-04-05 PROCEDURE — 99999 PR PBB SHADOW E&M-EST. PATIENT-LVL III: ICD-10-PCS | Mod: PBBFAC,,, | Performed by: PSYCHIATRY & NEUROLOGY

## 2019-04-05 RX ORDER — DULOXETIN HYDROCHLORIDE 60 MG/1
60 CAPSULE, DELAYED RELEASE ORAL DAILY
Qty: 90 CAPSULE | Refills: 0 | Status: SHIPPED | OUTPATIENT
Start: 2019-04-05 | End: 2019-06-11 | Stop reason: SDUPTHER

## 2019-04-05 NOTE — PROGRESS NOTES
Outpatient Psychiatry Follow-Up Visit (MD/NP)    4/5/2019    Clinical Status of Patient:  Outpatient (Ambulatory)    Chief Complaint:  Aleah Hagan is a 53 y.o. female who presents today for follow-up of depression and anxiety.  Met with patient.      Interval History and Content of Current Session:  Interim Events/Subjective Report/Content of Current Session:       Current Medication  Clonazepam 1mg QHS   Adderall XR 10mg BID (only using as needed)  Cymbalta 60mg QDay    Exercise  Stays active working at her home      2/5/19  Patient is doing relatively well.  She continues to have a child and grandchild at home.   Her home is chaos to her and it makes her feel more pain.  She continues to have pseudoseizures about once per week.  When she describes them they sound like fitful naps.  She continues to make more progress with her divorce than prior to starting stimulants.      4/5  She continues to do well on current medicine.  Her  is doing more to help her and she has a court date at the end of April.  She has recently been working in her yard extensively.  She would normally need to take a lot of time off of working.  She continues to watch two grandchildren.  She hasn't had a pseudoseizure for three weeks.  She is only using adderall when shes doing paperwork and rarely uses clonazepam.  She doesn't need refills of either at this point.  She is inheriting 1.2 million dollars     Improved Symptoms of Depression: no diminished mood or improved loss of interest/anhedonia; irritability, varied diminished energy, improved change in sleep, improved change in appetite, improving diminished concentration or cognition or indecisiveness, PMA/R, less excessive guilt or hopelessness or worthlessness, denies suicidal ideations     She has a lot of stress but is coping a lot better than she used to be    Improved Changes in Sleep: less trouble with initiation, +maintenance, +early morning awakening with inability  to return to sleep, hypersomnolence     Sleeping much better now     Denies Suicidal/Homicidal ideations: no active/passive ideations, organized plans, future intentions     No psychosis or soraya     Improving Symptoms of ALFREDA: less excessive anxiety/worry/fear, +more days than not, +about numerous issues, improved difficult to control, +with restlessness, +fatigue, less poor concentration, less irritability, +muscle tension, less sleep disturbance; less causes functionally impairing distress      Taking clonazepam only seldom.      Continued Symptoms of Panic Disorder: less recurrent panic attacks (palpitations/heart racing, sweating, shakiness, dyspnea, choking, chest pain/discomfort, Gi symptoms, dizzy/lightheadedness, hot/col flashes, paresthesias, derealization, fear of losing control or fear of dying), +precipitated or un-precipitated, source of worry and/or behavioral changes secondary, with or without agoraphobia    Panic attacks continue to be less often    Psychotherapy:  · Target symptoms: depression, anxiety   · Why chosen therapy is appropriate versus another modality: relevant to diagnosis  · Outcome monitoring methods: self-report, observation  · Therapeutic intervention type: behavior modifying psychotherapy, supportive psychotherapy  · Topics discussed/themes: relationships difficulties, stress related to medical comorbidities, difficulty managing affect in interpersonal relationships, building skills sets for symptom management, symptom recognition  · The patient's response to the intervention is accepting. The patient's progress toward treatment goals is limited.   · Duration of intervention: 25 minutes.    We discussed many psychosocial stressors in a supportive way. Discussed budgeting / proper financial management      Review of Systems   · PSYCHIATRIC: Pertinant items are noted in the narrative.  · CONSTITUTIONAL: No weight gain or loss.   · MUSCULOSKELETAL: has chronic pain from fibromyalgia  "worsening this appointment -- continued  · NEUROLOGIC: possible pseudoseizures  · RESPIRATORY: No shortness of breath.  · CARDIOVASCULAR: No tachycardia or chest pain.  · GASTROINTESTINAL: No nausea, vomiting, pain, constipation or diarrhea.  · GENITOURINARY: No frequency, dysuria or sexual dysfunction.  · HEMATOLOGIC/LYMPHATIC: No excessive bleeding, prolonged or excessive bleeding after dental extraction/injury.    Past Medical, Family and Social History: The patient's past medical, family and social history have been reviewed and updated as appropriate within the electronic medical record - see encounter notes.    Compliance: yes, somewhat worsened by missed appointments    Side effects: None    Risk Parameters:  Patient reports no suicidal ideation  Patient reports no homicidal ideation  Patient reports no self-injurious behavior  Patient reports no violent behavior    Exam (detailed: at least 9 elements; comprehensive: all 15 elements)   Constitutional  Vitals:  Most recent vital signs, dated less than 90 days prior to this appointment, were reviewed.   Vitals:    04/05/19 1350   BP: 132/80   Pulse: 82   Resp: 18   Weight: 76.9 kg (169 lb 8.5 oz)   Height: 5' 6" (1.676 m)        General:  unremarkable, age appropriate     Musculoskeletal  Muscle Strength/Tone:  no dystonia   Gait & Station:  non-ataxic     Psychiatric  Speech:  no latency; no press   Mood & Affect:  steady, euthymic  congruent and appropriate   Thought Process:  normal and logical   Associations:  intact   Thought Content:  normal, no suicidality, no homicidality, delusions, or paranoia   Insight:  has awareness of illness   Judgement: behavior is adequate to circumstances   Orientation:  grossly intact   Memory: intact for content of interview   Language: grossly intact   Attention Span & Concentration:  able to focus   Fund of Knowledge:  intact and appropriate to age and level of education     Assessment and Diagnosis   Status/Progress: " Based on the examination today, the patient's problem(s) is/are improved.  New problems have been presented today.   Co-morbidities are complicating management of the primary condition.  There are no active rule-out diagnoses for this patient at this time.     General Impression:       ICD-10-CM ICD-9-CM   1. Recurrent major depressive disorder, in partial remission F33.41 296.35   2. ALFREDA (generalized anxiety disorder) F41.1 300.02   3. Fibromyalgia syndrome M79.7 729.1       Intervention/Counseling/Treatment Plan      Depression  Cymbalta 60mg QDay  Counseled    Anxiety  Cymbalta  Counseled    Fibromyalgia  Cymbalta    Discussed diagnosis, risks and benefits of proposed treatment vs alternative treatments vs no treatment, and potential side effects of these treatments. The patient expresses understanding of the above and displays the capacity to agree with this treatment given said understanding. Patient also agrees that, currently, the benefits outweigh the risks and would like to pursue treatment at this time.    Checked LA  and no irregularities were noted.    Stopped clonazepam and adderall for now        Return to Clinic: 1 month

## 2019-05-14 ENCOUNTER — TELEPHONE (OUTPATIENT)
Dept: PSYCHIATRY | Facility: CLINIC | Age: 54
End: 2019-05-14

## 2019-05-14 NOTE — TELEPHONE ENCOUNTER
----- Message from Nadege Ricardo sent at 5/14/2019 12:17 PM CDT -----  Contact: Self  Pt made contact via [facility] as follows:    ----- Message from [facility], System Message sent at 5/13/2019  2:40 PM CDT -----    Appointment Request From: Aleah Hagan    With Provider: Ej White MD [[facility] Psychiatry]    Preferred Date Range: 5/13/2019 - 5/17/2019    Preferred Times: Monday Afternoon, Tuesday Afternoon, Wednesday Afternoon, Thursday Afternoon, Friday Afternoon    Reason for visit: Existing Patient    Comments:  Need to talk    Thank you.

## 2019-05-14 NOTE — TELEPHONE ENCOUNTER
States her PCP gave her Buspar 7.5 MG. She is asking if this is a medication can be taken with her current medications from us?     Please advise.

## 2019-06-11 ENCOUNTER — OFFICE VISIT (OUTPATIENT)
Dept: PSYCHIATRY | Facility: CLINIC | Age: 54
End: 2019-06-11
Attending: PSYCHIATRY & NEUROLOGY
Payer: MEDICAID

## 2019-06-11 VITALS
BODY MASS INDEX: 26.69 KG/M2 | SYSTOLIC BLOOD PRESSURE: 122 MMHG | RESPIRATION RATE: 20 BRPM | DIASTOLIC BLOOD PRESSURE: 80 MMHG | WEIGHT: 170.06 LBS | HEART RATE: 96 BPM | HEIGHT: 67 IN

## 2019-06-11 DIAGNOSIS — M79.7 FIBROMYALGIA SYNDROME: ICD-10-CM

## 2019-06-11 DIAGNOSIS — F40.01 PANIC DISORDER WITH AGORAPHOBIA: ICD-10-CM

## 2019-06-11 DIAGNOSIS — F33.0 MILD EPISODE OF RECURRENT MAJOR DEPRESSIVE DISORDER: ICD-10-CM

## 2019-06-11 DIAGNOSIS — F41.1 GAD (GENERALIZED ANXIETY DISORDER): Primary | ICD-10-CM

## 2019-06-11 PROCEDURE — 99213 PR OFFICE/OUTPT VISIT, EST, LEVL III, 20-29 MIN: ICD-10-PCS | Mod: AF,HB,S$PBB, | Performed by: PSYCHIATRY & NEUROLOGY

## 2019-06-11 PROCEDURE — 99212 OFFICE O/P EST SF 10 MIN: CPT | Mod: PBBFAC | Performed by: PSYCHIATRY & NEUROLOGY

## 2019-06-11 PROCEDURE — 99999 PR PBB SHADOW E&M-EST. PATIENT-LVL II: CPT | Mod: PBBFAC,,, | Performed by: PSYCHIATRY & NEUROLOGY

## 2019-06-11 PROCEDURE — 99999 PR PBB SHADOW E&M-EST. PATIENT-LVL II: ICD-10-PCS | Mod: PBBFAC,,, | Performed by: PSYCHIATRY & NEUROLOGY

## 2019-06-11 PROCEDURE — 90833 PSYTX W PT W E/M 30 MIN: CPT | Mod: AF,HB,S$PBB, | Performed by: PSYCHIATRY & NEUROLOGY

## 2019-06-11 PROCEDURE — 99213 OFFICE O/P EST LOW 20 MIN: CPT | Mod: AF,HB,S$PBB, | Performed by: PSYCHIATRY & NEUROLOGY

## 2019-06-11 PROCEDURE — 90833 PR PSYCHOTHERAPY W/PATIENT W/E&M, 30 MIN (ADD ON): ICD-10-PCS | Mod: AF,HB,S$PBB, | Performed by: PSYCHIATRY & NEUROLOGY

## 2019-06-11 RX ORDER — DULOXETIN HYDROCHLORIDE 60 MG/1
60 CAPSULE, DELAYED RELEASE ORAL DAILY
Qty: 90 CAPSULE | Refills: 0 | Status: SHIPPED | OUTPATIENT
Start: 2019-06-11 | End: 2020-01-28

## 2019-06-11 RX ORDER — BUSPIRONE HYDROCHLORIDE 7.5 MG/1
7.5 TABLET ORAL DAILY
COMMUNITY
End: 2021-10-22

## 2019-06-11 RX ORDER — DEXTROAMPHETAMINE SACCHARATE, AMPHETAMINE ASPARTATE, DEXTROAMPHETAMINE SULFATE AND AMPHETAMINE SULFATE 2.5; 2.5; 2.5; 2.5 MG/1; MG/1; MG/1; MG/1
10 TABLET ORAL 2 TIMES DAILY
Qty: 60 TABLET | Refills: 0 | Status: SHIPPED | OUTPATIENT
Start: 2019-06-11 | End: 2019-08-19 | Stop reason: SDUPTHER

## 2019-06-11 NOTE — PROGRESS NOTES
Outpatient Psychiatry Follow-Up Visit (MD/NP)    6/11/2019    Clinical Status of Patient:  Outpatient (Ambulatory)    Chief Complaint:  Aleah Hagan is a 53 y.o. female who presents today for follow-up of depression and anxiety.  Met with patient.      Interval History and Content of Current Session:  Interim Events/Subjective Report/Content of Current Session:       Current Medication  Clonazepam 1mg QHS   Adderall XR 10mg BID (only using as needed)  Cymbalta 60mg QDay    Exercise  Stays active working at her home      2/5/19  Patient is doing relatively well.  She continues to have a child and grandchild at home.   Her home is chaos to her and it makes her feel more pain.  She continues to have pseudoseizures about once per week.  When she describes them they sound like fitful naps.  She continues to make more progress with her divorce than prior to starting stimulants.      4/5  She continues to do well on current medicine.  Her  is doing more to help her and she has a court date at the end of April.  She has recently been working in her yard extensively.  She would normally need to take a lot of time off of working.  She continues to watch two grandchildren.  She hasn't had a pseudoseizure for three weeks.  She is only using adderall when shes doing paperwork and rarely uses clonazepam.  She doesn't need refills of either at this point.  She is inheriting 1.2 million dollars     6/11  Patient continues to have stress related to her divorce.  She continues to have issues with pseudoseizures, up to two in one day.  She has been given a lot of paperwork by her ex-husbands .  She is functioning much better than she used to be.      Varied Symptoms of Depression: + diminished mood or improved loss of interest/anhedonia; irritability, varied diminished energy, improved change in sleep, improved change in appetite, improving diminished concentration or cognition or indecisiveness, PMA/R, less  excessive guilt or hopelessness or worthlessness, denies suicidal ideations     She has a lot of stress but is coping a lot better than she used to be    Improved Changes in Sleep: less trouble with initiation, +maintenance, +early morning awakening with inability to return to sleep, hypersomnolence     Sleeping much better now     Denies Suicidal/Homicidal ideations: no active/passive ideations, organized plans, future intentions     No psychosis or soraya     Continued Symptoms of ALFREDA: less excessive anxiety/worry/fear, +more days than not, +about numerous issues, improved difficult to control, +with restlessness, +fatigue, less poor concentration, less irritability, +muscle tension, less sleep disturbance; less causes functionally impairing distress      Taking clonazepam only seldom.      Continued Symptoms of Panic Disorder: less recurrent panic attacks (palpitations/heart racing, sweating, shakiness, dyspnea, choking, chest pain/discomfort, Gi symptoms, dizzy/lightheadedness, hot/col flashes, paresthesias, derealization, fear of losing control or fear of dying), +precipitated or un-precipitated, source of worry and/or behavioral changes secondary, with or without agoraphobia    Panic attacks continue to be less often    Psychotherapy:  · Target symptoms: depression, anxiety   · Why chosen therapy is appropriate versus another modality: relevant to diagnosis  · Outcome monitoring methods: self-report, observation  · Therapeutic intervention type: behavior modifying psychotherapy, supportive psychotherapy  · Topics discussed/themes: relationships difficulties, stress related to medical comorbidities, difficulty managing affect in interpersonal relationships, building skills sets for symptom management, symptom recognition  · The patient's response to the intervention is accepting. The patient's progress toward treatment goals is limited.   · Duration of intervention: 25 minutes.    We discussed many psychosocial  "stressors in a supportive way. Discussed many legal issues    Review of Systems   · PSYCHIATRIC: Pertinant items are noted in the narrative.  · CONSTITUTIONAL: No weight gain or loss.   · MUSCULOSKELETAL: has chronic pain from fibromyalgia worsening this appointment -- continued  · NEUROLOGIC: possible pseudoseizures  · RESPIRATORY: No shortness of breath.  · CARDIOVASCULAR: No tachycardia or chest pain.  · GASTROINTESTINAL: No nausea, vomiting, pain, constipation or diarrhea.  · GENITOURINARY: No frequency, dysuria or sexual dysfunction.  · HEMATOLOGIC/LYMPHATIC: No excessive bleeding, prolonged or excessive bleeding after dental extraction/injury.    Past Medical, Family and Social History: The patient's past medical, family and social history have been reviewed and updated as appropriate within the electronic medical record - see encounter notes.    Compliance: yes, somewhat worsened by missed appointments    Side effects: None    Risk Parameters:  Patient reports no suicidal ideation  Patient reports no homicidal ideation  Patient reports no self-injurious behavior  Patient reports no violent behavior    Exam (detailed: at least 9 elements; comprehensive: all 15 elements)   Constitutional  Vitals:  Most recent vital signs, dated less than 90 days prior to this appointment, were reviewed.   Vitals:    06/11/19 1354   BP: 122/80   Pulse: 96   Resp: 20   Weight: 77.2 kg (170 lb 1.4 oz)   Height: 5' 7" (1.702 m)        General:  unremarkable, age appropriate     Musculoskeletal  Muscle Strength/Tone:  no dystonia   Gait & Station:  non-ataxic     Psychiatric  Speech:  no latency; no press   Mood & Affect:  angry, anxious  congruent and appropriate   Thought Process:  normal and logical   Associations:  intact   Thought Content:  normal, no suicidality, no homicidality, delusions, or paranoia   Insight:  has awareness of illness   Judgement: behavior is adequate to circumstances   Orientation:  grossly intact "   Memory: intact for content of interview   Language: grossly intact   Attention Span & Concentration:  able to focus   Fund of Knowledge:  intact and appropriate to age and level of education     Assessment and Diagnosis   Status/Progress: Based on the examination today, the patient's problem(s) is/are adequately but not ideally controlled and worsening.  New problems have not been presented today.   Co-morbidities are complicating management of the primary condition.  There are no active rule-out diagnoses for this patient at this time.     General Impression:       ICD-10-CM ICD-9-CM   1. ALFREDA (generalized anxiety disorder) F41.1 300.02   2. Mild episode of recurrent major depressive disorder F33.0 296.31   3. Fibromyalgia syndrome M79.7 729.1   4. Panic disorder with agoraphobia F40.01 300.21       Intervention/Counseling/Treatment Plan      Depression  Cymbalta 60mg QDay  Using Adderall 10mg BID PRN offlabel for mood - has done well    Counseled    Anxiety  Cymbalta  Counseled    Fibromyalgia  Cymbalta  Counseled     Discussed diagnosis, risks and benefits of proposed treatment vs alternative treatments vs no treatment, and potential side effects of these treatments. The patient expresses understanding of the above and displays the capacity to agree with this treatment given said understanding. Patient also agrees that, currently, the benefits outweigh the risks and would like to pursue treatment at this time.    Checked LA  and no irregularities were noted.      Return to Clinic: 1 month

## 2019-07-30 ENCOUNTER — OFFICE VISIT (OUTPATIENT)
Dept: NEUROLOGY | Facility: CLINIC | Age: 54
End: 2019-07-30
Payer: MEDICAID

## 2019-07-30 VITALS
DIASTOLIC BLOOD PRESSURE: 90 MMHG | RESPIRATION RATE: 16 BRPM | BODY MASS INDEX: 27.75 KG/M2 | SYSTOLIC BLOOD PRESSURE: 124 MMHG | HEIGHT: 67 IN | WEIGHT: 176.81 LBS | HEART RATE: 80 BPM

## 2019-07-30 DIAGNOSIS — M79.7 FIBROMYALGIA: ICD-10-CM

## 2019-07-30 DIAGNOSIS — M79.602 BILATERAL ARM PAIN: ICD-10-CM

## 2019-07-30 DIAGNOSIS — F44.9 CONVERSION DISORDER: ICD-10-CM

## 2019-07-30 DIAGNOSIS — R94.01 ABNORMAL EEG: ICD-10-CM

## 2019-07-30 DIAGNOSIS — M79.601 BILATERAL ARM PAIN: ICD-10-CM

## 2019-07-30 DIAGNOSIS — E53.8 B12 DEFICIENCY: ICD-10-CM

## 2019-07-30 DIAGNOSIS — M54.2 NECK PAIN: ICD-10-CM

## 2019-07-30 DIAGNOSIS — M54.50 LUMBAR PAIN: ICD-10-CM

## 2019-07-30 DIAGNOSIS — M79.605 BILATERAL LEG PAIN: ICD-10-CM

## 2019-07-30 DIAGNOSIS — F44.5 PSEUDOSEIZURES: Primary | ICD-10-CM

## 2019-07-30 DIAGNOSIS — E55.9 VITAMIN D DEFICIENCY: ICD-10-CM

## 2019-07-30 DIAGNOSIS — M79.604 BILATERAL LEG PAIN: ICD-10-CM

## 2019-07-30 DIAGNOSIS — F41.1 GAD (GENERALIZED ANXIETY DISORDER): ICD-10-CM

## 2019-07-30 PROCEDURE — 99214 OFFICE O/P EST MOD 30 MIN: CPT | Mod: PBBFAC | Performed by: NURSE PRACTITIONER

## 2019-07-30 PROCEDURE — 99214 PR OFFICE/OUTPT VISIT, EST, LEVL IV, 30-39 MIN: ICD-10-PCS | Mod: S$PBB,,, | Performed by: NURSE PRACTITIONER

## 2019-07-30 PROCEDURE — 99214 OFFICE O/P EST MOD 30 MIN: CPT | Mod: S$PBB,,, | Performed by: NURSE PRACTITIONER

## 2019-07-30 PROCEDURE — 99999 PR PBB SHADOW E&M-EST. PATIENT-LVL IV: ICD-10-PCS | Mod: PBBFAC,,, | Performed by: NURSE PRACTITIONER

## 2019-07-30 PROCEDURE — 99999 PR PBB SHADOW E&M-EST. PATIENT-LVL IV: CPT | Mod: PBBFAC,,, | Performed by: NURSE PRACTITIONER

## 2019-07-30 NOTE — PROGRESS NOTES
"HPI: Aleah Hagan is a 53 y.o. female with fibromyalgia and mild  Lumbar facet arthropathy (by MRI L spine 2012) with multiple areas of pain. Suspected pseudoseizure episodes. Ambulatory EEG in 2018 showed "mild, non-specific cerebral dysfunction in the left temporal region". She is pending consult with Epileptology, and is on a waiting list for this. Mild CAMMIE on sleep study 2018. Depression followed by Dr. White.     She presents today for a follow up visit. She was referred to Epileptology at her last visit for evaluation of her suspected pseudospells, given the small possible abnormality on her prior EEG; however, she was never contacted by [facility] to schedule.     She is still in the process of getting a divorce, and has ongoing stress with this. She continues with spells of greying vision and "getting knocked out" after increased pain or increased physical activity. She never has a spell without increased pain or physical activity, which causes her increased pain.      Dr. Burnett reportedly placed her on Buspar for her anxiety and spells, but this is ineffective and she is no longer taking this. She does see Psychiatry at [place]. She has declined counseling, and instead does "Hinduism meditation".     She continues with severe myalgic complaints, with her no radicular lumbar pain being her greatest complaints. [facility], which were overall ineffective. She has tried multiple medications for her pain without relief, as well as trigger point injections. She is interested in consulting with another pain management provider, but has not yet found a provider who will take her insurance. Dry needling per PT ineffective prior. Massage helps for a few days at a time.     She was able to obtain a CPAP for her CAMMIE, and she is sleeping better now.       Review of Systems   Constitutional: Negative for fever.   HENT: Negative for nosebleeds.  "   Eyes: Negative for double vision.   Respiratory: Negative for hemoptysis.    Cardiovascular: Negative for leg swelling.   Gastrointestinal: Negative for blood in stool.   Genitourinary: Negative for hematuria.   Musculoskeletal: Positive for back pain, myalgias and neck pain.   Skin: Negative for rash.   Neurological: Negative for tremors.        Pseudoseizures   Psychiatric/Behavioral: Negative for depression.     Exam:  Gen Appearance, well developed/nourished in no apparent distress  CV: 2+ distal pulses with no edema or swelling  Neuro:  MS: Awake, alert, Sustains attention. Recent/remote memory intact, Language is full to spontaneous speech/comprehension. Fund of Knowledge is full.   Mood is euthymic  CN: Optic discs are flat with normal vasculature, PERRL, Extraoccular movements and visual fields are full. Normal facial sensation and strength, Hearing symmetric, Tongue and Palate are midline and strong. Shoulder Shrug symmetric and strong.  Motor: Normal bulk, tone, no abnormal movements. 5/5 strength bilateral upper/lower extremities with 2+ reflexes  Sensory: symmetric to  Temp,  and vibration Romberg negative  Cerebellar: Heal to shin, Finger to nose, Rapid alternating movements intact  Gait: Normal stance, no ataxia  MSK survey: Multiple tender points.     Imaging 10/1/13 MRI Brain:    IMPRESSION:   UNREMARKABLE MRI OF THE BRAIN SPECIFICALLY WITHOUT   EVIDENCE FOR ACUTE INFARCTION OR ENHANCING LESION.   CLINICAL CORRELATION AND FURTHER EVALUATION AS   WARRANTED.     CT head 1/2018:   Unremarkable noncontrast CT head specifically without evidence for acute intracranial hemorrhage. Further evaluation as warrented clinically.    5/2018 EEG:    INTERPRETATION:  EEG obtained on this awake and drowsy patient is within  normal limits.  Beta artifact may be a medication effect.  No subclinical  seizures or epileptiform features are appreciated; however, please keep in  mind that a normal EEG does not preclude a  diagnosis of epilepsy and  clinical correlation is advised.     MRI  Brain: 2018:   No acute abnormality.    Partial opacification of the bilateral mastoid air cells, left greater than right    Labs:   2018 TSH normal. Vit D low normal    Assessment/Plan: Aleah Hagan is a 53 y.o. female with  Fibromyalgia and mild  Lumbar facet arthropathy (by MRI L spine 2012) with multiple areas of pain.        I recommend:  1. Suspect myalgia complaints to be part of her conversion disorder, which is suspected and was discussed today, which may be why her pain has been so difficult to treat thus far; however, I will  Update her MRI C-spine and L-spine at this time and try to refer her to see Dr. Bullock in [place], who is a provider who accepts her insurance, as she does have C-spine and L-spine complaints, in addition to her diffuse myalgia.   2. Given, back brace prior but had increased pain and could not tolerate it.   3. She was diagnosed with FM per her second Rheumatology consult, who suggested Flexeril, massage, aqua aerobics. Flexeril  D/c'ed due to concern for sedation by Psychiatry prior but she can resume as she is off other meds unless further sedation. Unsure if Lyrica helped prior, but couldn't tolerate doses higher than 50 mg bid. Gabapentin ineffective prior. She failed cervical TPI's prior, but is unsure if this was ineffective, due to her level of stress at the time. Further Pamelor will be avoided given her psychiatric treatment, ongoing, Neuropathic cream given for fibro pain not helpful. Elavil worsened constipation. Had some relief with dry needling prior. She has not felt that any prevention medication including cymbalta has helped her pain long term.   4. Episodes continue to be suggestive of pseudoseizures, as episodes are triggered by stress or increased fibromyalgia pain; however, given the mild non-specific cerebral dysfunction noted in the left temporal region on ambulatory EEG from 6/2018, she  "was referred to Epileptology for consideration of EMU monitoring and is on a waiting wist for this at [facility] [place]. I will refer her to [facility], as she has not been able to be seen by [facility]. No episodes occurred during ambulatory EEG, as there was no stress or increased fibromyalgia pain at the time. Repeat MRI Brain per epilepsy protocol was unremarkable in 2018.   5. She was started on Vitamin D for lower normal levels in 2018.   6. Continue long term treatment of anxiety and depression with psychiatry, Dr White. Discussed that counseling and Psychiatric care were best methods to improve conversion disorder over time.    7. I advised that she not drive until her suspected pseudoseizures resolve for 6 months.   8. Continue compliance with CPAP for CAMMIE. Mild CAMMIE on Sleep study 2018.     RTC 6 months    "note will not be shared"    "

## 2019-08-07 ENCOUNTER — HOSPITAL ENCOUNTER (OUTPATIENT)
Dept: RADIOLOGY | Facility: HOSPITAL | Age: 54
Discharge: HOME | End: 2019-08-07
Attending: NURSE PRACTITIONER
Payer: MEDICAID

## 2019-08-07 DIAGNOSIS — M54.2 NECK PAIN: ICD-10-CM

## 2019-08-07 DIAGNOSIS — M79.605 BILATERAL LEG PAIN: ICD-10-CM

## 2019-08-07 DIAGNOSIS — M79.602 BILATERAL ARM PAIN: ICD-10-CM

## 2019-08-07 DIAGNOSIS — M54.2 NECK PAIN: Primary | ICD-10-CM

## 2019-08-07 DIAGNOSIS — M54.50 LUMBAR PAIN: ICD-10-CM

## 2019-08-07 DIAGNOSIS — M79.604 BILATERAL LEG PAIN: ICD-10-CM

## 2019-08-07 DIAGNOSIS — M79.601 BILATERAL ARM PAIN: ICD-10-CM

## 2019-08-07 PROCEDURE — 72141 MRI CERVICAL SPINE WITHOUT CONTRAST: ICD-10-PCS | Mod: 26,,, | Performed by: RADIOLOGY

## 2019-08-07 PROCEDURE — 72148 MRI LUMBAR SPINE WITHOUT CONTRAST: ICD-10-PCS | Mod: 26,,, | Performed by: RADIOLOGY

## 2019-08-07 PROCEDURE — 72148 MRI LUMBAR SPINE W/O DYE: CPT | Mod: 26,,, | Performed by: RADIOLOGY

## 2019-08-07 PROCEDURE — 72148 MRI LUMBAR SPINE W/O DYE: CPT | Mod: TC

## 2019-08-07 PROCEDURE — 72141 MRI NECK SPINE W/O DYE: CPT | Mod: 26,,, | Performed by: RADIOLOGY

## 2019-08-07 PROCEDURE — 72141 MRI NECK SPINE W/O DYE: CPT | Mod: TC

## 2019-08-08 DIAGNOSIS — E04.2 MULTIPLE THYROID NODULES: ICD-10-CM

## 2019-08-08 DIAGNOSIS — K76.9 LIVER LESION: Primary | ICD-10-CM

## 2019-08-16 ENCOUNTER — LAB VISIT (OUTPATIENT)
Dept: LAB | Facility: HOSPITAL | Age: 54
End: 2019-08-16
Attending: NURSE PRACTITIONER
Payer: MEDICAID

## 2019-08-16 DIAGNOSIS — E55.9 VITAMIN D DEFICIENCY: ICD-10-CM

## 2019-08-16 DIAGNOSIS — E53.8 B12 DEFICIENCY: ICD-10-CM

## 2019-08-16 PROCEDURE — 82607 VITAMIN B-12: CPT

## 2019-08-16 PROCEDURE — 36415 COLL VENOUS BLD VENIPUNCTURE: CPT

## 2019-08-16 PROCEDURE — 82306 VITAMIN D 25 HYDROXY: CPT

## 2019-08-17 LAB
25(OH)D3+25(OH)D2 SERPL-MCNC: 32 NG/ML (ref 30–96)
VIT B12 SERPL-MCNC: 1055 PG/ML (ref 210–950)

## 2019-08-19 RX ORDER — OMEPRAZOLE 40 MG/1
40 CAPSULE, DELAYED RELEASE ORAL DAILY
Qty: 30 CAPSULE | Refills: 2 | Status: SHIPPED | OUTPATIENT
Start: 2019-08-19 | End: 2022-12-06

## 2019-08-19 RX ORDER — DEXTROAMPHETAMINE SACCHARATE, AMPHETAMINE ASPARTATE, DEXTROAMPHETAMINE SULFATE AND AMPHETAMINE SULFATE 2.5; 2.5; 2.5; 2.5 MG/1; MG/1; MG/1; MG/1
10 TABLET ORAL 2 TIMES DAILY PRN
Qty: 60 TABLET | Refills: 0 | Status: SHIPPED | OUTPATIENT
Start: 2019-08-19 | End: 2020-01-28

## 2019-08-20 ENCOUNTER — HOSPITAL ENCOUNTER (OUTPATIENT)
Dept: RADIOLOGY | Facility: HOSPITAL | Age: 54
Discharge: HOME | End: 2019-08-20
Attending: NURSE PRACTITIONER
Payer: MEDICAID

## 2019-08-20 DIAGNOSIS — E04.2 MULTIPLE THYROID NODULES: ICD-10-CM

## 2019-08-20 DIAGNOSIS — K76.9 LIVER LESION: ICD-10-CM

## 2019-08-20 PROCEDURE — 76536 US EXAM OF HEAD AND NECK: CPT | Mod: TC

## 2019-08-20 PROCEDURE — 76536 US EXAM OF HEAD AND NECK: CPT | Mod: 26,,, | Performed by: RADIOLOGY

## 2019-08-20 PROCEDURE — 76705 US ABDOMEN LIMITED: ICD-10-PCS | Mod: 26,,, | Performed by: RADIOLOGY

## 2019-08-20 PROCEDURE — 76705 ECHO EXAM OF ABDOMEN: CPT | Mod: TC

## 2019-08-20 PROCEDURE — 76705 ECHO EXAM OF ABDOMEN: CPT | Mod: 26,,, | Performed by: RADIOLOGY

## 2019-08-20 PROCEDURE — 76536 US THYROID: ICD-10-PCS | Mod: 26,,, | Performed by: RADIOLOGY

## 2019-08-22 PROBLEM — Z12.11 SCREEN FOR COLON CANCER: Status: ACTIVE | Noted: 2019-08-22

## 2019-09-03 RX ORDER — OMEPRAZOLE 40 MG/1
40 CAPSULE, DELAYED RELEASE ORAL DAILY
Qty: 30 CAPSULE | Refills: 2 | OUTPATIENT
Start: 2019-09-03 | End: 2020-09-02

## 2019-09-05 ENCOUNTER — HOSPITAL ENCOUNTER (OUTPATIENT)
Dept: RADIOLOGY | Facility: HOSPITAL | Age: 54
Discharge: HOME | End: 2019-09-05
Attending: FAMILY MEDICINE
Payer: MEDICAID

## 2019-09-05 DIAGNOSIS — R89.9 ABNORMAL LABORATORY TEST RESULT: ICD-10-CM

## 2019-09-06 ENCOUNTER — HOSPITAL ENCOUNTER (OUTPATIENT)
Dept: RADIOLOGY | Facility: HOSPITAL | Age: 54
Discharge: HOME | End: 2019-09-06
Attending: FAMILY MEDICINE
Payer: MEDICAID

## 2019-09-06 PROCEDURE — 74177 CT ABD & PELVIS W/CONTRAST: CPT | Mod: TC

## 2019-09-06 PROCEDURE — 74177 CT ABDOMEN PELVIS WITH CONTRAST: ICD-10-PCS | Mod: 26,,, | Performed by: RADIOLOGY

## 2019-09-06 PROCEDURE — 25500020 PHARM REV CODE 255: Performed by: FAMILY MEDICINE

## 2019-09-06 PROCEDURE — 74177 CT ABD & PELVIS W/CONTRAST: CPT | Mod: 26,,, | Performed by: RADIOLOGY

## 2019-09-06 RX ADMIN — IOHEXOL 75 ML: 350 INJECTION, SOLUTION INTRAVENOUS at 09:09

## 2019-09-06 RX ADMIN — IOHEXOL 30 ML: 350 INJECTION, SOLUTION INTRAVENOUS at 09:09

## 2019-09-23 ENCOUNTER — TELEPHONE (OUTPATIENT)
Dept: OBSTETRICS AND GYNECOLOGY | Facility: CLINIC | Age: 54
End: 2019-09-23

## 2019-09-23 DIAGNOSIS — Z12.31 ENCOUNTER FOR SCREENING MAMMOGRAM FOR BREAST CANCER: Primary | ICD-10-CM

## 2019-09-23 NOTE — TELEPHONE ENCOUNTER
----- Message from Flavia Moahmud sent at 9/23/2019  1:51 PM CDT -----  Contact: Self      ----- Message -----  From: Adrianne Morris  Sent: 9/23/2019   1:50 PM CDT  To: Pancho Cruz    Aleah Hagan  MRN: [id number]  Home Phone      [phone]  Work Phone      Not on file.  Mobile          [phone]    Patient Care Team:  Binu Burnett MD as PCP - General (Family Medicine)  Cisco Deleon MD as Obstetrician (Obstetrics and Gynecology)  OB? No  What phone number can you be reached at?   Message:   Please link mammogram orders. Thanks.

## 2019-09-30 ENCOUNTER — OFFICE VISIT (OUTPATIENT)
Dept: OBSTETRICS AND GYNECOLOGY | Facility: CLINIC | Age: 54
End: 2019-09-30
Payer: MEDICAID

## 2019-09-30 VITALS
RESPIRATION RATE: 13 BRPM | SYSTOLIC BLOOD PRESSURE: 122 MMHG | HEIGHT: 67 IN | WEIGHT: 182 LBS | DIASTOLIC BLOOD PRESSURE: 80 MMHG | HEART RATE: 77 BPM | BODY MASS INDEX: 28.56 KG/M2

## 2019-09-30 DIAGNOSIS — Z87.410 HISTORY OF CERVICAL DYSPLASIA: ICD-10-CM

## 2019-09-30 DIAGNOSIS — Z01.419 WELL WOMAN EXAM WITH ROUTINE GYNECOLOGICAL EXAM: Primary | ICD-10-CM

## 2019-09-30 DIAGNOSIS — Z90.710 HISTORY OF HYSTERECTOMY: ICD-10-CM

## 2019-09-30 DIAGNOSIS — N39.41 URGE INCONTINENCE: ICD-10-CM

## 2019-09-30 PROCEDURE — 99213 OFFICE O/P EST LOW 20 MIN: CPT | Mod: PBBFAC | Performed by: OBSTETRICS & GYNECOLOGY

## 2019-09-30 PROCEDURE — 99999 PR PBB SHADOW E&M-EST. PATIENT-LVL III: ICD-10-PCS | Mod: PBBFAC,,, | Performed by: OBSTETRICS & GYNECOLOGY

## 2019-09-30 PROCEDURE — 88175 CYTOPATH C/V AUTO FLUID REDO: CPT

## 2019-09-30 PROCEDURE — 99396 PREV VISIT EST AGE 40-64: CPT | Mod: S$PBB,,, | Performed by: OBSTETRICS & GYNECOLOGY

## 2019-09-30 PROCEDURE — 99396 PR PREVENTIVE VISIT,EST,40-64: ICD-10-PCS | Mod: S$PBB,,, | Performed by: OBSTETRICS & GYNECOLOGY

## 2019-09-30 PROCEDURE — 99999 PR PBB SHADOW E&M-EST. PATIENT-LVL III: CPT | Mod: PBBFAC,,, | Performed by: OBSTETRICS & GYNECOLOGY

## 2019-09-30 NOTE — PROGRESS NOTES
Subjective:    Patient ID: Aleah Hagan is a 54 y.o. y.o. female.     Chief Complaint: Annual Well Woman Exam     History of Present Illness:  Aleah presents today for Annual Well Woman exam. She describes her menses as absent, pt has had a hysterectomy for persistent CIN2.  She denies pelvic pain.  She denies breast tenderness, masses, nipple discharge. She reports difficulty with urination. She has a history of hematuria years ago, but that has resolved.  She now will have random full bladder leakage episodes that are not related to stress or urge.  She was previously established with a urologist, but he retired. She also has intermittent diarrhea and constipation with bowel movements. She admits to possible menopausal symptoms such as hotflashes, vaginal dryness, and night sweats. She denies bloating, early satiety, or weight changes. She is not currently sexually active and reports low sexual desire.       Menstrual History:   Patient's last menstrual period was 2015 (exact date)..     OB History    :  3  Para:  3  Term:  2  :  1  Livin            The following portions of the patient's history were reviewed and updated as appropriate: allergies, current medications, past family history, past medical history, past social history, past surgical history and problem list.    ROS:   CONSTITUTIONAL: fatigue, weight gain, Negative for fever, chills, diaphoresis, weakness, weight loss,   ENT: hearing loss, Denies: sore throat, nasal congestion, nasal discharge, epistaxis, tinnitus  EYES: blurry vision, Denies: eye pain, discharge  SKIN: itching, Denies: rash, hives, acne  RESPIRATORY: negative for cough, hemoptysis, shortness of breath, pleuritic chest pain, wheezing  CARDIOVASCULAR: negative for chest pain, dyspnea on exertion, orthopnea, paroxysmal nocturnal dyspnea, edema, palpitations  BREAST: negative for breast  tenderness, breast mass, nipple discharge, or skin  changes  GASTROINTESTINAL: abdominal pain, diarrhea, constipation, Denies: flank pain, nausea, vomiting, blood in stool  GENITOURINARY: incontinence; negative for abnormal vaginal bleeding, amenorrhea, decreased libido, dysuria, genital sores, hematuria, menorrhagia, pelvic pain, urinary frequency, vaginal discharge  HEMATOLOGIC/LYMPHATIC: negative for swollen lymph nodes, bleeding, bruising  MUSCULOSKELETAL: back pain, joint pain, muscle pain, muscle weakness; negative for  joint swelling,   NEUROLOGICAL: numbness/tingling, seizures, Denies: headache  BEHAVORIAL/PSYCH: No psychosis and Positive for anxiety and depression  ENDOCRINE: hair loss, negative for polydipsia/polyuria, palpitations, skin changes, temperature intolerance, unexpected weight changes  ALLERGIC/IMMUNOLOGIC: negative for urticaria, hay fever, angioedema      Objective:    Vital Signs:  Vitals:    09/30/19 1418   BP: 122/80   Pulse: 77   Resp: 13       Physical Exam:  General:  alert, cooperative, no distress   Skin:  Skin color, texture, turgor normal. No rashes or lesions   HEENT:  extra ocular movement intact, sclera clear, anicteric   Neck: supple, trachea midline, no adenopathy or thyromegally   Respiratory:  Normal effort   Breasts:  no discharge, erythema, tenderness, or palpable masses; no axillary lymphadenopathy   Abdomen:  soft, nontender, no palpable masses   Pelvis: External genitalia: normal general appearance  Urinary system: urethral meatus normal, bladder nontender  Vaginal: normal without tenderness, induration or masses, atrophic mucosa  Cervix: removed surgically  Uterus: removed surgically, normal size, shape, position  Adnexa: normal size, nontender bilaterally   Extremities: Normal ROM; no edema, no cyanosis   Neurologial: Normal strength and tone. No focal numbness or weakness.   Psychiatric: normal mood, speech, dress, and thought processes         Assessment:       Healthy female exam.     1. Well woman exam with routine  gynecological exam    2. History of hysterectomy    3. History of cervical dysplasia    4. Urge incontinence          Plan:      Well woman exam with routine gynecological exam    History of hysterectomy  -     Liquid-based pap smear, screening    History of cervical dysplasia  -     Liquid-based pap smear, screening    Urge incontinence  -     Ambulatory referral to Urology      MMG scheduled for Wednesday.    Medicaid counselors information sheet given. Pt used to see Dr White and is now interested in possible counselor.   Pt used to see urology before Dr. Salgado retired, would like to re-establish with urologist secondary to multiple bladder concerns.    COUNSELING:  Aleah was counseled on [employer].C.O.G. Pap guidelines and recommendations for yearly pelvic exams in addition to recommendations for monthly self breast exams; to see her PCP for other health maintenance.

## 2019-10-02 ENCOUNTER — HOSPITAL ENCOUNTER (OUTPATIENT)
Dept: RADIOLOGY | Facility: HOSPITAL | Age: 54
Discharge: HOME | End: 2019-10-02
Attending: OBSTETRICS & GYNECOLOGY
Payer: MEDICAID

## 2019-10-02 VITALS — WEIGHT: 182 LBS | HEIGHT: 67 IN | BODY MASS INDEX: 28.56 KG/M2

## 2019-10-02 DIAGNOSIS — Z12.31 ENCOUNTER FOR SCREENING MAMMOGRAM FOR BREAST CANCER: ICD-10-CM

## 2019-10-02 PROCEDURE — 77063 BREAST TOMOSYNTHESIS BI: CPT | Mod: TC

## 2019-10-02 PROCEDURE — 77063 MAMMO DIGITAL SCREENING BILAT WITH TOMOSYNTHESIS_CAD: ICD-10-PCS | Mod: 26,,, | Performed by: RADIOLOGY

## 2019-10-02 PROCEDURE — 77063 BREAST TOMOSYNTHESIS BI: CPT | Mod: 26,,, | Performed by: RADIOLOGY

## 2019-10-02 PROCEDURE — 77067 SCR MAMMO BI INCL CAD: CPT | Mod: 26,,, | Performed by: RADIOLOGY

## 2019-10-02 PROCEDURE — 77067 MAMMO DIGITAL SCREENING BILAT WITH TOMOSYNTHESIS_CAD: ICD-10-PCS | Mod: 26,,, | Performed by: RADIOLOGY

## 2019-10-11 ENCOUNTER — TELEPHONE (OUTPATIENT)
Dept: OBSTETRICS AND GYNECOLOGY | Facility: CLINIC | Age: 54
End: 2019-10-11

## 2019-11-05 NOTE — PLAN OF CARE
Cardiology Follow Up    Madiha Chou  1934  [id number]  [address]  [facility] [address]   [phone]    1  CAD in native artery     2  Hypertension, essential, benign     3  Mixed hyperlipidemia         Interval History:   Cardiology follow-up  Patient doing well from a cardiac point of view, denies any chest pain or significant dyspnea  States been compliant with low-cholesterol diet, lipids recently check acceptable control  Compliant low-sodium diet, blood pressures been well control  Denies any orthopnea or PND  Patient also needs clearance for shoulder surgery in next month  She had an EKG recently unchanged from previously      Patient Active Problem List   Diagnosis    Ambulatory dysfunction    H/O right knee surgery    CAD in native artery    History of cardiac catheterization    Hyperlipidemia    Multiple allergies    Bursitis of left hip    Osteoporosis    Esophageal reflux    Hypertension, essential, benign    Class 1 obesity due to excess calories without serious comorbidity with body mass index (BMI) of 32 0 to 32 9 in adult    Ingrowing toenail with infection    Primary osteoarthritis of right shoulder     Past Medical History:   Diagnosis Date    Arthritis     Cardiac disease     Hyperlipidemia     Hypertension     UTI (urinary tract infection) 5/31/2017     Social History     Socioeconomic History    Marital status:      Spouse name: Not on file    Number of children: 3    Years of education: Not on file    Highest education level: Not on file   Occupational History    Occupation: Retired   Social Needs    Financial resource strain: Not on file    Food insecurity:     Worry: Not on file     Inability: Not on file   [employer] needs:     Medical: Not on file     Non-medical: Not on file   Tobacco Use    Smoking status: Never Smoker Problem: Patient Care Overview (Adult)  Goal: Plan of Care Review  Outcome: Ongoing (interventions implemented as appropriate)  Pt in and out of room.Up for meals and activities.Reports awake off and on last night.Appitite good.Pt focused on going home.Pt reports her daughter recently had a baby and her [place] is going to [place] tonight.Denies suicidal thoughts but feels overwhelmed with all the things she feels she needs to do.Medication compliant.        Smokeless tobacco: Never Used   Substance and Sexual Activity    Alcohol use: No     Comment: social alcohol use, per [employer]    Drug use: No    Sexual activity: Not on file   Lifestyle    Physical activity:     Days per week: Not on file     Minutes per session: Not on file    Stress: Not on file   Relationships    Social connections:     Talks on phone: Not on file     Gets together: Not on file     Attends [employer]ism service: Not on file     Active member of club or organization: Not on file     Attends meetings of clubs or organizations: Not on file     Relationship status: Not on file    Intimate partner violence:     Fear of current or ex partner: Not on file     Emotionally abused: Not on file     Physically abused: Not on file     Forced sexual activity: Not on file   Other Topics Concern    Not on file   Social History Narrative    Caffeine use, active    Completed 8th grade    No caffeine use, active, per [employer]      Family History   Problem Relation Age of Onset    Breast cancer Mother 80    Leukemia Mother     Gout Mother     Hypertension Mother     Bone cancer Father 64    Cervical cancer Sister 52    Aneurysm Family     Heart attack Family         myocardial infarction    Rheum arthritis Family     Lung cancer Sister 70     Past Surgical History:   Procedure Laterality Date    APPENDECTOMY      CORONARY ANGIOPLASTY WITH STENT PLACEMENT      CYSTOSCOPY  12/29/2014    Diagnostic    HERNIA REPAIR      JOINT REPLACEMENT      bilat knee, bilat hip, L shoulder    KNEE SURGERY Bilateral     SHOULDER SURGERY  2012    TOTAL HIP ARTHROPLASTY Bilateral        Current Outpatient Medications:     acetaminophen (TYLENOL ARTHRITIS PAIN) 650 mg CR tablet, Take by mouth, Disp: , Rfl:     aspirin 81 MG tablet, Take 81 mg by mouth daily, Disp: , Rfl:     calcium citrate-vitamin D (CITRACAL+D) 315-200 MG-UNIT per tablet, Take 1 tablet by mouth 2 (two) times a day, Disp: , Rfl:    Cholecalciferol (VITAMIN D3) 1000 units CAPS, Take 1 tablet by mouth daily, Disp: , Rfl:     famotidine (PEPCID) 20 mg tablet, TAKE 1 TABLET BY MOUTH EVERY DAY, Disp: 30 tablet, Rfl: 5    Multiple Vitamins-Minerals (CENTRUM ADULTS PO), Take 1 tablet by mouth daily, Disp: , Rfl:     niacin (NIASPAN) 1000 MG CR tablet, TAKE 1 TABLET BY MOUTH EVERY NIGHT AT BEDTIME, Disp: 30 tablet, Rfl: 6    pantoprazole (PROTONIX) 40 mg tablet, TAKE 1 TABLET BY MOUTH EVERY DAY, Disp: 30 tablet, Rfl: 5    simvastatin (ZOCOR) 40 mg tablet, TAKE 1 TABLET BY MOUTH DAILY, Disp: 30 tablet, Rfl: 5    nitroglycerin (NITROSTAT) 0 4 mg SL tablet, Place 1 tablet (0 4 mg total) under the tongue every 5 (five) minutes as needed for chest pain Up to 3 doses    Call 911 if pain persists (Patient not taking: Reported on 11/5/2019), Disp: 25 tablet, Rfl: 0  Allergies   Allergen Reactions    Cephalexin Hives    Ciprofloxacin Hives    Cortisone Headache    Cyclophosphamide Hives    Dexamethasone Hives    Erythromycin Hives    Flu Virus Vaccine Hives    Iodinated Casein     Loratadine     Macrobid  [Nitrofurantoin Monohyd Macro] Hives    Motrin [Ibuprofen] Itching    Mucinex Chest Congestion Child [Guaifenesin] Itching    Nitrofurantoin     Omeprazole     Oxycodone-Acetaminophen Other (See Comments)    Penicillins     Percocet  [Oxycodone-Acetaminophen]     Pneumococcal Vac Polyvalent Hives    Povidone Iodine        Labs:  Appointment on 09/19/2019   Component Date Value    WBC 09/19/2019 6 27     RBC 09/19/2019 3 65*    Hemoglobin 09/19/2019 12 0     Hematocrit 09/19/2019 37 6     MCV 09/19/2019 103*    MCH 09/19/2019 32 9     MCHC 09/19/2019 31 9     RDW 09/19/2019 13 3     MPV 09/19/2019 9 5     Platelets 16/01/9754 166     nRBC 09/19/2019 0     Neutrophils Relative 09/19/2019 42*    Immat GRANS % 09/19/2019 0     Lymphocytes Relative 09/19/2019 44     Monocytes Relative 09/19/2019 11     Eosinophils Relative 09/19/2019 2     Basophils Relative 09/19/2019 1     Neutrophils Absolute 09/19/2019 2 61     Immature Grans Absolute 09/19/2019 0 01     Lymphocytes Absolute 09/19/2019 2 78     Monocytes Absolute 09/19/2019 0 69     Eosinophils Absolute 09/19/2019 0 13     Basophils Absolute 09/19/2019 0 05     Sodium 09/19/2019 140     Potassium 09/19/2019 4 1     Chloride 09/19/2019 105     CO2 09/19/2019 28     ANION GAP 09/19/2019 7     BUN 09/19/2019 12     Creatinine 09/19/2019 0 88     Glucose, Fasting 09/19/2019 103*    Calcium 09/19/2019 9 4     AST 09/19/2019 28     ALT 09/19/2019 19     Alkaline Phosphatase 09/19/2019 107     Total Protein 09/19/2019 6 6     Albumin 09/19/2019 3 5     Total Bilirubin 09/19/2019 0 90     eGFR 09/19/2019 61     Cholesterol 09/19/2019 155     Triglycerides 09/19/2019 73     HDL, Direct 09/19/2019 70*    LDL Calculated 09/19/2019 70     Non-HDL-Chol (CHOL-HDL) 09/19/2019 85      Imaging: Xr Shoulder 2+ Vw Right    Result Date: 11/2/2019  Narrative: RIGHT SHOULDER INDICATION:   M25 511: Pain in right shoulder  COMPARISON:  None VIEWS:  XR SHOULDER 2+ VW RIGHT FINDINGS: There is no acute fracture or dislocation  There is moderately severe osteoarthritis of the glenohumeral joint which is very narrow and demonstrates osteophytic spurring around the humeral head margin  There is also mild osteoarthritis of the acromioclavicular joint  Questionable small intra-articular loose body at the glenohumeral joint with density seen just above the glenoid margin  No lytic or blastic lesions are seen  Soft tissues are unremarkable  Impression: Moderately severe osteoarthritis glenohumeral joint  Mild osteoarthritis acromioclavicular joint  Workstation performed: [id number]       Review of Systems:  Review of Systems   Constitutional: Negative for fatigue  HENT: Negative for nosebleeds  Eyes: Negative for visual disturbance     Respiratory: Negative for apnea, shortness of breath, wheezing and stridor  Cardiovascular: Negative for chest pain, palpitations and leg swelling  Gastrointestinal: Negative for abdominal pain and blood in stool  Endocrine: Negative for cold intolerance  Genitourinary: Positive for frequency  Negative for hematuria  Musculoskeletal: Positive for arthralgias  Negative for gait problem  Skin: Negative for pallor and rash  Allergic/Immunologic: Negative for immunocompromised state  Neurological: Negative for dizziness and syncope  Hematological: Does not bruise/bleed easily  Psychiatric/Behavioral: Positive for sleep disturbance  The patient is not nervous/anxious  Physical Exam:  Physical Exam   Constitutional: She appears well-developed  No distress  Neck: No JVD present  Cardiovascular: Normal rate, regular rhythm, normal heart sounds and intact distal pulses  Exam reveals no gallop and no friction rub  No murmur heard  Pulmonary/Chest: Effort normal and breath sounds normal  No stridor  No respiratory distress  She has no wheezes  She has no rales  Musculoskeletal: She exhibits no edema  Neurological: She is alert  Skin: Skin is warm  Capillary refill takes less than 2 seconds  She is not diaphoretic  Psychiatric: She has a normal mood and affect  Vitals reviewed  Discussion/Summary:  Coronary disease, PTCA stent of the large diagonal 2015, dyspnea with angina equivalent  He had mild-to-moderate RCA and circumflex disease manage medically  Stress test 2016 pharmacological was negative ischemia ejection fraction 60%  An echocardiogram in 2015 revealed normal left systolic function with stage I diastolic function and mild mitral and tricuspid insufficiency with estimated normal pulmonary pressures suggested by Doppler criteria  Lipids this year total cholesterol 155 with an LDL 7 HDL 70, susceptible control  Favor no new interventions  Continue current medications    Patient is clear for shoulder surgery, okay to withhold aspirin therapy for 1 week prior to procedure  This note was completed in part utilizing m-[employer] fluency direct voice recognition software  Grammatical errors, random word insertion, spelling mistakes, and incomplete sentences may be an occasional consequence of the system secondary to software limitations, ambient noise and hardware issues  At the time of dictation, efforts were made to edit, clarify and /or correct errors  Please read the chart carefully and recognize, using context, where substitutions have occurred  If you have any questions or concerns about the context, text or information contained within the body of this dictation, please contact myself, the provider, for further clarification

## 2020-01-28 ENCOUNTER — OFFICE VISIT (OUTPATIENT)
Dept: NEUROLOGY | Facility: CLINIC | Age: 55
End: 2020-01-28
Payer: COMMERCIAL

## 2020-01-28 VITALS
HEIGHT: 67 IN | SYSTOLIC BLOOD PRESSURE: 116 MMHG | RESPIRATION RATE: 16 BRPM | DIASTOLIC BLOOD PRESSURE: 82 MMHG | WEIGHT: 179.88 LBS | HEART RATE: 98 BPM | BODY MASS INDEX: 28.23 KG/M2

## 2020-01-28 DIAGNOSIS — M79.7 FIBROMYALGIA: ICD-10-CM

## 2020-01-28 DIAGNOSIS — M54.2 NECK PAIN: ICD-10-CM

## 2020-01-28 DIAGNOSIS — F44.9 CONVERSION DISORDER: Primary | ICD-10-CM

## 2020-01-28 DIAGNOSIS — F33.2 SEVERE EPISODE OF RECURRENT MAJOR DEPRESSIVE DISORDER, WITHOUT PSYCHOTIC FEATURES: ICD-10-CM

## 2020-01-28 DIAGNOSIS — F44.5 PSEUDOSEIZURES: ICD-10-CM

## 2020-01-28 DIAGNOSIS — M54.6 DISCOGENIC THORACIC PAIN: ICD-10-CM

## 2020-01-28 DIAGNOSIS — M54.6 THORACIC BACK PAIN, UNSPECIFIED BACK PAIN LATERALITY, UNSPECIFIED CHRONICITY: ICD-10-CM

## 2020-01-28 DIAGNOSIS — M54.50 LUMBAR PAIN: ICD-10-CM

## 2020-01-28 PROBLEM — Z12.11 SCREEN FOR COLON CANCER: Status: RESOLVED | Noted: 2019-08-22 | Resolved: 2020-01-28

## 2020-01-28 PROCEDURE — 99214 OFFICE O/P EST MOD 30 MIN: CPT | Mod: S$GLB,,, | Performed by: NURSE PRACTITIONER

## 2020-01-28 PROCEDURE — 3008F PR BODY MASS INDEX (BMI) DOCUMENTED: ICD-10-PCS | Mod: CPTII,S$GLB,, | Performed by: NURSE PRACTITIONER

## 2020-01-28 PROCEDURE — 3008F BODY MASS INDEX DOCD: CPT | Mod: CPTII,S$GLB,, | Performed by: NURSE PRACTITIONER

## 2020-01-28 PROCEDURE — 99999 PR PBB SHADOW E&M-EST. PATIENT-LVL III: CPT | Mod: PBBFAC,,, | Performed by: NURSE PRACTITIONER

## 2020-01-28 PROCEDURE — 99999 PR PBB SHADOW E&M-EST. PATIENT-LVL III: ICD-10-PCS | Mod: PBBFAC,,, | Performed by: NURSE PRACTITIONER

## 2020-01-28 PROCEDURE — 99214 PR OFFICE/OUTPT VISIT, EST, LEVL IV, 30-39 MIN: ICD-10-PCS | Mod: S$GLB,,, | Performed by: NURSE PRACTITIONER

## 2020-01-28 NOTE — PROGRESS NOTES
"HPI: Aleah Hagan is a 54 y.o. female with fibromyalgia and mild  Lumbar facet arthropathy (by MRI L spine 2012) with multiple areas of pain. Suspected pseudoseizure episodes. Ambulatory EEG in 2018 showed "mild, non-specific cerebral dysfunction in the left temporal region". She is pending consult with Epileptology, and is on a waiting list for this. Mild CAMMIE on sleep study 2018. Depression followed by Dr. White.     She presents today for a follow up visit. She continues with stress related to the aftermath of her divorce. She continues to have occasional episodes with overstimulation. This mostly involves severe fatigue. This last occurred after shopping for wedding dresses with her daughter this past week. Previously, she experienced transient alteration of awareness after experiencing physical pain.     She is having trouble sleeping. She is no longer taking Cymbalta. This was originally prescribed by Dr. White.     She has anxiety with her daughter's approaching wedding.     MRI C-spine and L-spine was updated at her last visit, which showed mild degenerative changes. She was referred to Dr. Bullock/Pain management in [place], and was reportedly told that he could "do nothing" for her.     She continues with severe myalgic complaints, as well as "spinal pain". She is interested in going to yoga.     She is CPAP compliant for her CAMMIE.       Review of Systems   Constitutional: Negative for fever.   HENT: Negative for nosebleeds.    Eyes: Negative for double vision.   Respiratory: Negative for hemoptysis.    Cardiovascular: Negative for leg swelling.   Gastrointestinal: Negative for blood in stool.   Genitourinary: Negative for hematuria.   Musculoskeletal: Positive for back pain, myalgias and neck pain.   Skin: Negative for rash.   Neurological: Positive for seizures. Negative for tremors.        Pseudoseizures   Psychiatric/Behavioral: Positive for depression. The patient is nervous/anxious and " has insomnia.      Exam:  Gen Appearance, well developed/nourished in no apparent distress  CV: 2+ distal pulses with no edema or swelling  Neuro:  MS: Awake, alert, Sustains attention. Recent/remote memory intact, Language is full to spontaneous speech/comprehension. Fund of Knowledge is full.   Mood is euthymic  CN: Optic discs are flat with normal vasculature, PERRL, Extraoccular movements and visual fields are full. Normal facial sensation and strength, Hearing symmetric, Tongue and Palate are midline and strong. Shoulder Shrug symmetric and strong.  Motor: Normal bulk, tone, no abnormal movements. 5/5 strength bilateral upper/lower extremities with 2+ reflexes  Sensory: symmetric to  Temp,  and vibration Romberg negative  Cerebellar: Heal to shin, Finger to nose, Rapid alternating movements intact  Gait: Normal stance, no ataxia  MSK survey: Multiple tender points.     Imaging:   MRI C-spine 7/2019:   FINDINGS:  The craniocervical junction is intact.  There is no evidence for a Chiari malformation.  The spinal cord is normal in signal without cord edema or myelomalacia.    Vertebral body alignment and heights are maintained.  There is disc desiccation throughout the cervical spine.  The marrow signal is normal without evidence for a marrow replacement process, infection or tumor.  The incidentally visualized soft tissue structures of the neck appear normal.    At C2-3, no disc herniation, central canal stenosis or neural foraminal narrowing.    At C3-4, no disc herniation, central canal stenosis or neural foraminal narrowing.    At C4-5, no disc herniation, central stenosis or neural foraminal narrowing.    At C5-6, posterior disc osteophyte complex with uncovertebral spurring arthropathy without central canal stenosis or neural foraminal narrowing.    At C6-7, no disc herniation, central canal stenosis or neural foraminal narrowing.    At C7-T1, no disc herniation, central canal stenosis or neural foraminal  narrowing.    Suspected thyroid nodules.      Impression       Mild degenerative changes throughout the cervical spine without evidence for central canal stenosis or neural foraminal narrowing.    Thyroid nodules.  Consider further evaluation with thyroid ultrasound.     7/2019 MRI L-spine:  FINDINGS:  The incidentally visualized soft tissue structures of the abdomen show T2 hyperintense structures in the right hepatic lobe, partially imaged.    Vertebral body alignment and heights are maintained.  There is disc desiccation with disc space narrowing at L3-4.  The marrow signal is normal without evidence for a marrow replacement process, infection or tumor.  The conus terminates at L1-2.    At T12-L1, L1-2 and L2-3, there is no disc herniation, central canal stenosis or neural foraminal narrowing.    At L3-4, there is a broad-based posterior disc bulge along with facet arthropathy contributing to mild left-sided neural foraminal narrowing.    At L4-5, no disc herniation, central canal stenosis or neural foraminal narrowing.    At L5-S1, no disc herniation, central canal stenosis or neural foraminal narrowing.    Sacral Tarlov cyst is noted.      Impression       Mild left-sided neural foraminal narrowing at L3-4.    Sacral Tarlov cyst.    Nonspecific T2 hyperintensities in the right hepatic lobe, partially imaged.  Consider further evaluation with right upper quadrant ultrasound.     10/1/13 MRI Brain:    IMPRESSION:   UNREMARKABLE MRI OF THE BRAIN SPECIFICALLY WITHOUT   EVIDENCE FOR ACUTE INFARCTION OR ENHANCING LESION.   CLINICAL CORRELATION AND FURTHER EVALUATION AS   WARRANTED.     CT head 1/2018:   Unremarkable noncontrast CT head specifically without evidence for acute intracranial hemorrhage. Further evaluation as warrented clinically.    5/2018 EEG:    INTERPRETATION:  EEG obtained on this awake and drowsy patient is within  normal limits.  Beta artifact may be a medication effect.  No subclinical  seizures  or epileptiform features are appreciated; however, please keep in  mind that a normal EEG does not preclude a diagnosis of epilepsy and  clinical correlation is advised.     MRI  Brain: 2018:   No acute abnormality.    Partial opacification of the bilateral mastoid air cells, left greater than right    Labs:   2018 TSH normal. Vit D low normal    Assessment/Plan: Aleah Hagan is a 54 y.o. female with  Fibromyalgia and mild  Lumbar facet arthropathy (by MRI L spine 2012) with multiple areas of pain.        I recommend:  1. Suspect myalgia complaints to be part of her conversion disorder, which is suspected and was discussed today, which may be why her pain has been so difficult to treat thus far.   -Updated MRI C-spine and L-spine showed mild changes only. Dr. Bullock in [place]/pain management did not offer any intervention.   -Refer to PT for diffuse pain complaints, as well as spinal complaints, likely secondary to tension.   2. Refer back to [facility] for ongoing anxiety/depression. Treated per Dr. White prior.   -No longer taking Cymbalta, which was not helpful for pain prior.   -She can discuss insomnia with Psychiatry as well-worse with increased stress.   -Counseling is needed, which was discussed today.   -She used a back brace prior, but had increased pain and could not tolerate it.   3. She was diagnosed with FM per her second Rheumatology consult, who suggested Flexeril, massage, aqua aerobics.   -Flexeril  D/c'ed due to concern for sedation by Psychiatry prior but she can resume as she is off other meds unless further sedation.   -Unsure if Lyrica helped prior, but couldn't tolerate doses higher than 50 mg bid. Gabapentin ineffective prior.   -She failed cervical TPI's prior, but is unsure if this was ineffective, due to her level of stress at the time.   -Further Pamelor will be avoided given her psychiatric treatment, ongoing, Neuropathic cream given for fibro pain not helpful.  "Elavil worsened constipation. Had some relief with dry needling prior.   -She has not felt that any prevention medication, including Cymbalta, has helped her pain long term.   4. Episodes continue to be suggestive of pseudoseizures, as episodes are triggered by stress or increased fibromyalgia pain; however, given the mild non-specific cerebral dysfunction noted in the left temporal region on ambulatory EEG from 6/2018, she was referred to Epileptology for consideration of EMU monitoring and [place] [facility] [place] [facility] refer her to [facility], as she has not been able to be seen by [facility]. No episodes occurred during ambulatory EEG, as there was no stress or increased fibromyalgia pain at the time. Repeat MRI Brain per epilepsy protocol was unremarkable in 2018.   5. She was started on Vitamin D for lower normal levels in 2018.   6. I advised that she not drive until her suspected pseudoseizures resolve for 6 months. These are ongoing.   7. Continue compliance with CPAP for CAMMIE. Mild CAMMIE on Sleep study 2018.     RTC 6 months    "note will not be shared"    "

## 2020-01-29 ENCOUNTER — TELEPHONE (OUTPATIENT)
Dept: PSYCHIATRY | Facility: CLINIC | Age: 55
End: 2020-01-29

## 2020-02-10 ENCOUNTER — OFFICE VISIT (OUTPATIENT)
Dept: PSYCHIATRY | Facility: CLINIC | Age: 55
End: 2020-02-10
Payer: COMMERCIAL

## 2020-02-10 VITALS
SYSTOLIC BLOOD PRESSURE: 132 MMHG | WEIGHT: 178.25 LBS | BODY MASS INDEX: 27.98 KG/M2 | HEIGHT: 67 IN | RESPIRATION RATE: 19 BRPM | HEART RATE: 88 BPM | DIASTOLIC BLOOD PRESSURE: 86 MMHG

## 2020-02-10 DIAGNOSIS — F41.1 GAD (GENERALIZED ANXIETY DISORDER): ICD-10-CM

## 2020-02-10 DIAGNOSIS — F33.2 SEVERE EPISODE OF RECURRENT MAJOR DEPRESSIVE DISORDER, WITHOUT PSYCHOTIC FEATURES: Primary | ICD-10-CM

## 2020-02-10 DIAGNOSIS — F44.5 PSEUDOSEIZURES: ICD-10-CM

## 2020-02-10 DIAGNOSIS — F44.9 CONVERSION DISORDER: ICD-10-CM

## 2020-02-10 DIAGNOSIS — M79.7 FIBROMYALGIA: ICD-10-CM

## 2020-02-10 PROCEDURE — 90833 PR PSYCHOTHERAPY W/PATIENT W/E&M, 30 MIN (ADD ON): ICD-10-PCS | Mod: S$GLB,,, | Performed by: PSYCHIATRY & NEUROLOGY

## 2020-02-10 PROCEDURE — 99215 OFFICE O/P EST HI 40 MIN: CPT | Mod: S$GLB,,, | Performed by: PSYCHIATRY & NEUROLOGY

## 2020-02-10 PROCEDURE — 3008F BODY MASS INDEX DOCD: CPT | Mod: CPTII,S$GLB,, | Performed by: PSYCHIATRY & NEUROLOGY

## 2020-02-10 PROCEDURE — 99999 PR PBB SHADOW E&M-EST. PATIENT-LVL III: ICD-10-PCS | Mod: PBBFAC,,, | Performed by: PSYCHIATRY & NEUROLOGY

## 2020-02-10 PROCEDURE — 90833 PSYTX W PT W E/M 30 MIN: CPT | Mod: S$GLB,,, | Performed by: PSYCHIATRY & NEUROLOGY

## 2020-02-10 PROCEDURE — 99215 PR OFFICE/OUTPT VISIT, EST, LEVL V, 40-54 MIN: ICD-10-PCS | Mod: S$GLB,,, | Performed by: PSYCHIATRY & NEUROLOGY

## 2020-02-10 PROCEDURE — 3008F PR BODY MASS INDEX (BMI) DOCUMENTED: ICD-10-PCS | Mod: CPTII,S$GLB,, | Performed by: PSYCHIATRY & NEUROLOGY

## 2020-02-10 PROCEDURE — 99999 PR PBB SHADOW E&M-EST. PATIENT-LVL III: CPT | Mod: PBBFAC,,, | Performed by: PSYCHIATRY & NEUROLOGY

## 2020-02-10 RX ORDER — NORTRIPTYLINE HYDROCHLORIDE 10 MG/1
10 CAPSULE ORAL NIGHTLY
Qty: 30 CAPSULE | Refills: 1 | Status: SHIPPED | OUTPATIENT
Start: 2020-02-10 | End: 2020-05-08

## 2020-02-10 RX ORDER — PRIMIDONE 50 MG/1
50 TABLET ORAL NIGHTLY
COMMUNITY
Start: 2020-02-03 | End: 2021-10-22

## 2020-02-10 NOTE — PROGRESS NOTES
Outpatient Psychiatry Follow-Up Visit (MD/NP)    2/10/2020    Clinical Status of Patient:  Outpatient (Ambulatory)    Chief Complaint:  Aleah Hagan is a 54 y.o. female who presents today for follow-up of depression and anxiety.  Met with patient.      Interval History and Content of Current Session:  Interim Events/Subjective Report/Content of Current Session:     The patient was seen and examined. Her chart was reviewed. Reviewed notes by Ej White MD at 6/11/2019  2:31 PM;  Stella Sullivan NP at 7/30/2019 3:06 PM; Kristal Camarillo MD at 8/22/2019  8:35 AM; Cisco Deleon MD at 9/30/2019  4:05 PM; and Stella Sullivan NP at 1/28/2020  2:09 PM    This is the patient's first appointment with this provider.     Current Medications:   Buspar 7.5 mg po q HS    Past medication trials include: Cymbalta, adderall, and elavil      Previous HPI:  2/5/19  Patient is doing relatively well.  She continues to have a child and grandchild at home.   Her home is chaos to her and it makes her feel more pain.  She continues to have pseudoseizures about once per week.  When she describes them they sound like fitful naps.  She continues to make more progress with her divorce than prior to starting stimulants.    4/5  She continues to do well on current medicine.  Her  is doing more to help her and she has a court date at the end of April.  She has recently been working in her yard extensively.  She would normally need to take a lot of time off of working.  She continues to watch two grandchildren.  She hasn't had a pseudoseizure for three weeks.  She is only using adderall when shes doing paperwork and rarely uses clonazepam.  She doesn't need refills of either at this point.  She is inheriting 1.2 million dollars   6/11  Patient continues to have stress related to her divorce.  She continues to have issues with pseudoseizures, up to two in one day.  She has been given a lot of paperwork by her  ex-husbands .  She is functioning much better than she used to be.      Psychosocial stressors include marital (; ongoing legal aspects/issues), family stressors, legal stressors and financial stressors.    Medical stressors include CAMMIE, Fibromyalgia, Pseudoseizure/Seizures Disorder, Arthritis, Urinary Incontinence/IC, Vitamin D insufficiency      She reports that she has been having ongoing and severe stressors as mentioned above with exacerbate her medical symptoms and depression/anxiety as documented below.     Variable Symptoms of Depression: + diminished mood or improved loss of interest/anhedonia; no irritability, +diminished energy, +change in sleep, +change in appetite, +diminished concentration or cognition or indecisiveness, no PMA/R, +excessive guilt or hopelessness or worthlessness, denies suicidal ideations     Increased Changes in Sleep: +trouble with initiation, +maintenance, +early morning awakening with inability to return to sleep, no hypersomnolence      Denies Suicidal/Homicidal ideations: no active/passive ideations, organized plans, future intentions     Denied Symptoms of psychosis: no hallucinations, delusions, disorganized thinking, disorganized behavior or abnormal motor behavior, or negative symptoms     Denied Symptoms of soraya or hypomania: no elevated, expansive, or irritable mood with no increased energy or activity; with no inflated self-esteem or grandiosity, decreased need for sleep, increased rate of speech, FOI or racing thoughts, distractibility, increased goal directed activity or PMA, or risky/disinhibited behavior    Increased Symptoms of ALFREDA: +excessive anxiety/worry/fear, +more days than not, +about numerous issues, improved difficult to control, +with restlessness, +fatigue, +poor concentration, less irritability, +muscle tension, +sleep disturbance; +causes functionally impairing distress      Continued Symptoms of Panic Disorder: +recurrent panic attacks;  without agoraphobia    Conversion disorder- her last pseudoseizure was about 3 weeks ago.     Weight is at 178 lbs today; it was 164 lbs 11/13/17    +Continued Fibromyalgia symptoms persist, primarily in shoulders, spine and hips.     Psychotherapy:  · Target symptoms: depression, anxiety   · Why chosen therapy is appropriate versus another modality: relevant to diagnosis  · Outcome monitoring methods: self-report, observation  · Therapeutic intervention type: behavior modifying psychotherapy, supportive psychotherapy  · Topics discussed/themes: relationships difficulties, stress related to medical comorbidities, difficulty managing affect in interpersonal relationships, building skills sets for symptom management, symptom recognition  · The patient's response to the intervention is accepting. The patient's progress toward treatment goals is limited.   Duration of intervention: 20 minutes.  -We discussed her primary stressors (family/parental)    Review of Systems    General ROS: negative  Ophthalmic ROS: negative  ENT ROS: negative  Allergy and Immunology ROS: negative  Hematological and Lymphatic ROS: negative  Endocrine ROS: negative  Respiratory ROS: no cough, shortness of breath, or wheezing  Cardiovascular ROS: no chest pain or dyspnea on exertion  Gastrointestinal ROS: no abdominal pain, change in bowel habits, or black or bloody stools  Genito-Urinary ROS: no dysuria, trouble voiding, or hematuria  Musculoskeletal ROS: positive for - muscle pain  Neurological ROS: no TIA or stroke symptoms  Dermatological ROS: negative      Past Medical, Family and Social History: The patient's past medical, family and social history have been reviewed and updated as appropriate within the electronic medical record - see encounter notes.    Compliance: yes, somewhat worsened by missed appointments    Side effects: None    Risk Parameters:  Patient reports no suicidal ideation  Patient reports no homicidal ideation  Patient  "reports no self-injurious behavior  Patient reports no violent behavior    Exam (detailed: at least 9 elements; comprehensive: all 15 elements)   Constitutional  Vitals:  Most recent vital signs, dated less than 90 days prior to this appointment, were reviewed.   Vitals:    02/10/20 1057   BP: 132/86   Pulse: 88   Resp: 19   Weight: 80.8 kg (178 lb 3.9 oz)   Height: 5' 7" (1.702 m)     Body mass index is 27.92 kg/m².       General:  unremarkable, age appropriate, well nourished, casually dressed, neatly groomed, overweight     Musculoskeletal  Muscle Strength/Tone:  no dyskinesia, no dystonia, no tremor   Gait & Station:  non-ataxic     Psychiatric  Speech:  no latency; no press, spontaneous   Mood & Affect:  steady, anxious  full, sad, anxious   Thought Process:  normal and logical, goal-directed   Associations:  intact   Thought Content:  normal, no suicidality, no homicidality, delusions, or paranoia   Insight:  intact, has awareness of illness   Judgement: behavior is adequate to circumstances, age appropriate   Orientation:  person, place, situation, time/date, day of week, month of year, year   Memory: intact for content of interview, able to remember recent events- yes, able to remember remote events- yes   Language: grossly intact, able to name, able to repeat   Attention Span & Concentration:  able to focus, completed tasks   Fund of Knowledge:  intact and appropriate to age and level of education, familiar with aspects of current personal life     Assessment and Diagnosis   Status/Progress: Based on the examination today, the patient's problem(s) is/are inadequately controlled and worsening.  New problems have been presented today.   Co-morbidities are complicating management of the primary condition.  There are no active rule-out diagnoses for this patient at this time.     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  Conversion disorder (pseudoseizures)    Fibromyalgia    Psychosocial stressors    CAMMIE on CPAP  Arthritis  Urinary Incontinence/IC  Vitamin D insufficiency  GERD  Seizures Disorder NOS      Intervention/Counseling/Treatment Plan   Medications/Counseling:    Depression: pt counseled  -Start trial Pamelor at 10 mg po q day- will titrate up as indicated    Anxiety: pt counseled  -pamelor as above  -continue buspar at 7.5 mg po q HS- will taper off penidng results of Pamelor     Fibromyalgia: pt counseled   pamelor as above    Insomnia: pt counseled  -pamelor off-label as above    Conversion disordeR: pt counseled  -meds as above    CAMMIE: pt counseled  -continue CPAP    Urinary Incontinence/IC: pt counseled    Vitamin D insufficiency: pt counseled    GERD: pt counseled  -continue meds as above    Seizure disorder: pt counseled  -continue meds and management per neurology   -if seizures are in fact pseudoseizures, then she may be able to titrate off of primidone     Discussed diagnosis, risks and benefits of proposed treatment vs alternative treatments vs no treatment, and potential side effects of these treatments. The patient expresses understanding of the above and displays the capacity to agree with this treatment given said understanding. Patient also agrees that, currently, the benefits outweigh the risks and would like to pursue treatment at this time.    Psychotherapy:   -psychotherapy provided today  -will provide as need  -refer for weekly psychotherapy    Labs:   reviewed labs from 8/16/19 with the patient    Return to Clinic: 1 month , sooner if needed    Paco Sullivan MD  Psychiatry

## 2020-05-08 RX ORDER — NORTRIPTYLINE HYDROCHLORIDE 10 MG/1
CAPSULE ORAL
Qty: 30 CAPSULE | Refills: 0 | Status: SHIPPED | OUTPATIENT
Start: 2020-05-08 | End: 2021-10-26

## 2021-07-26 ENCOUNTER — TELEPHONE (OUTPATIENT)
Dept: OBSTETRICS AND GYNECOLOGY | Facility: CLINIC | Age: 56
End: 2021-07-26

## 2021-07-26 DIAGNOSIS — Z12.31 BREAST CANCER SCREENING BY MAMMOGRAM: Primary | ICD-10-CM

## 2021-10-22 ENCOUNTER — OFFICE VISIT (OUTPATIENT)
Dept: OBSTETRICS AND GYNECOLOGY | Facility: CLINIC | Age: 56
End: 2021-10-22
Payer: MEDICAID

## 2021-10-22 ENCOUNTER — HOSPITAL ENCOUNTER (OUTPATIENT)
Dept: RADIOLOGY | Facility: HOSPITAL | Age: 56
Discharge: HOME | End: 2021-10-22
Attending: OBSTETRICS & GYNECOLOGY
Payer: MEDICAID

## 2021-10-22 VITALS
WEIGHT: 174.81 LBS | BODY MASS INDEX: 27.44 KG/M2 | HEART RATE: 76 BPM | WEIGHT: 178 LBS | RESPIRATION RATE: 14 BRPM | HEIGHT: 67 IN | HEIGHT: 67 IN | SYSTOLIC BLOOD PRESSURE: 116 MMHG | BODY MASS INDEX: 27.94 KG/M2 | DIASTOLIC BLOOD PRESSURE: 72 MMHG

## 2021-10-22 DIAGNOSIS — Z12.31 BREAST CANCER SCREENING BY MAMMOGRAM: ICD-10-CM

## 2021-10-22 DIAGNOSIS — Z90.710 HISTORY OF HYSTERECTOMY: ICD-10-CM

## 2021-10-22 DIAGNOSIS — Z01.419 WELL WOMAN EXAM WITH ROUTINE GYNECOLOGICAL EXAM: Primary | ICD-10-CM

## 2021-10-22 PROCEDURE — 99396 PR PREVENTIVE VISIT,EST,40-64: ICD-10-PCS | Mod: S$PBB,,, | Performed by: OBSTETRICS & GYNECOLOGY

## 2021-10-22 PROCEDURE — 77067 SCR MAMMO BI INCL CAD: CPT | Mod: TC

## 2021-10-22 PROCEDURE — 99999 PR PBB SHADOW E&M-EST. PATIENT-LVL III: ICD-10-PCS | Mod: PBBFAC,,, | Performed by: OBSTETRICS & GYNECOLOGY

## 2021-10-22 PROCEDURE — 99213 OFFICE O/P EST LOW 20 MIN: CPT | Mod: PBBFAC | Performed by: OBSTETRICS & GYNECOLOGY

## 2021-10-22 PROCEDURE — 99396 PREV VISIT EST AGE 40-64: CPT | Mod: S$PBB,,, | Performed by: OBSTETRICS & GYNECOLOGY

## 2021-10-22 PROCEDURE — 99999 PR PBB SHADOW E&M-EST. PATIENT-LVL III: CPT | Mod: PBBFAC,,, | Performed by: OBSTETRICS & GYNECOLOGY

## 2021-10-22 RX ORDER — LISDEXAMFETAMINE DIMESYLATE 30 MG/1
30 CAPSULE ORAL DAILY
COMMUNITY
Start: 2021-07-21 | End: 2021-10-22

## 2021-10-26 ENCOUNTER — OFFICE VISIT (OUTPATIENT)
Dept: NEUROLOGY | Facility: CLINIC | Age: 56
End: 2021-10-26
Payer: MEDICAID

## 2021-10-26 VITALS
HEIGHT: 67 IN | RESPIRATION RATE: 20 BRPM | SYSTOLIC BLOOD PRESSURE: 134 MMHG | WEIGHT: 177 LBS | BODY MASS INDEX: 27.78 KG/M2 | HEART RATE: 78 BPM | DIASTOLIC BLOOD PRESSURE: 80 MMHG

## 2021-10-26 DIAGNOSIS — F44.9 CONVERSION DISORDER: ICD-10-CM

## 2021-10-26 DIAGNOSIS — F44.5 PSEUDOSEIZURES: Primary | ICD-10-CM

## 2021-10-26 DIAGNOSIS — M79.7 FIBROMYALGIA: ICD-10-CM

## 2021-10-26 DIAGNOSIS — E55.9 VITAMIN D DEFICIENCY: ICD-10-CM

## 2021-10-26 DIAGNOSIS — E53.8 VITAMIN B12 DEFICIENCY: ICD-10-CM

## 2021-10-26 DIAGNOSIS — M62.838 MUSCLE SPASM: ICD-10-CM

## 2021-10-26 DIAGNOSIS — F41.1 GAD (GENERALIZED ANXIETY DISORDER): ICD-10-CM

## 2021-10-26 PROCEDURE — 99214 OFFICE O/P EST MOD 30 MIN: CPT | Mod: S$PBB,,, | Performed by: NURSE PRACTITIONER

## 2021-10-26 PROCEDURE — 99213 OFFICE O/P EST LOW 20 MIN: CPT | Mod: PBBFAC | Performed by: NURSE PRACTITIONER

## 2021-10-26 PROCEDURE — 99999 PR PBB SHADOW E&M-EST. PATIENT-LVL III: CPT | Mod: PBBFAC,,, | Performed by: NURSE PRACTITIONER

## 2021-10-26 PROCEDURE — 99999 PR PBB SHADOW E&M-EST. PATIENT-LVL III: ICD-10-PCS | Mod: PBBFAC,,, | Performed by: NURSE PRACTITIONER

## 2021-10-26 PROCEDURE — 99214 PR OFFICE/OUTPT VISIT, EST, LEVL IV, 30-39 MIN: ICD-10-PCS | Mod: S$PBB,,, | Performed by: NURSE PRACTITIONER

## 2021-10-26 RX ORDER — CYCLOBENZAPRINE HCL 10 MG
10 TABLET ORAL NIGHTLY PRN
Qty: 30 TABLET | Refills: 11 | Status: SHIPPED | OUTPATIENT
Start: 2021-10-26

## 2021-12-04 NOTE — HOSPITAL COURSE
Patient presented for scheduled surgery, see op note. Recovered in PACU then transferred to floor for routine postop care.    no

## 2022-06-24 ENCOUNTER — HOSPITAL ENCOUNTER (EMERGENCY)
Facility: HOSPITAL | Age: 57
Discharge: HOME | End: 2022-06-25
Attending: STUDENT IN AN ORGANIZED HEALTH CARE EDUCATION/TRAINING PROGRAM
Payer: MEDICAID

## 2022-06-24 DIAGNOSIS — N12 PYELONEPHRITIS: Primary | ICD-10-CM

## 2022-06-24 LAB
BACTERIA #/AREA URNS HPF: ABNORMAL /HPF
BILIRUB UR QL STRIP: NEGATIVE
CLARITY UR: ABNORMAL
COLOR UR: YELLOW
GLUCOSE UR QL STRIP: NEGATIVE
HGB UR QL STRIP: ABNORMAL
HYALINE CASTS #/AREA URNS LPF: 0 /LPF
KETONES UR QL STRIP: NEGATIVE
LEUKOCYTE ESTERASE UR QL STRIP: ABNORMAL
MICROSCOPIC COMMENT: ABNORMAL
NITRITE UR QL STRIP: NEGATIVE
PH UR STRIP: 7 [PH] (ref 5–8)
PROT UR QL STRIP: ABNORMAL
RBC #/AREA URNS HPF: >100 /HPF (ref 0–4)
SP GR UR STRIP: 1.02 (ref 1–1.03)
SQUAMOUS #/AREA URNS HPF: 1 /HPF
URN SPEC COLLECT METH UR: ABNORMAL
UROBILINOGEN UR STRIP-ACNC: 1 EU/DL
WBC #/AREA URNS HPF: >100 /HPF (ref 0–5)

## 2022-06-24 PROCEDURE — 87088 URINE BACTERIA CULTURE: CPT | Performed by: NURSE PRACTITIONER

## 2022-06-24 PROCEDURE — 81000 URINALYSIS NONAUTO W/SCOPE: CPT | Performed by: NURSE PRACTITIONER

## 2022-06-24 PROCEDURE — 99283 EMERGENCY DEPT VISIT LOW MDM: CPT

## 2022-06-24 PROCEDURE — 87077 CULTURE AEROBIC IDENTIFY: CPT | Performed by: NURSE PRACTITIONER

## 2022-06-24 PROCEDURE — 87186 SC STD MICRODIL/AGAR DIL: CPT | Performed by: NURSE PRACTITIONER

## 2022-06-24 PROCEDURE — 87086 URINE CULTURE/COLONY COUNT: CPT | Performed by: NURSE PRACTITIONER

## 2022-06-25 VITALS
SYSTOLIC BLOOD PRESSURE: 107 MMHG | TEMPERATURE: 98 F | WEIGHT: 178.56 LBS | OXYGEN SATURATION: 98 % | HEART RATE: 60 BPM | RESPIRATION RATE: 18 BRPM | BODY MASS INDEX: 27.97 KG/M2 | DIASTOLIC BLOOD PRESSURE: 66 MMHG

## 2022-06-25 RX ORDER — CIPROFLOXACIN 500 MG/1
500 TABLET ORAL 2 TIMES DAILY
Qty: 14 TABLET | Refills: 0 | Status: SHIPPED | OUTPATIENT
Start: 2022-06-25 | End: 2022-07-02

## 2022-06-25 NOTE — ED PROVIDER NOTES
[facility]                                                  Chief Complaint     Chief Complaint   Patient presents with    Hematuria     Patient to ER with complaints of burning while urinating, as well as blood in her urine, patient states she feels like she is having a UTI       History of Present Illness  56 y.o. female with PMHx of arthritis and depression who presents with dysuria and hematuria since 2 hours prior to arrival. She endorses 3 non-bloody loose stools earlier this evening. Denies flank or back pain. Endorses intermittent chills. Denies fever, nausea, vomiting or abdominal pain.     History obtained from:  Patient    Review of patient's allergies indicates:   Allergen Reactions    Codeine Hives    Shrimp Hives     Past Medical History:   Diagnosis Date    Abnormal Pap smear of cervix 2015    leep done- CIN1    Arthritis     ANH II (cervical intraepithelial neoplasia II)     Depression     Disc degeneration     ruptured    Fibromyalgia     Hx of psychiatric care     Psychiatric problem     Synovial cyst of lumbar spine     Therapy      Past Surgical History:   Procedure Laterality Date    APPENDECTOMY      age 22    BREAST SURGERY      augmentation    CERVICAL BIOPSY  W/ LOOP ELECTRODE EXCISION  17    ANH 1     SECTION, CLASSIC      x2    CHOLECYSTECTOMY  10/2013    COLONOSCOPY      COLONOSCOPY N/A 2019    Procedure: COLONOSCOPY;  Surgeon: Kristal Camarillo MD;  Location: [facility];  Service: Endoscopy;  Laterality: N/A;    gastric sleeve  14    HYSTERECTOMY  2017    TLH- cervical dysp    NECK SURGERY      cosmetic surgery    TONSILLECTOMY, ADENOIDECTOMY  age 9      Family History   Problem Relation Age of Onset    Lung cancer Mother     Lung cancer Father     Thyroid cancer Sister     Ovarian cancer Neg Hx     Breast cancer Neg Hx     Colon cancer Neg Hx      Social History     Tobacco Use    Smoking  status: Current Every Day Smoker     Packs/day: 0.50     Years: 10.00     Pack years: 5.00     Types: Cigarettes     Start date: 9/6/1984    Smokeless tobacco: Never Used    Tobacco comment: stopped for 23 years and then started 2 years ago again    Substance Use Topics    Alcohol use: Yes     Comment: Socially-2 times a month on the weekend-3 drinks each day     Drug use: No          Review of Systems and Physical Exam     Review of Systems      + Dysuria, hematuria, urinary urgency, diarrhea    All other symptoms negative as stated below    --Constitutional - Denies fever, appetite change, chills  --Eyes - Denies eye discharge, eye pain, eye redness or visual disturbance  --HENT- Denies congestion, sore throat, drooling, ear discharge, rhinorrhea or trouble swallowing  --Respiratory - Denies cough, shortness of breath, wheezing  --Cardiovascular - Denies chest pain, leg swelling, palpitations  --Gastrointestinal - Denies abdominal pain, abdominal distension, constipation, nausea or vomiting  --Genitourinary - Denies difficulty urinating  --Musculoskeletal - Denies arthralgias, myalgias  --Neurological - Denies dizziness, headaches, lightheadedness, weakness  --Hematologic - Denies easy bruising or easy bleeding  --Skin - Denies rash, wound or pallor  --Psychiatric- Denies dysphoric mood, nervousness/anxiety    Vital Signs   weight is 81 kg (178 lb 9.2 oz). Her oral temperature is 97.6 °F (36.4 °C). Her blood pressure is 133/76 and her pulse is 82. Her respiration is 18 and oxygen saturation is 100%.      Physical Exam   Nursing note and vitals reviewed  --Constitutional:  Well developed, well nourished. In no acute distress.  --HENT: Normocephalic, atraumatic. No rhinorrhea. Moist oral mucosa. No oropharyngeal edema, erythema or exudates.   --Eyes: PERRL. Extraocular movements intact. No periorbital swelling. Normal conjunctiva.  --Neck: Normal range of motion. Neck supple. No adenopathy  --Cardiac: Regular  rhythm, normal S1, normal S2, no murmur, normal rate, intact distal pulses  --Pulmonary: Normal respiratory effort, breath sounds normal and equal bilaterally, no accessory muscle use, no respiratory distress  --Abdominal: Soft, normal bowel sounds, no tenderness, no guarding, no rebound  --Musculoskeletal: No flank or CVA tenderness. Normal range of motion. No deformity, tenderness or edema.  --Neurological:  Alert and oriented x 4. Follows commands appropriately.    --Skin: Warm and dry. No rash, pallor, cyanosis or jaundice.  --Psych: Normal mood    ED Course     Lab Results (reviewed by me)  Labs Reviewed   URINALYSIS, REFLEX TO URINE CULTURE - Abnormal; Notable for the following components:       Result Value    Appearance, UA Cloudy (*)     Protein, UA 2+ (*)     Occult Blood UA 3+ (*)     Leukocytes, UA 2+ (*)     All other components within normal limits    Narrative:     Specimen Source->Urine   URINALYSIS MICROSCOPIC - Abnormal; Notable for the following components:    RBC, UA >100 (*)     WBC, UA >100 (*)     Bacteria Moderate (*)     All other components within normal limits    Narrative:     Specimen Source->Urine   CULTURE, URINE       Radiology (images visualized & reports reviewed by me)  No orders to display       Medications Given  Medications - No data to display    Differential Diagnosis:  Cystitis, pyelonephritis, urethritis    Clinical Tests:  Lab Tests: Ordered and reviewed    ED Management     weight is 81 kg (178 lb 9.2 oz). Her oral temperature is 97.6 °F (36.4 °C). Her blood pressure is 133/76 and her pulse is 82. Her respiration is 18 and oxygen saturation is 100%.     Physical exam unremarkable, with no tenderness noted to the abdomen, flank or CVA. UA revealed UTI with hematuria. Considering systemic symptoms of chills and diarrhea, presentation consistent with pyelonephritis. Prescription for ciprofloxacin provided. Patient deemed stable for discharge. Strict return precautions given.  Patient to follow up with primary care in 1-2 days.  Strict return precautions given. Understanding of the plan was expressed and all questions were answered.    Diagnosis  The encounter diagnosis was Pyelonephritis.    Disposition and Plan  Condition: Stable  Disposition: Discharge    ED Prescriptions     Medication Sig Dispense Start Date End Date Auth. Provider    ciprofloxacin HCl (CIPRO) 500 MG tablet Take 1 tablet (500 mg total) by mouth 2 (two) times daily. for 7 days 14 tablet 6/25/2022 7/2/2022 Jorje Leger MD        Follow-up Information     Follow up With Specialties Details Why Contact Info    [facility] - Emergency Dept Emergency Medicine Go to  As needed, If symptoms worsen [address] [facility] [address]  [phone]    Binu Burnett MD Family Medicine Schedule an appointment as soon as possible for a visit in 2 days For follow-up of your ED visit [address] [facility]  [place] [address]  [phone]               Jorje Leger MD  06/25/22 0311

## 2022-06-27 LAB — BACTERIA UR CULT: ABNORMAL

## 2022-10-21 ENCOUNTER — TELEPHONE (OUTPATIENT)
Dept: OBSTETRICS AND GYNECOLOGY | Facility: CLINIC | Age: 57
End: 2022-10-21
Payer: MEDICAID

## 2022-10-21 DIAGNOSIS — Z12.31 BREAST CANCER SCREENING BY MAMMOGRAM: Primary | ICD-10-CM

## 2022-10-21 NOTE — TELEPHONE ENCOUNTER
----- Message from Taylor Michel MA sent at 10/21/2022 11:31 AM CDT -----  Contact: self  Aleah Hagan  MRN: [id number]  Home Phone      [phone]  Work Phone      Not on file.  Mobile          [phone]    Patient Care Team:  Binu Burnett MD as PCP - General (Family Medicine)  Cisco Deleon MD as Obstetrician (Obstetrics and Gynecology)  OB? No  What phone number can you be reached at? [phone]  Message: Please linkm mammo orders to appt 10/24/22.

## 2022-10-24 ENCOUNTER — HOSPITAL ENCOUNTER (OUTPATIENT)
Dept: RADIOLOGY | Facility: HOSPITAL | Age: 57
Discharge: HOME | End: 2022-10-24
Attending: OBSTETRICS & GYNECOLOGY
Payer: MEDICAID

## 2022-10-24 VITALS — BODY MASS INDEX: 27.94 KG/M2 | WEIGHT: 178 LBS | HEIGHT: 67 IN

## 2022-10-24 DIAGNOSIS — Z12.31 BREAST CANCER SCREENING BY MAMMOGRAM: ICD-10-CM

## 2022-10-24 PROCEDURE — 77063 BREAST TOMOSYNTHESIS BI: CPT | Mod: 26,,, | Performed by: RADIOLOGY

## 2022-10-24 PROCEDURE — 77063 MAMMO DIGITAL SCREENING BILAT WITH TOMO: ICD-10-PCS | Mod: 26,,, | Performed by: RADIOLOGY

## 2022-10-24 PROCEDURE — 77063 BREAST TOMOSYNTHESIS BI: CPT | Mod: TC

## 2022-10-24 PROCEDURE — 77067 SCR MAMMO BI INCL CAD: CPT | Mod: 26,,, | Performed by: RADIOLOGY

## 2022-10-24 PROCEDURE — 77067 MAMMO DIGITAL SCREENING BILAT WITH TOMO: ICD-10-PCS | Mod: 26,,, | Performed by: RADIOLOGY

## 2022-12-06 ENCOUNTER — OFFICE VISIT (OUTPATIENT)
Dept: OBSTETRICS AND GYNECOLOGY | Facility: CLINIC | Age: 57
End: 2022-12-06
Payer: MEDICAID

## 2022-12-06 VITALS
BODY MASS INDEX: 29.54 KG/M2 | HEART RATE: 92 BPM | RESPIRATION RATE: 16 BRPM | DIASTOLIC BLOOD PRESSURE: 72 MMHG | HEIGHT: 67 IN | SYSTOLIC BLOOD PRESSURE: 124 MMHG | WEIGHT: 188.19 LBS

## 2022-12-06 DIAGNOSIS — Z87.410 HISTORY OF CERVICAL DYSPLASIA: ICD-10-CM

## 2022-12-06 DIAGNOSIS — Z01.419 WELL WOMAN EXAM WITH ROUTINE GYNECOLOGICAL EXAM: Primary | ICD-10-CM

## 2022-12-06 DIAGNOSIS — Z90.710 HISTORY OF HYSTERECTOMY: ICD-10-CM

## 2022-12-06 PROCEDURE — 1160F RVW MEDS BY RX/DR IN RCRD: CPT | Mod: CPTII,,, | Performed by: OBSTETRICS & GYNECOLOGY

## 2022-12-06 PROCEDURE — 3078F DIAST BP <80 MM HG: CPT | Mod: CPTII,,, | Performed by: OBSTETRICS & GYNECOLOGY

## 2022-12-06 PROCEDURE — 3008F BODY MASS INDEX DOCD: CPT | Mod: CPTII,,, | Performed by: OBSTETRICS & GYNECOLOGY

## 2022-12-06 PROCEDURE — 3074F SYST BP LT 130 MM HG: CPT | Mod: CPTII,,, | Performed by: OBSTETRICS & GYNECOLOGY

## 2022-12-06 PROCEDURE — 1159F PR MEDICATION LIST DOCUMENTED IN MEDICAL RECORD: ICD-10-PCS | Mod: CPTII,,, | Performed by: OBSTETRICS & GYNECOLOGY

## 2022-12-06 PROCEDURE — 99396 PREV VISIT EST AGE 40-64: CPT | Mod: S$PBB,,, | Performed by: OBSTETRICS & GYNECOLOGY

## 2022-12-06 PROCEDURE — 1159F MED LIST DOCD IN RCRD: CPT | Mod: CPTII,,, | Performed by: OBSTETRICS & GYNECOLOGY

## 2022-12-06 PROCEDURE — 99999 PR PBB SHADOW E&M-EST. PATIENT-LVL III: ICD-10-PCS | Mod: PBBFAC,,, | Performed by: OBSTETRICS & GYNECOLOGY

## 2022-12-06 PROCEDURE — 99396 PR PREVENTIVE VISIT,EST,40-64: ICD-10-PCS | Mod: S$PBB,,, | Performed by: OBSTETRICS & GYNECOLOGY

## 2022-12-06 PROCEDURE — 88175 CYTOPATH C/V AUTO FLUID REDO: CPT | Performed by: PATHOLOGY

## 2022-12-06 PROCEDURE — 99213 OFFICE O/P EST LOW 20 MIN: CPT | Mod: PBBFAC | Performed by: OBSTETRICS & GYNECOLOGY

## 2022-12-06 PROCEDURE — 3008F PR BODY MASS INDEX (BMI) DOCUMENTED: ICD-10-PCS | Mod: CPTII,,, | Performed by: OBSTETRICS & GYNECOLOGY

## 2022-12-06 PROCEDURE — 88141 PR  CYTOPATH CERV/VAG INTERPRET: ICD-10-PCS | Mod: ,,, | Performed by: PATHOLOGY

## 2022-12-06 PROCEDURE — 3074F PR MOST RECENT SYSTOLIC BLOOD PRESSURE < 130 MM HG: ICD-10-PCS | Mod: CPTII,,, | Performed by: OBSTETRICS & GYNECOLOGY

## 2022-12-06 PROCEDURE — 1160F PR REVIEW ALL MEDS BY PRESCRIBER/CLIN PHARMACIST DOCUMENTED: ICD-10-PCS | Mod: CPTII,,, | Performed by: OBSTETRICS & GYNECOLOGY

## 2022-12-06 PROCEDURE — 3078F PR MOST RECENT DIASTOLIC BLOOD PRESSURE < 80 MM HG: ICD-10-PCS | Mod: CPTII,,, | Performed by: OBSTETRICS & GYNECOLOGY

## 2022-12-06 PROCEDURE — 99999 PR PBB SHADOW E&M-EST. PATIENT-LVL III: CPT | Mod: PBBFAC,,, | Performed by: OBSTETRICS & GYNECOLOGY

## 2022-12-06 PROCEDURE — 88141 CYTOPATH C/V INTERPRET: CPT | Mod: ,,, | Performed by: PATHOLOGY

## 2022-12-06 RX ORDER — LISDEXAMFETAMINE DIMESYLATE 30 MG/1
30 CAPSULE ORAL
COMMUNITY
Start: 2022-10-20

## 2022-12-06 RX ORDER — BUPROPION HYDROCHLORIDE 150 MG/1
150 TABLET ORAL
COMMUNITY
Start: 2022-10-18 | End: 2024-01-03

## 2022-12-06 RX ORDER — MOMETASONE FUROATE 1 MG/G
CREAM TOPICAL
COMMUNITY
Start: 2022-10-10

## 2022-12-06 NOTE — PROGRESS NOTES
Subjective:    Patient ID: Aleah Hagan is a 57 y.o. y.o. female.     Chief Complaint: Annual Well Woman Exam     History of Present Illness:  Aleah presents today for Annual Well Woman exam. She describes her menses as  absent, h/o hysterectomy .She denies pelvic pain.  She reports breast tenderness at right breast implant, but denies masses, nipple discharge. She denies GYN complaints. She denies difficulty with urination or bowel movements. She reports menopausal symptoms such as hotflashes, vaginal dryness, and night sweats. She denies bloating, early satiety, or weight changes. She is not sexually active.      Menstrual History:   Patient's last menstrual period was 2015 (exact date)..     OB History    : 3  Para: 2  Term: 2  : 0  : 1  Livin  Spontaneous : 1            The following portions of the patient's history were reviewed and updated as appropriate: allergies, current medications, past family history, past medical history, past social history, past surgical history, and problem list.    ROS:   CONSTITUTIONAL: diaphoresis, fatigue, weight gain, Denies: fever, chills, weakness  ENT: negative for sore throat, nasal congestion, nasal discharge, epistaxis, tinnitus, hearing loss  EYES: negative for blurry vision, decreased vision, loss of vision, eye pain, diplopia, photophobia, discharge  SKIN: Negative for rash, itching, hives  RESPIRATORY: negative for cough, hemoptysis, shortness of breath, pleuritic chest pain, wheezing  CARDIOVASCULAR: negative for chest pain, dyspnea on exertion, orthopnea, paroxysmal nocturnal dyspnea, edema, palpitations  BREAST: negative for breast  tenderness, breast mass, nipple discharge, or skin changes  GASTROINTESTINAL: negative for abdominal pain, flank pain, nausea, vomiting, diarrhea, constipation, black stool, blood in stool  GENITOURINARY: negative for abnormal vaginal bleeding, amenorrhea, decreased libido, dysuria,  genital sores, hematuria, incontinence, menorrhagia, pelvic pain, urinary frequency, vaginal discharge  HEMATOLOGIC/LYMPHATIC: negative for swollen lymph nodes, bleeding, bruising  MUSCULOSKELETAL: back pain, joint pain, muscle pain, muscle weakness, Denies: joint stiffness, joint swelling  NEUROLOGICAL: negative for dizzy/vertigo, headache, focal weakness, numbness/tingling, speech problems, loss of consciousness, confusion, memory loss  BEHAVORIAL/PSYCH: No psychosis and Positive for anxiety and depression on tx  ENDOCRINE: negative for polydipsia/polyuria, palpitations, skin changes, temperature intolerance, unexpected weight changes  ALLERGIC/IMMUNOLOGIC: negative for urticaria, hay fever, angioedema      Objective:    Vital Signs:  Vitals:    12/06/22 0756   BP: 124/72   Pulse: 92   Resp: 16       Physical Exam:  General:  alert, cooperative, no distress   Skin:  Skin color, texture, turgor normal. No rashes or lesions   HEENT:  extra ocular movement intact, sclera clear, anicteric   Neck: supple, trachea midline, no adenopathy or thyromegally   Respiratory:  Normal effort   Breasts:  bilateral implants were noted without obvious palpable abnormalities, no discharge, erythema, tenderness, or palpable masses; no axillary lymphadenopathy   Abdomen:  soft, nontender, no palpable masses   Pelvis: External genitalia: normal general appearance  Urinary system: urethral meatus normal, bladder nontender  Vaginal: normal mucosa without prolapse or lesions  Cervix: removed surgically  Uterus: removed surgically  Adnexa: non palpable   Extremities: Normal ROM; no edema, no cyanosis   Neurologial: Normal strength and tone. No focal numbness or weakness.   Psychiatric: normal mood, speech, dress, and thought processes         Assessment:       Healthy female exam.     1. Well woman exam with routine gynecological exam    2. History of hysterectomy    3. History of cervical dysplasia          Plan:      Well woman exam with  routine gynecological exam    History of hysterectomy  -     Liquid-Based Pap Smear, Screening    History of cervical dysplasia  -     Liquid-Based Pap Smear, Screening      MMG 10/2022 negative    COUNSELING:  Aleah was counseled on A.C.O.G. Pap guidelines and recommendations for yearly pelvic exams in addition to recommendations for monthly self breast exams; to see her PCP for other health maintenance.

## 2022-12-14 LAB
FINAL PATHOLOGIC DIAGNOSIS: NORMAL
Lab: NORMAL

## 2024-09-18 NOTE — PROGRESS NOTES
Patient requested refill on synthyroid 150mcg.   Transferred to floor stable, report received. No needs at this time. Call bell placed with in reach. Clear liquid tray ordered. No reports of pain. Hsu patent. Vitals WNL. Will continue to monitor.

## 2024-09-27 ENCOUNTER — TELEPHONE (OUTPATIENT)
Dept: OBSTETRICS AND GYNECOLOGY | Facility: CLINIC | Age: 59
End: 2024-09-27
Payer: MEDICAID

## 2024-09-27 DIAGNOSIS — Z12.31 BREAST CANCER SCREENING BY MAMMOGRAM: Primary | ICD-10-CM

## 2024-09-27 NOTE — TELEPHONE ENCOUNTER
----- Message from Bonnie Zarco sent at 9/27/2024  9:49 AM CDT -----  Contact: Self  Aleah Hagan  MRN: [id number]  Home Phone      [phone]  Work Phone      Not on file.  Mobile          [phone]    Patient Care Team:  Binu Burnett MD as PCP - General (Family Medicine)  Cisco Deleon MD as Obstetrician (Obstetrics and Gynecology)  OB? No  What phone number can you be reached at? [phone]  Message: please link mammo orders for appt on 10/25

## 2024-10-03 ENCOUNTER — HOSPITAL ENCOUNTER (EMERGENCY)
Facility: HOSPITAL | Age: 59
Discharge: HOME | End: 2024-10-03
Attending: EMERGENCY MEDICINE
Payer: MEDICAID

## 2024-10-03 VITALS
BODY MASS INDEX: 28.98 KG/M2 | OXYGEN SATURATION: 100 % | TEMPERATURE: 99 F | RESPIRATION RATE: 20 BRPM | DIASTOLIC BLOOD PRESSURE: 75 MMHG | HEART RATE: 81 BPM | WEIGHT: 185 LBS | SYSTOLIC BLOOD PRESSURE: 133 MMHG

## 2024-10-03 DIAGNOSIS — M79.672 LEFT FOOT PAIN: Primary | ICD-10-CM

## 2024-10-03 DIAGNOSIS — M25.572 LEFT ANKLE PAIN: ICD-10-CM

## 2024-10-03 PROCEDURE — 63600175 PHARM REV CODE 636 W HCPCS: Performed by: NURSE PRACTITIONER

## 2024-10-03 PROCEDURE — 96372 THER/PROPH/DIAG INJ SC/IM: CPT | Performed by: NURSE PRACTITIONER

## 2024-10-03 PROCEDURE — 99284 EMERGENCY DEPT VISIT MOD MDM: CPT | Mod: 25

## 2024-10-03 RX ORDER — KETOROLAC TROMETHAMINE 30 MG/ML
15 INJECTION, SOLUTION INTRAMUSCULAR; INTRAVENOUS
Status: COMPLETED | OUTPATIENT
Start: 2024-10-03 | End: 2024-10-03

## 2024-10-03 RX ORDER — NAPROXEN 500 MG/1
500 TABLET ORAL 2 TIMES DAILY PRN
Qty: 12 TABLET | Refills: 0 | Status: SHIPPED | OUTPATIENT
Start: 2024-10-03

## 2024-10-03 RX ADMIN — KETOROLAC TROMETHAMINE 15 MG: 30 INJECTION, SOLUTION INTRAMUSCULAR; INTRAVENOUS at 06:10

## 2024-10-03 NOTE — ED PROVIDER NOTES
Encounter Date: 10/3/2024       History     Chief Complaint   Patient presents with    Foot Injury     Pt c/o left foot pain s/t slip off step earlier today.     Aleah Hagan is a 59 y.o. female with PMH of arthritis, fibromyalgia, GERD presenting to the ED for evaluation of left foot pain.  Patient reports left foot pain after accidental slip off of a step earlier today.  Left lateral dorsal foot pain that she describes as throbbing, exacerbated by weight-bearing and movement, currently rated 7/10 in severity.  She denies associated swelling or bruising.  Denies numbness or tingling to left foot.    The history is provided by the patient.     Review of patient's allergies indicates:   Allergen Reactions    Codeine Hives    Shrimp Hives     All seafood     Past Medical History:   Diagnosis Date    Abnormal Pap smear of cervix 2015    leep done- CIN1    Arthritis     ANH II (cervical intraepithelial neoplasia II)     Depression     Disc degeneration     ruptured    Fibromyalgia     GERD (gastroesophageal reflux disease)     Hx of psychiatric care     Psychiatric problem     Synovial cyst of lumbar spine     Therapy      Past Surgical History:   Procedure Laterality Date    APPENDECTOMY  1988    age 22    AUGMENTATION OF BREAST Bilateral     BREAST SURGERY      augmentation    CERVICAL BIOPSY  W/ LOOP ELECTRODE EXCISION  2016    ANH 1     SECTION, CLASSIC      x2    CHOLECYSTECTOMY  10/2013    COLONOSCOPY      COLONOSCOPY N/A 2019    Procedure: COLONOSCOPY;  Surgeon: Kristal Camarillo MD;  Location: [facility];  Service: Endoscopy;  Laterality: N/A;    COLONOSCOPY N/A 3/1/2024    Procedure: COLONOSCOPY;  Surgeon: Carmenza Calvert MD;  Location: [facility];  Service: Endoscopy;  Laterality: N/A;    ESOPHAGOGASTRODUODENOSCOPY N/A 3/1/2024    Procedure: EGD (ESOPHAGOGASTRODUODENOSCOPY);  Surgeon: Carmenza Calvert MD;  Location: [facility];  Service: Endoscopy;  Laterality: N/A;     gastric sleeve  02/25/2013    HYSTERECTOMY  09/11/2017    TLH- cervical dysp    NECK SURGERY  2015    cosmetic surgery    TONSILLECTOMY, ADENOIDECTOMY  age 9     Family History   Problem Relation Name Age of Onset    Lung cancer Mother      Lung cancer Father      Thyroid cancer Sister 2     Ovarian cancer Neg Hx      Breast cancer Neg Hx      Colon cancer Neg Hx       Social History     Tobacco Use    Smoking status: Former     Current packs/day: 0.50     Average packs/day: 0.5 packs/day for 40.1 years (20.0 ttl pk-yrs)     Types: Cigarettes     Start date: 9/6/1984    Smokeless tobacco: Never   Substance Use Topics    Alcohol use: Yes     Comment: twice a year    Drug use: No     Review of Systems   Constitutional:  Negative for activity change, chills and fever.   HENT:  Negative for congestion, ear discharge, ear pain, postnasal drip, sinus pressure, sinus pain and sore throat.    Respiratory:  Negative for cough, chest tightness and shortness of breath.    Cardiovascular:  Negative for chest pain.   Gastrointestinal:  Negative for abdominal distention, abdominal pain and nausea.   Genitourinary:  Negative for dysuria, frequency and urgency.   Musculoskeletal:  Positive for arthralgias (L foot). Negative for back pain.   Skin:  Negative for rash.   Neurological:  Negative for dizziness, weakness, light-headedness and numbness.   Hematological:  Does not bruise/bleed easily.       Physical Exam     Initial Vitals [10/03/24 1727]   BP Pulse Resp Temp SpO2   133/75 81 20 98.5 °F (36.9 °C) 100 %      MAP       --         Physical Exam    Nursing note and vitals reviewed.  Constitutional: She appears well-developed and well-nourished.   HENT:   Head: Normocephalic and atraumatic.   Right Ear: Tympanic membrane, external ear and ear canal normal. Tympanic membrane is not erythematous. No middle ear effusion.   Left Ear: Tympanic membrane, external ear and ear canal normal. Tympanic membrane is not erythematous.  No  middle ear effusion.   Nose: Nose normal. Mouth/Throat: Uvula is midline, oropharynx is clear and moist and mucous membranes are normal. Mucous membranes are not pale and not dry.   Eyes: Conjunctivae and EOM are normal. Pupils are equal, round, and reactive to light.   Neck: Neck supple.   Normal range of motion.  Cardiovascular:  Normal rate, regular rhythm, normal heart sounds and intact distal pulses.           Pulmonary/Chest: Effort normal and breath sounds normal. She has no decreased breath sounds. She has no wheezes. She has no rhonchi. She has no rales.   Abdominal: Abdomen is soft. Bowel sounds are normal. There is no abdominal tenderness.   Musculoskeletal:      Cervical back: Normal range of motion and neck supple.      Left foot: Decreased range of motion. Normal capillary refill. Tenderness (Dorsal lateral tenderness) present. Normal pulse.     Neurological: She is alert and oriented to person, place, and time. She has normal strength. She displays normal reflexes. No cranial nerve deficit or sensory deficit.   Skin: Skin is warm and dry. Capillary refill takes less than 2 seconds. No rash noted.   Psychiatric: She has a normal mood and affect. Her behavior is normal. Judgment and thought content normal.         ED Course   Procedures  Labs Reviewed - No data to display       Imaging Results              X-Ray Ankle Complete Left (Final result)  Result time 10/03/24 18:11:15      Final result by Bairon Ledesma MD (10/03/24 18:11:15)                   Impression:      No convincing radiographic evidence of acute displaced fracture or dislocation of the left ankle and foot.  Further evaluation and follow-up as warranted.      Electronically signed by: Bairon Ledesma MD  Date:    10/03/2024  Time:    18:11               Narrative:    EXAMINATION:  XR ANKLE COMPLETE 3 VIEW LEFT; XR FOOT COMPLETE 3 VIEW LEFT    CLINICAL HISTORY:  Pain in left ankle and joints of left foot; Pain in left  foot    TECHNIQUE:  AP, lateral and oblique views of the left ankle and left foot were performed.    COMPARISON:  None    FINDINGS:  There is no radiographic evidence of acute displaced fracture of the left ankle.  The ankle mortise appears maintained and symmetric.    No radiographic evidence of acute displaced fracture or dislocation of the left foot.  No abnormal widening of the Lisfranc interval appreciated.  Joint spaces are relatively well maintained.  There is mild pes planus.  There is calcaneal enthesopathic change at the distal Achilles insertion.  No retained radiopaque foreign body identified in the soft tissues.                                       X-Ray Foot Complete Left (Final result)  Result time 10/03/24 18:11:15      Final result by Bairon Ledesma MD (10/03/24 18:11:15)                   Impression:      No convincing radiographic evidence of acute displaced fracture or dislocation of the left ankle and foot.  Further evaluation and follow-up as warranted.      Electronically signed by: Bairon Ledesma MD  Date:    10/03/2024  Time:    18:11               Narrative:    EXAMINATION:  XR ANKLE COMPLETE 3 VIEW LEFT; XR FOOT COMPLETE 3 VIEW LEFT    CLINICAL HISTORY:  Pain in left ankle and joints of left foot; Pain in left foot    TECHNIQUE:  AP, lateral and oblique views of the left ankle and left foot were performed.    COMPARISON:  None    FINDINGS:  There is no radiographic evidence of acute displaced fracture of the left ankle.  The ankle mortise appears maintained and symmetric.    No radiographic evidence of acute displaced fracture or dislocation of the left foot.  No abnormal widening of the Lisfranc interval appreciated.  Joint spaces are relatively well maintained.  There is mild pes planus.  There is calcaneal enthesopathic change at the distal Achilles insertion.  No retained radiopaque foreign body identified in the soft tissues.                                       Medications    ketorolac injection 15 mg (15 mg Intramuscular Given 10/3/24 8068)     Medical Decision Making  Evaluation of a 59-year-old female presenting with left foot pain after accidental fall.  She presents with stable vital signs  Physical exam with + lateral, dorsal tenderness with no obvious swelling or bruising.  Good distal pulses and capillary refill.    Differential diagnosis includes contusion, fracture, sprain, strain    Problems Addressed:  Left foot pain: acute illness or injury    Amount and/or Complexity of Data Reviewed  Radiology: ordered. Decision-making details documented in ED Course.    Risk  Prescription drug management.  Risk Details: Stable for discharge home.  Patient presented with left foot pain after fall. Xray of the L foot shows not acute fracture or soft tissue swelling.  Ace wrap applied for comfort.  Rice instructions reviewed with patient.  Outpatient referral to orthopedist placed for follow-up. Patient/caregiver voices understanding and feels comfortable with discharge plan.      The patient acknowledges that close follow up with medical provider is required. Instructed to follow up with PCP within 2 days. Patient was given specific return precautions. The patient agrees to comply with all instruction and directions given in the ER.                                        Clinical Impression:  Final diagnoses:  [M79.672] Left foot pain (Primary)  [M25.572] Left ankle pain          ED Disposition Condition    Discharge Stable          ED Prescriptions       Medication Sig Dispense Start Date End Date Auth. Provider    naproxen (NAPROSYN) 500 MG tablet Take 1 tablet (500 mg total) by mouth 2 (two) times daily as needed (pain). 12 tablet 10/3/2024 -- Callie Nicholas NP          Follow-up Information       Follow up With Specialties Details Why Contact Info    Kesha Torres PA-C Orthopedic Surgery Schedule an appointment as soon as possible for a visit in 2 days  [address] [place]  [place] [address]  [phone]               Callie Nicholas, NP  10/03/24 1842

## 2024-10-04 ENCOUNTER — TELEPHONE (OUTPATIENT)
Dept: ORTHOPEDICS | Facility: CLINIC | Age: 59
End: 2024-10-04
Payer: MEDICAID

## 2024-10-11 ENCOUNTER — TELEPHONE (OUTPATIENT)
Dept: ORTHOPEDICS | Facility: CLINIC | Age: 59
End: 2024-10-11
Payer: MEDICAID

## 2024-12-10 ENCOUNTER — HOSPITAL ENCOUNTER (OUTPATIENT)
Dept: RADIOLOGY | Facility: HOSPITAL | Age: 59
Discharge: HOME | End: 2024-12-10
Attending: OBSTETRICS & GYNECOLOGY
Payer: MEDICAID

## 2024-12-10 ENCOUNTER — HOSPITAL ENCOUNTER (OUTPATIENT)
Dept: RADIOLOGY | Facility: HOSPITAL | Age: 59
Discharge: HOME | End: 2024-12-10
Attending: NURSE PRACTITIONER
Payer: MEDICAID

## 2024-12-10 VITALS — WEIGHT: 185 LBS | BODY MASS INDEX: 29.03 KG/M2 | HEIGHT: 67 IN

## 2024-12-10 DIAGNOSIS — Z78.0 ASYMPTOMATIC MENOPAUSAL STATE: ICD-10-CM

## 2024-12-10 DIAGNOSIS — Z12.31 BREAST CANCER SCREENING BY MAMMOGRAM: ICD-10-CM

## 2024-12-10 PROCEDURE — 77080 DXA BONE DENSITY AXIAL: CPT | Mod: TC

## 2024-12-10 PROCEDURE — 77080 DXA BONE DENSITY AXIAL: CPT | Mod: 26,,, | Performed by: RADIOLOGY

## 2024-12-10 PROCEDURE — 77067 SCR MAMMO BI INCL CAD: CPT | Mod: 26,,, | Performed by: RADIOLOGY

## 2024-12-10 PROCEDURE — 77063 BREAST TOMOSYNTHESIS BI: CPT | Mod: 26,,, | Performed by: RADIOLOGY

## 2024-12-10 PROCEDURE — 77067 SCR MAMMO BI INCL CAD: CPT | Mod: TC

## 2025-01-03 ENCOUNTER — TELEPHONE (OUTPATIENT)
Dept: OBSTETRICS AND GYNECOLOGY | Facility: CLINIC | Age: 60
End: 2025-01-03

## 2025-01-03 ENCOUNTER — OFFICE VISIT (OUTPATIENT)
Dept: OBSTETRICS AND GYNECOLOGY | Facility: CLINIC | Age: 60
End: 2025-01-03
Attending: OBSTETRICS & GYNECOLOGY
Payer: MEDICAID

## 2025-01-03 VITALS
SYSTOLIC BLOOD PRESSURE: 130 MMHG | WEIGHT: 186.19 LBS | HEIGHT: 67 IN | HEART RATE: 82 BPM | DIASTOLIC BLOOD PRESSURE: 86 MMHG | BODY MASS INDEX: 29.22 KG/M2

## 2025-01-03 DIAGNOSIS — N90.89 VULVAR LESION: ICD-10-CM

## 2025-01-03 DIAGNOSIS — Z12.72 VAGINAL PAP SMEAR FOLLOWING SURGERY: ICD-10-CM

## 2025-01-03 DIAGNOSIS — L30.8 OTHER ECZEMA: ICD-10-CM

## 2025-01-03 DIAGNOSIS — Z01.419 WELL WOMAN EXAM WITH ROUTINE GYNECOLOGICAL EXAM: Primary | ICD-10-CM

## 2025-01-03 PROCEDURE — 99999 PR PBB SHADOW E&M-EST. PATIENT-LVL III: CPT | Mod: PBBFAC,,, | Performed by: OBSTETRICS & GYNECOLOGY

## 2025-01-03 PROCEDURE — 99213 OFFICE O/P EST LOW 20 MIN: CPT | Mod: PBBFAC | Performed by: OBSTETRICS & GYNECOLOGY

## 2025-01-03 RX ORDER — LISDEXAMFETAMINE DIMESYLATE 50 MG/1
CAPSULE ORAL
COMMUNITY
Start: 2024-12-03

## 2025-01-03 RX ORDER — TRAZODONE HYDROCHLORIDE 50 MG/1
50 TABLET ORAL NIGHTLY
COMMUNITY

## 2025-01-03 RX ORDER — BACLOFEN 10 MG/1
1 TABLET ORAL 2 TIMES DAILY PRN
COMMUNITY
Start: 2024-09-26

## 2025-01-03 RX ORDER — MOMETASONE FUROATE 1 MG/G
CREAM TOPICAL DAILY
Qty: 45 G | Refills: 0 | Status: SHIPPED | OUTPATIENT
Start: 2025-01-03

## 2025-01-03 NOTE — PROGRESS NOTES
Subjective:    Patient ID: Aleah Hagan is a 59 y.o. y.o. female.     Chief Complaint: Annual Well Woman Exam     History of Present Illness:  Aleah presents today for Annual Well Woman exam. She describes her menses as  absent .  Pt h/o hysterectomy for dysplasia. She denies pelvic pain.  She denies breast tenderness, masses, nipple discharge. She reports GYN complaints of a vulvar bump that catches on the underwear. She denies difficulty with urination or bowel movements. She denies menopausal symptoms such as hotflashes, vaginal dryness, and night sweats. She denies bloating, early satiety, or weight changes. She is not currently sexually active.       Menstrual History:   Patient's last menstrual period was 2015 (exact date)..     OB History          3    Para   2    Term   2       0    AB   1    Living   2         SAB   1    IAB        Ectopic        Multiple        Live Births                     The following portions of the patient's history were reviewed and updated as appropriate: allergies, current medications, past family history, past medical history, past social history, past surgical history, and problem list.    ROS:   Review of Systems   Constitutional:  Positive for appetite change, chills, diaphoresis, fatigue and unexpected weight change. Negative for fever.   HENT:  Negative for congestion, hearing loss, postnasal drip, rhinorrhea, sinus pressure, sinus pain, sore throat and tinnitus.    Eyes:  Negative for pain, discharge and visual disturbance.   Respiratory:  Negative for apnea, cough, shortness of breath and wheezing.    Cardiovascular:  Negative for chest pain, palpitations and leg swelling.   Gastrointestinal:  Negative for abdominal pain, constipation, diarrhea, nausea and vomiting.   Endocrine: Positive for cold intolerance and heat intolerance. Negative for polydipsia, polyphagia and polyuria.   Genitourinary:  Negative for difficulty urinating,  dyspareunia, dysuria, enuresis, flank pain, frequency, genital sores, hematuria, menstrual problem, pelvic pain, urgency, vaginal bleeding, vaginal discharge and vaginal pain.   Musculoskeletal:  Positive for arthralgias, back pain, myalgias, neck pain and neck stiffness. Negative for joint swelling.   Skin:  Negative for color change, pallor and rash.   Allergic/Immunologic: Negative for environmental allergies, food allergies and immunocompromised state.   Neurological:  Negative for dizziness, weakness, light-headedness, numbness and headaches.   Hematological:  Negative for adenopathy. Does not bruise/bleed easily.   Psychiatric/Behavioral:  Positive for decreased concentration and sleep disturbance. Negative for agitation and confusion. The patient is not nervous/anxious.          Objective:    Vital Signs:  Vitals:    01/03/25 0907   BP: 130/86   Pulse: 82       Physical Exam:   Examination performed with Chaperone present  General:  alert, cooperative, no distress   Skin:  Skin color, texture, turgor normal. No rashes or lesions   HEENT:  extra ocular movement intact, sclera clear, anicteric   Neck: supple, trachea midline, no adenopathy or thyromegally   Respiratory:  Normal effort   Breasts:  bilateral implants were noted without obvious palpable abnormalities, no discharge, erythema, tenderness, or palpable masses; no axillary lymphadenopathy   Abdomen:  soft, nontender, no palpable masses   Pelvis: External genitalia: normal general appearance, 1.5 cm cystic lesion on right labia  Urinary system: urethral meatus normal, bladder nontender  Vaginal: normal mucosa without prolapse or lesions  Cervix: removed surgically  Uterus: removed surgically  Adnexa: non palpable   Extremities: Normal ROM; no edema, no cyanosis   Neurologial: Normal strength and tone. No focal numbness or weakness.   Psychiatric: normal mood, speech, dress, and thought processes         Assessment:       Healthy female exam.     1. Well  woman exam with routine gynecological exam    2. Vaginal Pap smear following surgery    3. Other eczema    4. Vulvar lesion          Plan:      Well woman exam with routine gynecological exam    Vaginal Pap smear following surgery  -     Liquid-Based Pap Smear, Screening    Other eczema  -     mometasone 0.1% (ELOCON) 0.1 % cream; Apply topically once daily.  Dispense: 45 g; Refill: 0    Vulvar lesion          COUNSELING:  Aleah was counseled on [facility] Pap guidelines and recommendations for yearly pelvic exams in addition to recommendations for monthly self breast exams; to see her PCP for other health maintenance.

## 2025-01-24 ENCOUNTER — PROCEDURE VISIT (OUTPATIENT)
Dept: OBSTETRICS AND GYNECOLOGY | Facility: CLINIC | Age: 60
End: 2025-01-24
Payer: MEDICAID

## 2025-01-24 VITALS
WEIGHT: 181.38 LBS | HEART RATE: 76 BPM | HEIGHT: 67 IN | SYSTOLIC BLOOD PRESSURE: 120 MMHG | DIASTOLIC BLOOD PRESSURE: 76 MMHG | BODY MASS INDEX: 28.47 KG/M2

## 2025-01-24 DIAGNOSIS — N90.7 VULVAR CYST: Primary | ICD-10-CM

## 2025-01-24 NOTE — PROCEDURES
"INCISION AND DRAINAGE    Date/Time: 1/24/2025 10:10 AM    Performed by: Cisco Deleon MD  Authorized by: Cisco Deleon MD    Time out: Immediately prior to procedure a "time out" was called to verify the correct patient, procedure, equipment, support staff and site/side marked as required.    Consent Done?:  Yes (Verbal) and Yes (Written)    Type:  Cyst  Body area:  Anogenital  Location details:  Vulva  Anesthesia:  Local infiltration  Local anesthetic: Lidocaine 2% with epinephrine  Scalpel size:  11  Incision type:  Single straight  Incision depth: dermal    Complexity:  Simple  Drainage amount:  Moderate  Wound treatment:  Incision and drainage  Patient tolerance:  Patient tolerated the procedure well with no immediate complications    "

## 2025-06-03 NOTE — PLAN OF CARE
Problem: Patient Care Overview (Adult)  Goal: Interdisciplinary Rounds/Family Conf    TREATMENT TEAM UPDATE    Chief Complaint:  Patient came to Dr. Gonsales in clinic. Stressed over relationship,  since 20yo, found out  was cheating on her for most of that time. Seperated a few years ago, states he is manipulative with [employer]anda[employer], money and She got back with him. She wants divorce. Has had thought of running car off the road.     Current:  Patient presents with blunted affect, depressed mood, slowed movements, response. Patient mumbles. Patient states she did not sleep well. Multiple somatic complaints. Has a special bed at home to take pressure points off. Will get patient air mattress  Daughter came to get her stuff. Has a rental car that's due today. States she is not sure who can help her as her son is going to [place], daughter has  baby. Patient will be allowed to use the telephone to coordinate.  Medication: Cymbalta     Plan:  D/C to home  ROBSON Gonsales              hide

## 2025-08-07 ENCOUNTER — HOSPITAL ENCOUNTER (EMERGENCY)
Facility: HOSPITAL | Age: 60
Discharge: HOME | End: 2025-08-07
Attending: SURGERY
Payer: MEDICAID

## 2025-08-07 VITALS
TEMPERATURE: 98 F | WEIGHT: 160.5 LBS | BODY MASS INDEX: 25.19 KG/M2 | OXYGEN SATURATION: 97 % | HEART RATE: 67 BPM | HEIGHT: 67 IN | SYSTOLIC BLOOD PRESSURE: 123 MMHG | DIASTOLIC BLOOD PRESSURE: 80 MMHG | RESPIRATION RATE: 16 BRPM

## 2025-08-07 DIAGNOSIS — T78.40XA ALLERGIC REACTION, INITIAL ENCOUNTER: Primary | ICD-10-CM

## 2025-08-07 PROCEDURE — 99284 EMERGENCY DEPT VISIT MOD MDM: CPT | Mod: 25

## 2025-08-07 PROCEDURE — 96372 THER/PROPH/DIAG INJ SC/IM: CPT | Performed by: SURGERY

## 2025-08-07 PROCEDURE — 63600175 PHARM REV CODE 636 W HCPCS: Performed by: SURGERY

## 2025-08-07 RX ORDER — EPINEPHRINE 0.3 MG/.3ML
1 INJECTION SUBCUTANEOUS ONCE
Qty: 0.3 ML | Refills: 0 | Status: SHIPPED | OUTPATIENT
Start: 2025-08-07 | End: 2025-08-07

## 2025-08-07 RX ORDER — DIPHENHYDRAMINE HCL 50 MG
50 CAPSULE ORAL EVERY 6 HOURS PRN
Qty: 20 CAPSULE | Refills: 0 | Status: SHIPPED | OUTPATIENT
Start: 2025-08-07

## 2025-08-07 RX ORDER — DIPHENHYDRAMINE HYDROCHLORIDE 50 MG/ML
50 INJECTION, SOLUTION INTRAMUSCULAR; INTRAVENOUS
Status: COMPLETED | OUTPATIENT
Start: 2025-08-07 | End: 2025-08-07

## 2025-08-07 RX ORDER — METHYLPREDNISOLONE SOD SUCC 125 MG
125 VIAL (EA) INJECTION
Status: COMPLETED | OUTPATIENT
Start: 2025-08-07 | End: 2025-08-07

## 2025-08-07 RX ORDER — PREDNISONE 20 MG/1
40 TABLET ORAL DAILY
Qty: 10 TABLET | Refills: 0 | Status: SHIPPED | OUTPATIENT
Start: 2025-08-07 | End: 2025-08-12

## 2025-08-07 RX ORDER — DIPHENHYDRAMINE HCL 50 MG
50 CAPSULE ORAL EVERY 6 HOURS PRN
Qty: 20 CAPSULE | Refills: 0 | Status: SHIPPED | OUTPATIENT
Start: 2025-08-07 | End: 2025-08-07

## 2025-08-07 RX ORDER — PREDNISONE 20 MG/1
40 TABLET ORAL DAILY
Qty: 10 TABLET | Refills: 0 | Status: SHIPPED | OUTPATIENT
Start: 2025-08-07 | End: 2025-08-07

## 2025-08-07 RX ADMIN — DIPHENHYDRAMINE HYDROCHLORIDE 50 MG: 50 INJECTION INTRAMUSCULAR; INTRAVENOUS at 09:08

## 2025-08-07 RX ADMIN — METHYLPREDNISOLONE SODIUM SUCCINATE 125 MG: 125 INJECTION, POWDER, FOR SOLUTION INTRAMUSCULAR; INTRAVENOUS at 09:08

## (undated) DEVICE — TROCAR ENDOPATH XCEL 11MM 10CM

## (undated) DEVICE — SOL CLEARIFY VISUALIZATION LAP

## (undated) DEVICE — IRRIGATOR ENDOSCOPY DISP.

## (undated) DEVICE — TROCAR ENDOPATH XCEL 5X100MM

## (undated) DEVICE — ENDOSTITCH INSTRUMENT

## (undated) DEVICE — ADHESIVE DERMABOND ADVANCED

## (undated) DEVICE — NDL INSUF ULTRA VERESS 120MM

## (undated) DEVICE — GLOVE BIOGEL SKINSENSE PI 8.5

## (undated) DEVICE — SUTURE VICRYL PLUS 3-0 PS1 27

## (undated) DEVICE — KITTNER ENDOSCOPIC SINGLE TIP

## (undated) DEVICE — ELECTRODE REM PLYHSV RETURN 9

## (undated) DEVICE — CHLORAPREP W TINT 26ML APPL

## (undated) DEVICE — SOL NS 1000CC

## (undated) DEVICE — EVACUATOR KIT SMOKE PLUME AWAY

## (undated) DEVICE — PAD SANITARY OB STERILE

## (undated) DEVICE — KIT PREP E-Z WET W/2-6

## (undated) DEVICE — PACK LAPAROSCOPY

## (undated) DEVICE — FORCEP DISSECTING LYONS PKS

## (undated) DEVICE — DISSECTOR SONICISION CRV 39CM

## (undated) DEVICE — ENDOSTITCH POLYSORB 0 ES-9

## (undated) DEVICE — SCISSOR 5MMX35CM DIRECT DRIVE

## (undated) DEVICE — GLOVE BIOGEL SKINSENSE PI 7.5

## (undated) DEVICE — GLOVE BIOGEL SKINSENSE PI 6.5

## (undated) DEVICE — GOWN SMART IMP BREATHABLE XXLG

## (undated) DEVICE — SEE MEDLINE ITEM 157117